# Patient Record
Sex: FEMALE | Race: WHITE | NOT HISPANIC OR LATINO | Employment: FULL TIME | URBAN - METROPOLITAN AREA
[De-identification: names, ages, dates, MRNs, and addresses within clinical notes are randomized per-mention and may not be internally consistent; named-entity substitution may affect disease eponyms.]

---

## 2017-01-05 ENCOUNTER — GENERIC CONVERSION - ENCOUNTER (OUTPATIENT)
Dept: OTHER | Facility: OTHER | Age: 27
End: 2017-01-05

## 2017-02-07 ENCOUNTER — HOSPITAL ENCOUNTER (EMERGENCY)
Facility: HOSPITAL | Age: 27
Discharge: HOME | End: 2017-02-07
Attending: EMERGENCY MEDICINE
Payer: COMMERCIAL

## 2017-02-07 ENCOUNTER — APPOINTMENT (EMERGENCY)
Dept: CT IMAGING | Facility: HOSPITAL | Age: 27
End: 2017-02-07
Payer: COMMERCIAL

## 2017-02-07 VITALS
HEART RATE: 71 BPM | DIASTOLIC BLOOD PRESSURE: 84 MMHG | TEMPERATURE: 98.3 F | RESPIRATION RATE: 18 BRPM | OXYGEN SATURATION: 97 % | SYSTOLIC BLOOD PRESSURE: 135 MMHG

## 2017-02-07 DIAGNOSIS — R10.9 ACUTE LEFT FLANK PAIN: Primary | ICD-10-CM

## 2017-02-07 LAB
ANION GAP SERPL CALCULATED.3IONS-SCNC: 9 MMOL/L (ref 4–13)
BASOPHILS # BLD AUTO: 0.02 THOUSANDS/ΜL (ref 0–0.1)
BASOPHILS NFR BLD AUTO: 0 % (ref 0–1)
BILIRUB UR QL STRIP: NEGATIVE
BUN SERPL-MCNC: 10 MG/DL (ref 5–25)
CALCIUM SERPL-MCNC: 8.7 MG/DL (ref 8.3–10.1)
CHLORIDE SERPL-SCNC: 104 MMOL/L (ref 100–108)
CLARITY UR: CLEAR
CO2 SERPL-SCNC: 27 MMOL/L (ref 21–32)
COLOR UR: YELLOW
CREAT SERPL-MCNC: 0.77 MG/DL (ref 0.6–1.3)
EOSINOPHIL # BLD AUTO: 0.14 THOUSAND/ΜL (ref 0–0.61)
EOSINOPHIL NFR BLD AUTO: 2 % (ref 0–6)
ERYTHROCYTE [DISTWIDTH] IN BLOOD BY AUTOMATED COUNT: 12.5 % (ref 11.6–15.1)
GFR SERPL CREATININE-BSD FRML MDRD: >60 ML/MIN/1.73SQ M
GLUCOSE SERPL-MCNC: 98 MG/DL (ref 65–140)
GLUCOSE UR STRIP-MCNC: NEGATIVE MG/DL
HCG UR QL: NORMAL
HCT VFR BLD AUTO: 45.4 % (ref 34.8–46.1)
HGB BLD-MCNC: 15.4 G/DL (ref 11.5–15.4)
HGB UR QL STRIP.AUTO: NEGATIVE
KETONES UR STRIP-MCNC: NEGATIVE MG/DL
LEUKOCYTE ESTERASE UR QL STRIP: NEGATIVE
LYMPHOCYTES # BLD AUTO: 2.01 THOUSANDS/ΜL (ref 0.6–4.47)
LYMPHOCYTES NFR BLD AUTO: 33 % (ref 14–44)
MCH RBC QN AUTO: 31.5 PG (ref 26.8–34.3)
MCHC RBC AUTO-ENTMCNC: 33.9 G/DL (ref 31.4–37.4)
MCV RBC AUTO: 93 FL (ref 82–98)
MONOCYTES # BLD AUTO: 0.67 THOUSAND/ΜL (ref 0.17–1.22)
MONOCYTES NFR BLD AUTO: 11 % (ref 4–12)
NEUTROPHILS # BLD AUTO: 3.2 THOUSANDS/ΜL (ref 1.85–7.62)
NEUTS SEG NFR BLD AUTO: 54 % (ref 43–75)
NITRITE UR QL STRIP: NEGATIVE
PH UR STRIP.AUTO: 6.5 [PH] (ref 4.5–8)
PLATELET # BLD AUTO: 155 THOUSANDS/UL (ref 149–390)
PMV BLD AUTO: 10.9 FL (ref 8.9–12.7)
POTASSIUM SERPL-SCNC: 3.5 MMOL/L (ref 3.5–5.3)
PROT UR STRIP-MCNC: NEGATIVE MG/DL
RBC # BLD AUTO: 4.89 MILLION/UL (ref 3.81–5.12)
SODIUM SERPL-SCNC: 140 MMOL/L (ref 136–145)
SP GR UR STRIP.AUTO: 1.02 (ref 1–1.03)
UROBILINOGEN UR QL STRIP.AUTO: 0.2 E.U./DL
WBC # BLD AUTO: 6.04 THOUSAND/UL (ref 4.31–10.16)

## 2017-02-07 PROCEDURE — 80048 BASIC METABOLIC PNL TOTAL CA: CPT | Performed by: EMERGENCY MEDICINE

## 2017-02-07 PROCEDURE — 81003 URINALYSIS AUTO W/O SCOPE: CPT | Performed by: EMERGENCY MEDICINE

## 2017-02-07 PROCEDURE — 85025 COMPLETE CBC W/AUTO DIFF WBC: CPT | Performed by: EMERGENCY MEDICINE

## 2017-02-07 PROCEDURE — 99284 EMERGENCY DEPT VISIT MOD MDM: CPT

## 2017-02-07 PROCEDURE — 81025 URINE PREGNANCY TEST: CPT | Performed by: EMERGENCY MEDICINE

## 2017-02-07 PROCEDURE — 36415 COLL VENOUS BLD VENIPUNCTURE: CPT | Performed by: EMERGENCY MEDICINE

## 2017-02-07 PROCEDURE — 96374 THER/PROPH/DIAG INJ IV PUSH: CPT

## 2017-02-07 PROCEDURE — 74176 CT ABD & PELVIS W/O CONTRAST: CPT

## 2017-02-07 PROCEDURE — 96375 TX/PRO/DX INJ NEW DRUG ADDON: CPT

## 2017-02-07 RX ORDER — HYDROCODONE BITARTRATE AND ACETAMINOPHEN 5; 325 MG/1; MG/1
1 TABLET ORAL EVERY 6 HOURS PRN
Qty: 15 TABLET | Refills: 0 | Status: SHIPPED | OUTPATIENT
Start: 2017-02-07 | End: 2017-02-17

## 2017-02-07 RX ORDER — MORPHINE SULFATE 4 MG/ML
4 INJECTION, SOLUTION INTRAMUSCULAR; INTRAVENOUS ONCE
Status: COMPLETED | OUTPATIENT
Start: 2017-02-07 | End: 2017-02-07

## 2017-02-07 RX ORDER — KETOROLAC TROMETHAMINE 30 MG/ML
15 INJECTION, SOLUTION INTRAMUSCULAR; INTRAVENOUS ONCE
Status: COMPLETED | OUTPATIENT
Start: 2017-02-07 | End: 2017-02-07

## 2017-02-07 RX ADMIN — MORPHINE SULFATE 4 MG: 4 INJECTION, SOLUTION INTRAMUSCULAR; INTRAVENOUS at 19:19

## 2017-02-07 RX ADMIN — KETOROLAC TROMETHAMINE 15 MG: 30 INJECTION, SOLUTION INTRAMUSCULAR at 19:17

## 2017-03-20 ENCOUNTER — APPOINTMENT (EMERGENCY)
Dept: RADIOLOGY | Facility: HOSPITAL | Age: 27
End: 2017-03-20
Payer: COMMERCIAL

## 2017-03-20 ENCOUNTER — HOSPITAL ENCOUNTER (EMERGENCY)
Facility: HOSPITAL | Age: 27
Discharge: HOME | End: 2017-03-20
Attending: EMERGENCY MEDICINE | Admitting: EMERGENCY MEDICINE
Payer: COMMERCIAL

## 2017-03-20 VITALS
RESPIRATION RATE: 18 BRPM | DIASTOLIC BLOOD PRESSURE: 101 MMHG | SYSTOLIC BLOOD PRESSURE: 192 MMHG | HEART RATE: 78 BPM | OXYGEN SATURATION: 100 % | TEMPERATURE: 98.4 F

## 2017-03-20 DIAGNOSIS — S33.5XXA LUMBAR SPRAIN, INITIAL ENCOUNTER: Primary | ICD-10-CM

## 2017-03-20 PROCEDURE — 96372 THER/PROPH/DIAG INJ SC/IM: CPT

## 2017-03-20 PROCEDURE — 72100 X-RAY EXAM L-S SPINE 2/3 VWS: CPT

## 2017-03-20 PROCEDURE — 99283 EMERGENCY DEPT VISIT LOW MDM: CPT

## 2017-03-20 RX ORDER — CYCLOBENZAPRINE HCL 10 MG
10 TABLET ORAL ONCE
Status: COMPLETED | OUTPATIENT
Start: 2017-03-20 | End: 2017-03-20

## 2017-03-20 RX ORDER — KETOROLAC TROMETHAMINE 30 MG/ML
30 INJECTION, SOLUTION INTRAMUSCULAR; INTRAVENOUS ONCE
Status: COMPLETED | OUTPATIENT
Start: 2017-03-20 | End: 2017-03-20

## 2017-03-20 RX ORDER — CYCLOBENZAPRINE HCL 5 MG
5 TABLET ORAL 3 TIMES DAILY PRN
Qty: 12 TABLET | Refills: 0 | Status: SHIPPED | OUTPATIENT
Start: 2017-03-20 | End: 2020-09-14 | Stop reason: ALTCHOICE

## 2017-03-20 RX ORDER — METHYLPREDNISOLONE 4 MG/1
TABLET ORAL
Qty: 21 TABLET | Refills: 0 | Status: ON HOLD | OUTPATIENT
Start: 2017-03-20 | End: 2019-03-12

## 2017-03-20 RX ADMIN — KETOROLAC TROMETHAMINE 30 MG: 30 INJECTION, SOLUTION INTRAMUSCULAR at 22:17

## 2017-03-20 RX ADMIN — CYCLOBENZAPRINE HYDROCHLORIDE 10 MG: 10 TABLET, FILM COATED ORAL at 22:17

## 2017-03-24 ENCOUNTER — GENERIC CONVERSION - ENCOUNTER (OUTPATIENT)
Dept: OTHER | Facility: OTHER | Age: 27
End: 2017-03-24

## 2017-06-20 ENCOUNTER — GENERIC CONVERSION - ENCOUNTER (OUTPATIENT)
Dept: OTHER | Facility: OTHER | Age: 27
End: 2017-06-20

## 2017-11-02 ENCOUNTER — APPOINTMENT (OUTPATIENT)
Dept: URGENT CARE | Age: 27
End: 2017-11-02
Payer: OTHER MISCELLANEOUS

## 2017-11-02 PROCEDURE — 99283 EMERGENCY DEPT VISIT LOW MDM: CPT | Performed by: PREVENTIVE MEDICINE

## 2017-11-02 PROCEDURE — G0382 LEV 3 HOSP TYPE B ED VISIT: HCPCS | Performed by: PREVENTIVE MEDICINE

## 2017-11-06 ENCOUNTER — APPOINTMENT (OUTPATIENT)
Dept: URGENT CARE | Age: 27
End: 2017-11-06
Payer: OTHER MISCELLANEOUS

## 2017-11-06 PROCEDURE — 99213 OFFICE O/P EST LOW 20 MIN: CPT | Performed by: PREVENTIVE MEDICINE

## 2018-01-10 NOTE — PROGRESS NOTES
Chief Complaint  pt is here for depo injection      Active Problems    1  Abdominal pain (789 00) (R10 9)   2  Acne (706 1) (L70 9)   3  Ankle Sprain (845 00)   4  Calcium oxalate crystals in urine (791 9) (R82 99)   5  Contact dermatitis due to plant (692 6) (L25 5)   6  Contraception (V25 9) (Z30 9)   7  Dressing change or removal, surgical wound (V58 31) (Z48 01)   8  Encounter for initial prescription of injectable contraceptive (V25 02) (Z30 013)   9  Encounter for routine gynecological examination with Papanicolaou smear of cervix   (V72 31,V76 2) (Z01 419)   10  Folliculitis (257 4) (O47 8)   11  History of kidney stones (V13 01) (Z87 442)   12  Irritable bowel syndrome (564 1) (K58 9)   13  Kidney stone (592 0) (N20 0)   14  Lymphadenitis (289 3) (I88 9)   15  Nicotine dependence (305 1) (F17 200)   16  Obesity (278 00) (E66 9)   17  Screening for STD (sexually transmitted disease) (V74 5) (Z11 3)    Current Meds   1  Theraflu Cold & Cough 10-20-20 MG Oral Packet; Therapy: [id number] to Recorded    Allergies    1  No Known Drug Allergies    2  No Known Environmental Allergies   3  No Known Food Allergies   4  Tape    Vitals  Signs    Systolic: 947, RUE, Sitting  Diastolic: 74, RUE, Sitting  Heart Rate: 86  Height: 5 ft 5 in  Weight: 219 lb   BMI Calculated: 36 44  BSA Calculated: 2 06    Plan  Encounter for Depo-Provera contraception    · MedroxyPROGESTERone Acetate 150 MG/ML Intramuscular Suspension;  INJECT 1  ML Intramuscular every 11 weeks; Recurring Schedule:[id number] to (Exp:[id number])   · MedroxyPROGESTERone Acetate 150 MG/ML Intramuscular Suspension    Future Appointments    Date/Time Provider Specialty Site   10/20/2016 03:00 PM [address] [place] [address], Injection Schedule  [facility]     Signatures   Electronically signed by : LUIS Stein;  Aug  4 2016  4:48PM EST                       (Author)    Electronically signed by : Giana Orr MD; Aug  6 2016  9:25AM EST (Administrative)

## 2018-01-16 NOTE — PROGRESS NOTES
Chief Complaint  pt here for depo      Active Problems    1  Abdominal pain (789 00) (R10 9)   2  Acne (706 1) (L70 9)   3  Ankle Sprain (845 00)   4  Calcium oxalate crystals in urine (791 9) (R82 99)   5  Contact dermatitis due to plant (692 6) (L25 5)   6  Dressing change or removal, surgical wound (V58 31) (Z48 01)   7  Encounter for initial prescription of injectable contraceptive (V25 02) (Z30 013)   8  Encounter for routine gynecological examination with Papanicolaou smear of cervix   (V72 31,V76 2) (Z01 419)   9  Folliculitis (643 0) (O59 8)   10  History of kidney stones (V13 01) (Z87 442)   11  Irritable bowel syndrome (564 1) (K58 9)   12  Kidney stone (592 0) (N20 0)   13  Lymphadenitis (289 3) (I88 9)   14  Nicotine dependence (305 1) (F17 200)   15  Obesity (278 00) (E66 9)   16  Screening for STD (sexually transmitted disease) (V74 5) (Z11 3)    Current Meds   1  Theraflu Cold & Cough 10-20-20 MG Oral Packet; Therapy: [id number] to Recorded    Allergies    1  No Known Drug Allergies    2  No Known Environmental Allergies   3  No Known Food Allergies   4   Tape    Vitals  Signs [Data Includes: Current Encounter]    Systolic: 329  Diastolic: 86  BP Cuff Size: Large  Height: 5 ft   Weight: 220 lb 6 4 oz  BMI Calculated: 43 04  BSA Calculated: 1 95  Pain Scale: 0   Height: 5 ft     Results/Data  Screen Ebola Flowsheet [id number] 03:19PM      Test Name Result Flag Reference   Exposure to Ebola Virus - Within 21 Days No         Future Appointments    Date/Time Provider Specialty Site   08/04/2016 03:00 PM [address] [facility], Injection Schedule  [facility]     Signatures   Electronically signed by : Jumana Mccarthy MD; May 19 2016  4:48PM EST                       (Administrative)

## 2018-01-22 VITALS
SYSTOLIC BLOOD PRESSURE: 134 MMHG | BODY MASS INDEX: 34.53 KG/M2 | HEART RATE: 84 BPM | WEIGHT: 207.5 LBS | DIASTOLIC BLOOD PRESSURE: 84 MMHG

## 2018-01-22 VITALS
WEIGHT: 217 LBS | DIASTOLIC BLOOD PRESSURE: 76 MMHG | BODY MASS INDEX: 36.11 KG/M2 | SYSTOLIC BLOOD PRESSURE: 121 MMHG | HEART RATE: 87 BPM

## 2018-01-22 VITALS
WEIGHT: 222 LBS | HEART RATE: 73 BPM | SYSTOLIC BLOOD PRESSURE: 129 MMHG | BODY MASS INDEX: 36.94 KG/M2 | DIASTOLIC BLOOD PRESSURE: 93 MMHG

## 2018-03-30 ENCOUNTER — APPOINTMENT (OUTPATIENT)
Dept: URGENT CARE | Age: 28
End: 2018-03-30
Payer: OTHER MISCELLANEOUS

## 2018-03-30 PROCEDURE — 99284 EMERGENCY DEPT VISIT MOD MDM: CPT | Performed by: PREVENTIVE MEDICINE

## 2018-03-30 PROCEDURE — G0383 LEV 4 HOSP TYPE B ED VISIT: HCPCS | Performed by: PREVENTIVE MEDICINE

## 2018-04-05 ENCOUNTER — APPOINTMENT (OUTPATIENT)
Dept: URGENT CARE | Age: 28
End: 2018-04-05
Payer: OTHER MISCELLANEOUS

## 2018-04-05 PROCEDURE — 99213 OFFICE O/P EST LOW 20 MIN: CPT | Performed by: PREVENTIVE MEDICINE

## 2018-04-12 ENCOUNTER — APPOINTMENT (OUTPATIENT)
Dept: URGENT CARE | Age: 28
End: 2018-04-12
Payer: OTHER MISCELLANEOUS

## 2018-04-12 PROCEDURE — 99213 OFFICE O/P EST LOW 20 MIN: CPT | Performed by: PREVENTIVE MEDICINE

## 2018-12-27 ENCOUNTER — OFFICE VISIT (OUTPATIENT)
Dept: PLASTIC SURGERY | Facility: MEDICAL CENTER | Age: 28
End: 2018-12-27
Payer: COMMERCIAL

## 2018-12-27 VITALS — BODY MASS INDEX: 33.66 KG/M2 | HEIGHT: 65 IN | WEIGHT: 202 LBS

## 2018-12-27 DIAGNOSIS — G89.29 CHRONIC BACK PAIN, UNSPECIFIED BACK LOCATION, UNSPECIFIED BACK PAIN LATERALITY: ICD-10-CM

## 2018-12-27 DIAGNOSIS — M54.9 CHRONIC BACK PAIN, UNSPECIFIED BACK LOCATION, UNSPECIFIED BACK PAIN LATERALITY: ICD-10-CM

## 2018-12-27 DIAGNOSIS — N62 MACROMASTIA: Primary | ICD-10-CM

## 2018-12-27 PROCEDURE — 99203 OFFICE O/P NEW LOW 30 MIN: CPT | Performed by: SURGERY

## 2018-12-27 NOTE — PROGRESS NOTES
Assessment/Plan:    Macromastia  44-year-old female with chronic upper and lower back pain for the last 2 years it started with intense muscle spasms who presents in consultation for bilateral breast reduction  She attributes the back pain to her large breasts  She states that she is had large breasts since the 7th grade  They are currently a 45 G  She would like to be a full D  she reports menarche at the age of 5 and breast development shortly thereafter  She has no family history or personal history of breast cancer  Her grandmother had cervical cancer  She has not yet had a mammogram   She has 1 child and she did not breast-feed the child  She feels as though she is fairly symmetric  She works in a warehouse  She has symptomatic macromastia  I have recommended bilateral breast reduction to alleviate her symptoms  I have discussed with her the risks, benefits and alternatives of the procedure including but not limited to risks of bleeding, infection, characteristic inverted T pattern scar, seroma and possibility of loss of nipple sensation  She understands these risks and wishes to proceed  I will write a letter of medical necessity and pending prior authorization we will schedule her for surgery  Diagnoses and all orders for this visit:    Macromastia    Chronic back pain, unspecified back location, unspecified back pain laterality          Subjective:      Patient ID: Luke Delacruz is a 29 y o  female  44-year-old female with chronic upper and lower back pain for the last 2 years it started with intense muscle spasms who presents in consultation for bilateral breast reduction  She attributes the back pain to her large breasts  She states that she is had large breasts since the 7th grade  They are currently a 45 G  She would like to be a full D  she reports menarche at the age of 5 and breast development shortly thereafter    She has no family history or personal history of breast cancer  Her grandmother had cervical cancer  She has not yet had a mammogram   She has 1 child and she did not breast-feed the child  She feels as though she is fairly symmetric  She works in a warehouse  The following portions of the patient's history were reviewed and updated as appropriate: allergies, current medications, past family history, past medical history, past social history, past surgical history and problem list     Review of Systems   Constitutional: Negative  HENT: Negative  Eyes: Negative  Respiratory: Negative  Cardiovascular: Negative  Gastrointestinal: Negative  Endocrine: Negative  Genitourinary: Negative  Musculoskeletal:        As noted in HPI   Skin: Negative  Allergic/Immunologic: Negative  Neurological: Negative  Hematological: Negative  Psychiatric/Behavioral: Negative  Objective:      Ht 5' 5" (1 651 m)   Wt 91 6 kg (202 lb)   BMI 33 61 kg/m²          Physical Exam   Constitutional: She is oriented to person, place, and time  She appears well-developed and well-nourished  HENT:   Head: Normocephalic and atraumatic  Eyes: Pupils are equal, round, and reactive to light  Conjunctivae are normal    Neck: Normal range of motion  Cardiovascular: Normal rate and regular rhythm  Pulmonary/Chest: Effort normal and breath sounds normal        Left breast slightly larger than right  Abdominal: Soft  Bowel sounds are normal    Musculoskeletal: Normal range of motion  Neurological: She is alert and oriented to person, place, and time  Skin: Skin is warm and dry  Psychiatric: She has a normal mood and affect   Her behavior is normal

## 2018-12-27 NOTE — ASSESSMENT & PLAN NOTE
30-year-old female with chronic upper and lower back pain for the last 2 years it started with intense muscle spasms who presents in consultation for bilateral breast reduction  She attributes the back pain to her large breasts  She states that she is had large breasts since the 7th grade  They are currently a 45 G  She would like to be a full D  she reports menarche at the age of 5 and breast development shortly thereafter  She has no family history or personal history of breast cancer  Her grandmother had cervical cancer  She has not yet had a mammogram   She has 1 child and she did not breast-feed the child  She feels as though she is fairly symmetric  She works in a warehouse  She has symptomatic macromastia  I have recommended bilateral breast reduction to alleviate her symptoms  I have discussed with her the risks, benefits and alternatives of the procedure including but not limited to risks of bleeding, infection, characteristic inverted T pattern scar, seroma and possibility of loss of nipple sensation  She understands these risks and wishes to proceed  I will write a letter of medical necessity and pending prior authorization we will schedule her for surgery

## 2019-01-21 PROBLEM — N62 LARGE BREASTS: Status: ACTIVE | Noted: 2019-01-21

## 2019-02-26 LAB
BASOPHILS # BLD AUTO: 32 CELLS/UL (ref 0–200)
BASOPHILS NFR BLD AUTO: 0.6 %
BUN SERPL-MCNC: 14 MG/DL (ref 7–25)
BUN/CREAT SERPL: NORMAL (CALC) (ref 6–22)
CALCIUM SERPL-MCNC: 9.2 MG/DL (ref 8.6–10.2)
CHLORIDE SERPL-SCNC: 104 MMOL/L (ref 98–110)
CO2 SERPL-SCNC: 27 MMOL/L (ref 20–32)
COTININE SERPL-MCNC: 94 NG/ML
CREAT SERPL-MCNC: 0.69 MG/DL (ref 0.5–1.1)
EOSINOPHIL # BLD AUTO: 180 CELLS/UL (ref 15–500)
EOSINOPHIL NFR BLD AUTO: 3.4 %
ERYTHROCYTE [DISTWIDTH] IN BLOOD BY AUTOMATED COUNT: 12.2 % (ref 11–15)
GLUCOSE SERPL-MCNC: 88 MG/DL (ref 65–99)
HCT VFR BLD AUTO: 43.5 % (ref 35–45)
HGB BLD-MCNC: 14.8 G/DL (ref 11.7–15.5)
LYMPHOCYTES # BLD AUTO: 1442 CELLS/UL (ref 850–3900)
LYMPHOCYTES NFR BLD AUTO: 27.2 %
MCH RBC QN AUTO: 32.2 PG (ref 27–33)
MCHC RBC AUTO-ENTMCNC: 34 G/DL (ref 32–36)
MCV RBC AUTO: 94.8 FL (ref 80–100)
MONOCYTES # BLD AUTO: 498 CELLS/UL (ref 200–950)
MONOCYTES NFR BLD AUTO: 9.4 %
NEUTROPHILS # BLD AUTO: 3148 CELLS/UL (ref 1500–7800)
NEUTROPHILS NFR BLD AUTO: 59.4 %
NICOTINE SERPL-MCNC: <2 NG/ML
PLATELET # BLD AUTO: 175 THOUSAND/UL (ref 140–400)
PMV BLD REES-ECKER: 11.7 FL (ref 7.5–12.5)
POTASSIUM SERPL-SCNC: 4 MMOL/L (ref 3.5–5.3)
RBC # BLD AUTO: 4.59 MILLION/UL (ref 3.8–5.1)
SL AMB EGFR AFRICAN AMERICAN: 137 ML/MIN/1.73M2
SL AMB EGFR NON AFRICAN AMERICAN: 118 ML/MIN/1.73M2
SODIUM SERPL-SCNC: 138 MMOL/L (ref 135–146)
WBC # BLD AUTO: 5.3 THOUSAND/UL (ref 3.8–10.8)

## 2019-03-04 NOTE — PRE-PROCEDURE INSTRUCTIONS
Pre-Surgery Instructions:   Medication Instructions    cyclobenzaprine (FLEXERIL) 5 mg tablet Instructed patient per Anesthesia Guidelines   methylprednisolone (MEDROL) 4 mg tablet Instructed patient per Anesthesia Guidelines      Pre op and showering instructions reviewed-Patient getting Hibiclens

## 2019-03-08 ENCOUNTER — OFFICE VISIT (OUTPATIENT)
Dept: PLASTIC SURGERY | Facility: CLINIC | Age: 29
End: 2019-03-08

## 2019-03-08 VITALS — BODY MASS INDEX: 34.82 KG/M2 | HEIGHT: 65 IN | WEIGHT: 209 LBS

## 2019-03-08 DIAGNOSIS — N62 MACROMASTIA: Primary | ICD-10-CM

## 2019-03-08 PROCEDURE — PREOP: Performed by: SURGERY

## 2019-03-11 ENCOUNTER — ANESTHESIA EVENT (OUTPATIENT)
Dept: PERIOP | Facility: HOSPITAL | Age: 29
End: 2019-03-11
Payer: COMMERCIAL

## 2019-03-11 NOTE — H&P
H&P - Plastic Surgery   Mimi Horan 29 y o  female MRN: [id number]  Unit/Bed#:  Encounter: [id number]           Assessment:  Large breasts [N62]  Chronic back pain, unspecified back location, unspecified back pain laterality      Plan:  REDUCTION MAMMOPLASTY BREAST (Bilateral Breast)        HPI:   Mimi Horan is a 29y o  year old female who presents with chronic upper and lower back pain for the last 2 years it started with intense muscle spasms who presents in consultation for bilateral breast reduction  She attributes the back pain to her large breasts  She states that she is had large breasts since the 7th grade  They are currently a 45 G  She would like to be a full D  she reports menarche at the age of 5 and breast development shortly thereafter  She has no family history or personal history of breast cancer  Her grandmother had cervical cancer  She has not yet had a mammogram   She has 1 child and she did not breast-feed the child  She feels as though she is fairly symmetric  She works in a warehouse  REVIEW OF SYSTEMS    GENERAL/CONSTITUTIONAL: The patient denies fever, fatigue, weakness, weight gain or weight loss  HEAD, EYES, EARS, NOSE AND THROAT: Eyes - The patient denies pain, redness, loss of vision, double or blurred vision and denies wearing glasses  The patient denies ringing in the ears, nosebleeds sinusitis, post nasal drip  Also denies frequent sore throats, hoarseness, painful swallowing  CARDIOVASCULAR: The patient denies chest pain, irregular heartbeats, palpitations, shortness of breath, heart murmurs, high blood pressure, cramps in his legs with walking, pain in his feet or toes at night or varicose veins  RESPIRATORY: The patient denies chronic cough, wheezing or night sweats  GASTROINTESTINAL: The patient denies decreased appetite, nausea, vomiting, diarrhea, constipation, blood in the stools    GENITOURINARY: The patient denies difficult urination, pain or burning with urination, blood in the urine  MUSCULOSKELETAL: The patient denies arm, thigh or calf cramps  No joint or muscle pain  No muscle weakness or tenderness  No joint swelling, neck pain, back pain or major orthopedic injuries  SKIN AND BREASTS: see hpi The patient denies easy bruising, skin redness, skin rash, hives  NEUROLOGIC: The patient denies headache, dizziness, fainting, memory loss  PSYCHIATRIC: The patient denies depression anxiety  ENDOCRINE: The patient denies intolerance to hot or cold temperature, flushing, fingernail changes, increased thirst, increased salt intake or decreased sexual desire  HEMATOLOGIC/LYMPHATIC: The patient denies anemia, bleeding tendency or clotting tendency  ALLERGIC/IMMUNOLOGIC: The patient denies rhinitis, asthma, skin sensitivity, latex allergies or sensitivity  Historical Information   Past Medical History:   Diagnosis Date    Kidney stone     Seasonal allergies      Past Surgical History:   Procedure Laterality Date    THROAT SURGERY      as a child to remove abcess    WISDOM TOOTH EXTRACTION      3/4     Social History   Social History     Substance and Sexual Activity   Alcohol Use Yes    Frequency: 2-4 times a month    Drinks per session: 1 or 2    Binge frequency: Never    Comment: socially     Social History     Substance and Sexual Activity   Drug Use No     Social History     Tobacco Use   Smoking Status Current Every Day Smoker    Packs/day: 0 25    Years: 11 00    Pack years: 2 75    Types: Cigarettes   Smokeless Tobacco Never Used     Family History: History reviewed  No pertinent family history      Meds/Allergies   all current active meds have been reviewed      Objective     Ht 5' 5" (1 651 m)   Wt 94 8 kg (209 lb)   BMI 34 78 kg/m²     General Appearance:    Alert, cooperative, no distress, appears stated age   Head:    Normocephalic, without obvious abnormality, atraumatic   Eyes:    PERRL, conjunctiva/corneas clear, EOM's intact, fundi     benign, both eyes   Ears:    Normal TM's and external ear canals, both ears   Nose:   Nares normal, septum midline, mucosa normal, no drainage    or sinus tenderness   Throat:   Lips, mucosa, and tongue normal; teeth and gums normal   Neck:   Supple, symmetrical, trachea midline, no adenopathy;     thyroid:  no enlargement/tenderness/nodules; no carotid    bruit or JVD   Back:     Symmetric, no curvature, ROM normal, no CVA tenderness   Lungs:     Clear to auscultation bilaterally, respirations unlabored   Chest Wall:    No tenderness or deformity    Heart:    Regular rate and rhythm, S1 and S2 normal, no murmur, rub   or gallop   Breast Exam:  Left breast slightly larger than right  Abdomen:     Soft, non-tender, bowel sounds active all four quadrants,     no masses, no organomegaly   Genitalia:    Normal female without lesion, discharge or tenderness   Rectal:    Normal tone, no masses or tenderness; guaiac negative stool   Extremities:   Extremities normal, atraumatic, no cyanosis or edema   Pulses:   2+ and symmetric all extremities   Skin:   Skin color, texture, turgor normal, no rashes or lesions   Lymph nodes:   Cervical, supraclavicular, and axillary nodes normal   Neurologic:   CNII-XII intact, normal strength, sensation and reflexes     throughout       Lab Results:   Lab Results   Component Value Date    WBC 5 3 02/21/2019    HGB 14 8 02/21/2019    HCT 43 5 02/21/2019    MCV 94 8 02/21/2019     02/21/2019          No results found for: TISSUECULT, WOUNDCULT      Imaging Studies:   No results found      EKG, Pathology, and Other Studies:   No results found for: FINALDX    No intake or output data in the 24 hours ending 03/11/19 0851    Invasive Devices          None          VTE Prophylaxis: Sequential compression device Ellen Askew)     Code Status: No Order  Advance Directive and Living Will:      Power of :

## 2019-03-12 ENCOUNTER — HOSPITAL ENCOUNTER (OUTPATIENT)
Facility: HOSPITAL | Age: 29
Setting detail: OUTPATIENT SURGERY
Discharge: HOME | End: 2019-03-12
Attending: SURGERY | Admitting: SURGERY
Payer: COMMERCIAL

## 2019-03-12 ENCOUNTER — ANESTHESIA (OUTPATIENT)
Dept: PERIOP | Facility: HOSPITAL | Age: 29
End: 2019-03-12
Payer: COMMERCIAL

## 2019-03-12 VITALS
SYSTOLIC BLOOD PRESSURE: 139 MMHG | HEART RATE: 70 BPM | OXYGEN SATURATION: 96 % | WEIGHT: 209 LBS | RESPIRATION RATE: 18 BRPM | TEMPERATURE: 98 F | BODY MASS INDEX: 34.82 KG/M2 | DIASTOLIC BLOOD PRESSURE: 76 MMHG | HEIGHT: 65 IN

## 2019-03-12 DIAGNOSIS — N62 MACROMASTIA: Primary | ICD-10-CM

## 2019-03-12 DIAGNOSIS — N62 LARGE BREASTS: ICD-10-CM

## 2019-03-12 LAB — EXT PREGNANCY TEST URINE: NEGATIVE

## 2019-03-12 PROCEDURE — 88305 TISSUE EXAM BY PATHOLOGIST: CPT | Performed by: PATHOLOGY

## 2019-03-12 PROCEDURE — 81025 URINE PREGNANCY TEST: CPT | Performed by: STUDENT IN AN ORGANIZED HEALTH CARE EDUCATION/TRAINING PROGRAM

## 2019-03-12 RX ORDER — NEOSTIGMINE METHYLSULFATE 1 MG/ML
INJECTION INTRAVENOUS AS NEEDED
Status: DISCONTINUED | OUTPATIENT
Start: 2019-03-12 | End: 2019-03-12 | Stop reason: SURG

## 2019-03-12 RX ORDER — HYDROMORPHONE HCL/PF 1 MG/ML
SYRINGE (ML) INJECTION AS NEEDED
Status: DISCONTINUED | OUTPATIENT
Start: 2019-03-12 | End: 2019-03-12 | Stop reason: SURG

## 2019-03-12 RX ORDER — SENNOSIDES 8.6 MG
650 CAPSULE ORAL EVERY 8 HOURS PRN
Qty: 30 TABLET | Refills: 0 | Status: SHIPPED | OUTPATIENT
Start: 2019-03-12 | End: 2020-07-06

## 2019-03-12 RX ORDER — SODIUM CHLORIDE 9 MG/ML
INJECTION, SOLUTION INTRAVENOUS CONTINUOUS PRN
Status: DISCONTINUED | OUTPATIENT
Start: 2019-03-12 | End: 2019-03-12 | Stop reason: SURG

## 2019-03-12 RX ORDER — GABAPENTIN 100 MG/1
300 CAPSULE ORAL 3 TIMES DAILY
Qty: 63 CAPSULE | Refills: 0 | Status: SHIPPED | OUTPATIENT
Start: 2019-03-12 | End: 2019-03-14 | Stop reason: SDUPTHER

## 2019-03-12 RX ORDER — OXYCODONE HYDROCHLORIDE 5 MG/1
5 TABLET ORAL EVERY 6 HOURS PRN
Qty: 28 TABLET | Refills: 0 | Status: SHIPPED | OUTPATIENT
Start: 2019-03-12 | End: 2019-03-19

## 2019-03-12 RX ORDER — LIDOCAINE HYDROCHLORIDE 10 MG/ML
INJECTION, SOLUTION INFILTRATION; PERINEURAL AS NEEDED
Status: DISCONTINUED | OUTPATIENT
Start: 2019-03-12 | End: 2019-03-12 | Stop reason: SURG

## 2019-03-12 RX ORDER — GLYCOPYRROLATE 0.2 MG/ML
INJECTION INTRAMUSCULAR; INTRAVENOUS AS NEEDED
Status: DISCONTINUED | OUTPATIENT
Start: 2019-03-12 | End: 2019-03-12 | Stop reason: SURG

## 2019-03-12 RX ORDER — ACETAMINOPHEN 325 MG/1
975 TABLET ORAL ONCE
Status: COMPLETED | OUTPATIENT
Start: 2019-03-12 | End: 2019-03-12

## 2019-03-12 RX ORDER — ONDANSETRON 2 MG/ML
INJECTION INTRAMUSCULAR; INTRAVENOUS AS NEEDED
Status: DISCONTINUED | OUTPATIENT
Start: 2019-03-12 | End: 2019-03-12 | Stop reason: SURG

## 2019-03-12 RX ORDER — NAPROXEN 500 MG/1
500 TABLET ORAL 2 TIMES DAILY WITH MEALS
Qty: 30 TABLET | Refills: 0 | Status: SHIPPED | OUTPATIENT
Start: 2019-03-12 | End: 2020-07-06

## 2019-03-12 RX ORDER — OXYCODONE HYDROCHLORIDE 5 MG/1
5 TABLET ORAL EVERY 4 HOURS PRN
Status: DISCONTINUED | OUTPATIENT
Start: 2019-03-12 | End: 2019-03-12 | Stop reason: HOSPADM

## 2019-03-12 RX ORDER — SODIUM CHLORIDE, SODIUM LACTATE, POTASSIUM CHLORIDE, CALCIUM CHLORIDE 600; 310; 30; 20 MG/100ML; MG/100ML; MG/100ML; MG/100ML
50 INJECTION, SOLUTION INTRAVENOUS CONTINUOUS
Status: DISCONTINUED | OUTPATIENT
Start: 2019-03-12 | End: 2019-03-12 | Stop reason: HOSPADM

## 2019-03-12 RX ORDER — GABAPENTIN 300 MG/1
300 CAPSULE ORAL ONCE
Status: COMPLETED | OUTPATIENT
Start: 2019-03-12 | End: 2019-03-12

## 2019-03-12 RX ORDER — HYDROMORPHONE HCL/PF 1 MG/ML
0.5 SYRINGE (ML) INJECTION
Status: DISCONTINUED | OUTPATIENT
Start: 2019-03-12 | End: 2019-03-12 | Stop reason: HOSPADM

## 2019-03-12 RX ORDER — TRAMADOL HYDROCHLORIDE 50 MG/1
50 TABLET ORAL EVERY 6 HOURS PRN
Qty: 27 TABLET | Refills: 0 | Status: SHIPPED | OUTPATIENT
Start: 2019-03-12 | End: 2019-03-22

## 2019-03-12 RX ORDER — BUPIVACAINE HYDROCHLORIDE 2.5 MG/ML
INJECTION, SOLUTION INFILTRATION; PERINEURAL AS NEEDED
Status: DISCONTINUED | OUTPATIENT
Start: 2019-03-12 | End: 2019-03-12 | Stop reason: HOSPADM

## 2019-03-12 RX ORDER — SENNOSIDES 8.6 MG
650 CAPSULE ORAL EVERY 8 HOURS PRN
Qty: 30 TABLET | Refills: 0 | Status: SHIPPED | OUTPATIENT
Start: 2019-03-12 | End: 2019-03-12 | Stop reason: SDUPTHER

## 2019-03-12 RX ORDER — PROMETHAZINE HYDROCHLORIDE 25 MG/ML
25 INJECTION, SOLUTION INTRAMUSCULAR; INTRAVENOUS ONCE AS NEEDED
Status: DISCONTINUED | OUTPATIENT
Start: 2019-03-12 | End: 2019-03-12 | Stop reason: HOSPADM

## 2019-03-12 RX ORDER — FENTANYL CITRATE/PF 50 MCG/ML
25 SYRINGE (ML) INJECTION
Status: COMPLETED | OUTPATIENT
Start: 2019-03-12 | End: 2019-03-12

## 2019-03-12 RX ORDER — CEFAZOLIN SODIUM 2 G/50ML
2000 SOLUTION INTRAVENOUS ONCE
Status: COMPLETED | OUTPATIENT
Start: 2019-03-12 | End: 2019-03-12

## 2019-03-12 RX ORDER — MIDAZOLAM HYDROCHLORIDE 1 MG/ML
INJECTION INTRAMUSCULAR; INTRAVENOUS AS NEEDED
Status: DISCONTINUED | OUTPATIENT
Start: 2019-03-12 | End: 2019-03-12 | Stop reason: SURG

## 2019-03-12 RX ORDER — ONDANSETRON 2 MG/ML
4 INJECTION INTRAMUSCULAR; INTRAVENOUS ONCE AS NEEDED
Status: DISCONTINUED | OUTPATIENT
Start: 2019-03-12 | End: 2019-03-12 | Stop reason: HOSPADM

## 2019-03-12 RX ORDER — TRAMADOL HYDROCHLORIDE 50 MG/1
50 TABLET ORAL EVERY 6 HOURS PRN
Status: DISCONTINUED | OUTPATIENT
Start: 2019-03-12 | End: 2019-03-12

## 2019-03-12 RX ORDER — MAGNESIUM HYDROXIDE 1200 MG/15ML
LIQUID ORAL AS NEEDED
Status: DISCONTINUED | OUTPATIENT
Start: 2019-03-12 | End: 2019-03-12 | Stop reason: HOSPADM

## 2019-03-12 RX ORDER — NAPROXEN 500 MG/1
500 TABLET ORAL 2 TIMES DAILY WITH MEALS
Qty: 30 TABLET | Refills: 0 | Status: SHIPPED | OUTPATIENT
Start: 2019-03-12 | End: 2019-03-12 | Stop reason: SDUPTHER

## 2019-03-12 RX ORDER — ROCURONIUM BROMIDE 10 MG/ML
INJECTION, SOLUTION INTRAVENOUS AS NEEDED
Status: DISCONTINUED | OUTPATIENT
Start: 2019-03-12 | End: 2019-03-12 | Stop reason: SURG

## 2019-03-12 RX ORDER — CEFAZOLIN SODIUM 1 G/3ML
INJECTION, POWDER, FOR SOLUTION INTRAMUSCULAR; INTRAVENOUS AS NEEDED
Status: DISCONTINUED | OUTPATIENT
Start: 2019-03-12 | End: 2019-03-12 | Stop reason: SURG

## 2019-03-12 RX ORDER — FENTANYL CITRATE 50 UG/ML
INJECTION, SOLUTION INTRAMUSCULAR; INTRAVENOUS AS NEEDED
Status: DISCONTINUED | OUTPATIENT
Start: 2019-03-12 | End: 2019-03-12 | Stop reason: SURG

## 2019-03-12 RX ORDER — TRAMADOL HYDROCHLORIDE 50 MG/1
50 TABLET ORAL EVERY 4 HOURS PRN
Status: DISCONTINUED | OUTPATIENT
Start: 2019-03-12 | End: 2019-03-12

## 2019-03-12 RX ORDER — DEXAMETHASONE SODIUM PHOSPHATE 10 MG/ML
INJECTION, SOLUTION INTRAMUSCULAR; INTRAVENOUS AS NEEDED
Status: DISCONTINUED | OUTPATIENT
Start: 2019-03-12 | End: 2019-03-12 | Stop reason: SURG

## 2019-03-12 RX ORDER — PROPOFOL 10 MG/ML
INJECTION, EMULSION INTRAVENOUS AS NEEDED
Status: DISCONTINUED | OUTPATIENT
Start: 2019-03-12 | End: 2019-03-12 | Stop reason: SURG

## 2019-03-12 RX ADMIN — FENTANYL CITRATE 25 MCG: 50 INJECTION, SOLUTION INTRAMUSCULAR; INTRAVENOUS at 12:53

## 2019-03-12 RX ADMIN — FENTANYL CITRATE 25 MCG: 50 INJECTION, SOLUTION INTRAMUSCULAR; INTRAVENOUS at 13:24

## 2019-03-12 RX ADMIN — OXYCODONE HYDROCHLORIDE 5 MG: 5 TABLET ORAL at 14:16

## 2019-03-12 RX ADMIN — FENTANYL CITRATE 50 MCG: 50 INJECTION INTRAMUSCULAR; INTRAVENOUS at 08:46

## 2019-03-12 RX ADMIN — ROCURONIUM BROMIDE 20 MG: 10 INJECTION INTRAVENOUS at 09:12

## 2019-03-12 RX ADMIN — ONDANSETRON 4 MG: 2 INJECTION INTRAMUSCULAR; INTRAVENOUS at 08:20

## 2019-03-12 RX ADMIN — ROCURONIUM BROMIDE 50 MG: 10 INJECTION INTRAVENOUS at 08:21

## 2019-03-12 RX ADMIN — FENTANYL CITRATE 25 MCG: 50 INJECTION, SOLUTION INTRAMUSCULAR; INTRAVENOUS at 12:58

## 2019-03-12 RX ADMIN — PROPOFOL 200 MG: 10 INJECTION, EMULSION INTRAVENOUS at 08:20

## 2019-03-12 RX ADMIN — ROCURONIUM BROMIDE 20 MG: 10 INJECTION INTRAVENOUS at 10:24

## 2019-03-12 RX ADMIN — FENTANYL CITRATE 50 MCG: 50 INJECTION INTRAMUSCULAR; INTRAVENOUS at 08:20

## 2019-03-12 RX ADMIN — SODIUM CHLORIDE: 0.9 INJECTION, SOLUTION INTRAVENOUS at 07:20

## 2019-03-12 RX ADMIN — ACETAMINOPHEN 975 MG: 325 TABLET, FILM COATED ORAL at 07:25

## 2019-03-12 RX ADMIN — CEFAZOLIN SODIUM 2000 MG: 1 INJECTION, POWDER, FOR SOLUTION INTRAMUSCULAR; INTRAVENOUS at 12:07

## 2019-03-12 RX ADMIN — GLYCOPYRROLATE 0.8 MG: 0.2 INJECTION, SOLUTION INTRAMUSCULAR; INTRAVENOUS at 12:33

## 2019-03-12 RX ADMIN — CEFAZOLIN SODIUM 2000 MG: 2 SOLUTION INTRAVENOUS at 08:13

## 2019-03-12 RX ADMIN — HYDROMORPHONE HYDROCHLORIDE 0.5 MG: 1 INJECTION, SOLUTION INTRAMUSCULAR; INTRAVENOUS; SUBCUTANEOUS at 08:54

## 2019-03-12 RX ADMIN — LIDOCAINE HYDROCHLORIDE ANHYDROUS 50 MG: 10 INJECTION, SOLUTION INFILTRATION at 08:20

## 2019-03-12 RX ADMIN — SODIUM CHLORIDE: 0.9 INJECTION, SOLUTION INTRAVENOUS at 11:31

## 2019-03-12 RX ADMIN — MIDAZOLAM HYDROCHLORIDE 2 MG: 1 INJECTION, SOLUTION INTRAMUSCULAR; INTRAVENOUS at 08:08

## 2019-03-12 RX ADMIN — FENTANYL CITRATE 25 MCG: 50 INJECTION, SOLUTION INTRAMUSCULAR; INTRAVENOUS at 13:05

## 2019-03-12 RX ADMIN — NEOSTIGMINE METHYLSULFATE 4 MG: 1 INJECTION INTRAVENOUS at 12:33

## 2019-03-12 RX ADMIN — HYDROMORPHONE HYDROCHLORIDE 0.5 MG: 1 INJECTION, SOLUTION INTRAMUSCULAR; INTRAVENOUS; SUBCUTANEOUS at 10:23

## 2019-03-12 RX ADMIN — DEXAMETHASONE SODIUM PHOSPHATE 8 MG: 10 INJECTION, SOLUTION INTRAMUSCULAR; INTRAVENOUS at 08:20

## 2019-03-12 RX ADMIN — GABAPENTIN 300 MG: 300 CAPSULE ORAL at 07:25

## 2019-03-12 NOTE — ANESTHESIA POSTPROCEDURE EVALUATION
Post-Op Assessment Note    CV Status:  Stable  Pain Score: 2    Pain management: adequate     Mental Status:  Sleepy   Hydration Status:  Stable and euvolemic   Airway Patency:  Patent   Post Op Vitals Reviewed: Yes      Staff: CRNA   Comments: vss          BP (P) 141/85 (03/12/19 1245)    Temp (P) 98 1 °F (36 7 °C) (03/12/19 1245)    Pulse (P) 91 (03/12/19 1245)   Resp (P) 18 (03/12/19 1245)    SpO2 (P) 100 % (03/12/19 1245)

## 2019-03-12 NOTE — OP NOTE
OPERATIVE REPORT  PATIENT NAME: Mel Torres    :  1990  MRN: [id number]  Pt Location: AN OR ROOM 03    SURGERY DATE: 3/12/2019    Surgeon(s) and Role:     * Terri Hirsch MD - Primary     * Rika Myers PA-C - Assisting    Preop Diagnosis:  Large breasts [N62]    Post-Op Diagnosis Codes:     * Large breasts [N62]    Procedure(s) (LRB):  REDUCTION MAMMOPLASTY BREAST (Bilateral)    Specimen(s):  ID Type Source Tests Collected by Time Destination   1 :  Tissue Breast, Left TISSUE EXAM Terri Hirsch MD 3/12/2019 0858    2 :  Tissue Breast, Right TISSUE EXAM Terri Hirsch MD 3/12/2019 0858        Estimated Blood Loss:   200 mL    Drains:  Urethral Catheter Latex 16 Fr  (Active)   Number of days: 0       Anesthesia Type:   General    Operative Indications:  Large breasts [N62]      Operative Findings:  Right breast 702 3 g, left breast 464 3 g    Complications:   None    Procedure and Technique:    69-year-old female with bilateral symptomatic macromastia  She presents for bilateral reduction mammoplasty  Risks, benefits and alternatives of procedure were explained to the patient understood and wished to proceed  Pertinent risks that were discussed included bleeding, infection, scar in the characteristic pattern of an upside down T, loss of nipple sensation, loss of nipple and seroma  The patient was identified in the sites for marked in preop holding  Patient was taken to the operating room where she was placed in supine position  After successful induction of general anesthesia with endotracheal intubation the patient was prepped with ChloraPrep and draped in standard sterile fashion  A timeout was performed  The patient, site and procedure were verified  80 mL of tumescent solution containing clindamycin, epinephrine and saline was injected along the patient's previously drawn markings  Using a 45 mm cookie cutter the right nipple incision was outlined   With a #15 blade scalpel the nipple incision was incised  A 10 cm wide pedicle based inferiorly was incised with a #10 blade scalpel  The inferior pedicle was de-epithelialized  Hemostasis was achieved with electrocautery  The inferior pedicle was then dissected first medially with electrocautery down to chest wall  The inferior pedicle was then dissected laterally down to chest wall electrocautery  The previously placed markings were then incised with a #10 blade scalpel  Electrocautery dissection was then carried out removing first the medial segment in continuity with the central segment and finally the lateral segment  Hemostasis was achieved with electrocautery with MAP greater than 70 mmHg  Medial and lateral limbs of the breast flaps were then sutured together and down to the breast median with a buried interrupted 2-0 Vicryl suture  The breast was then tailor tacked  Using a 38 mm cookie cutter the skin was then cut out a distance of 6 cm from the inframammary fold incision  The nipple was tailor tacked in place  The vertical incision was then closed with buried interrupted 3-0 Monocryl suture  The inframammary fold incision was then closed with in Sorb deep dermal staples followed by a running 3-0 strata fix suture  The nipple was inset with buried interrupted 4-0 Monocryl suture  A total of 702 3 g was removed from the right breast      Using a 45 mm cookie cutter the left nipple incision was outlined  With a #15 blade scalpel the nipple incision was incised  A 10 cm wide pedicle based inferiorly was incised with a #10 blade scalpel  The inferior pedicle was de-epithelialized  Hemostasis was achieved with electrocautery  The inferior pedicle was then dissected first medially with electrocautery down to chest wall  The inferior pedicle was then dissected laterally down to chest wall electrocautery  The previously placed markings were then incised with a #10 blade scalpel   Electrocautery dissection was then carried out removing first the medial segment in continuity with the central segment and finally the lateral segment  Hemostasis was achieved with electrocautery with MAP greater than 70 mmHg  Medial and lateral limbs of the breast flaps were then sutured together and down to the breast median with a buried interrupted 2-0 Vicryl suture  The breast was then tailor tacked  Using a 38 mm cookie cutter the skin was then cut out a distance of 6 cm from the inframammary fold incision  The nipple was tailor tacked in place  The vertical incision was then closed with buried interrupted 3-0 Monocryl suture  The inframammary fold incision was then closed with in Sorb deep dermal staples followed by a running 3-0 strata fix suture  The nipple was inset with buried interrupted 4-0 Monocryl suture  A total of this 693 g was removed from the left breast       The periareolar incisions were dressed with Histoacryl skin glue  The inframammary fold and vertical incisions were dressed with a Prineo dressing  The bilateral breasts were dressed with ABD dressings and a surgical bra  Patient tolerated the procedure well, emerged from general anesthesia, was extubated and taken to the PACU in stable condition  I was present for the entire procedure and A qualified resident physician was not available  The PA's assistance, John Shoemaker, was necessary for retraction and wound closure      Patient Disposition:  PACU     SIGNATURE: Licha Arndt MD  DATE: March 12, 2019  TIME: 12:27 PM

## 2019-03-12 NOTE — DISCHARGE INSTRUCTIONS
Breast reduction discharge instructions  Keep Surgical Bra and dressings clean dry and intact until your follow-up appointment on Friday  For pain control, please take Tylenol 650 mg every 6 hours on a schedule  After 48 hours you may also take naproxen or Advil  I have also written a prescription for Oxycodone 5 mg every 6 hours as needed for pain  I recommend you not wait until your pain is 10 out of 10  Stay ahead of it  You may resume your previous medications  Please call my office if you experience increasing pain, increasing redness, increased swelling, drainage through the dressing or fever greater than 100 5°F     My office number is [phone]  Follow up in our office in 4 days  CALL for appointment

## 2019-03-12 NOTE — ANESTHESIA PREPROCEDURE EVALUATION
Review of Systems/Medical History  Patient summary reviewed  Chart reviewed  No history of anesthetic complications     Cardiovascular  Negative cardio ROS Exercise tolerance (METS): >4,     Pulmonary  Smoker cigarette smoker  , Tobacco cessation counseling given ,        GI/Hepatic  Negative GI/hepatic ROS          Kidney stones (history),        Endo/Other    Obesity    GYN  Negative gynecology ROS Not currently pregnant ,          Hematology  Negative hematology ROS      Musculoskeletal  Negative musculoskeletal ROS        Neurology  Negative neurology ROS      Psychology   Negative psychology ROS              Physical Exam    Airway    Mallampati score: II         Dental   No notable dental hx     Cardiovascular  Comment: Negative ROS, Cardiovascular exam normal    Pulmonary  Pulmonary exam normal     Other Findings      Lab Results   Component Value Date    WBC 5 3 02/21/2019    HGB 14 8 02/21/2019    HCT 43 5 02/21/2019    MCV 94 8 02/21/2019     02/21/2019     Lab Results   Component Value Date    SODIUM 138 02/21/2019    K 4 0 02/21/2019    CO2 27 02/21/2019     02/21/2019    BUN 14 02/21/2019    CREATININE 0 77 02/07/2017     Anesthesia Plan  ASA Score- 2     Anesthesia Type- general with ASA Monitors  Additional Monitors:   Airway Plan: LMA  Plan Factors-    Induction- intravenous  Postoperative Plan- Plan for postoperative opioid use  Informed Consent- Anesthetic plan and risks discussed with patient  I personally reviewed this patient with the CRNA  Discussed and agreed on the Anesthesia Plan with the CRNA  Nancy Avila

## 2019-03-14 DIAGNOSIS — N62 LARGE BREASTS: ICD-10-CM

## 2019-03-14 RX ORDER — GABAPENTIN 100 MG/1
100 CAPSULE ORAL 3 TIMES DAILY
Qty: 21 CAPSULE | Refills: 0 | Status: ON HOLD | OUTPATIENT
Start: 2019-03-14 | End: 2019-03-22

## 2019-03-14 NOTE — TELEPHONE ENCOUNTER
Patient called stating that she did not receive her prescription of gabapentin  And would like one sent to [facility] on file  When calling the [address] [address], they informed me that the script was filled and picked up  Called patient back and she stated she did not receive it  I S/W Dr Rhona Chavez and informed him, he stated to place Gabapentin script to her [facility] pharmacy  Called patient and informed her  Sent script to Devi Ellis to sign

## 2019-03-15 ENCOUNTER — OFFICE VISIT (OUTPATIENT)
Dept: PLASTIC SURGERY | Facility: CLINIC | Age: 29
End: 2019-03-15

## 2019-03-15 DIAGNOSIS — N62 LARGE BREASTS: Primary | ICD-10-CM

## 2019-03-15 PROCEDURE — 99024 POSTOP FOLLOW-UP VISIT: CPT | Performed by: PHYSICIAN ASSISTANT

## 2019-03-15 NOTE — H&P (VIEW-ONLY)
03/12/19 0810         Procedure: REDUCTION MAMMOPLASTY BREAST (Bilateral Breast     Pt has ecchymosis bilaterally, right greater than left  Incisions are intact  The right T point has some serosanguinous drainage  Breasts are soft, without indurations  She has sensation of the left NAC but less on the right  Prineo is in place  She is ok to shower  She will continue to wear her bra, and return in 1 week

## 2019-03-18 ENCOUNTER — TELEPHONE (OUTPATIENT)
Dept: PLASTIC SURGERY | Facility: CLINIC | Age: 29
End: 2019-03-18

## 2019-03-18 NOTE — TELEPHONE ENCOUNTER
Called patient to follow up per Hoda Elizabeth PA-C, patient stated she is doing well, stated that bruising and swelling is getting better  Stated that she had a yellowish color starting where the bruising was  Stated minimal discharge  Patient questioned what weight restrictions she shoud have, informed her that she may not lift over 10 lbs for 4 weeks after surgery  Encouraged patient to call with any questions or concerns

## 2019-03-19 ENCOUNTER — OFFICE VISIT (OUTPATIENT)
Dept: PLASTIC SURGERY | Facility: CLINIC | Age: 29
End: 2019-03-19

## 2019-03-19 DIAGNOSIS — N62 LARGE BREASTS: ICD-10-CM

## 2019-03-19 DIAGNOSIS — N62 MACROMASTIA: Primary | ICD-10-CM

## 2019-03-19 PROCEDURE — 99024 POSTOP FOLLOW-UP VISIT: CPT | Performed by: PHYSICIAN ASSISTANT

## 2019-03-19 NOTE — PROGRESS NOTES
03/12/19 0810               Procedure: REDUCTION MAMMOPLASTY BREAST (Bilateral Breast       Patient called this morning with concerns of sanguinous drainage from her right breast, despite the improvement in the appearance of the ecchymosis  On exam her right breast is larger than the left, there is continued significant, but improving ecchymosis greater on the right than the left  Breasts are both soft without induration  Incisions are intact  Imeldao was in place  I recommended that the patient be seen by interventional radiology for ultrasound and aspiration if there is a large enough collection  The patient's pain level is improving, and she has only been using narcotics at bedtime  She has a follow-up appointment this Friday

## 2019-03-22 ENCOUNTER — HOSPITAL ENCOUNTER (OUTPATIENT)
Dept: RADIOLOGY | Facility: HOSPITAL | Age: 29
Discharge: HOME | End: 2019-03-22
Attending: RADIOLOGY | Admitting: RADIOLOGY
Payer: COMMERCIAL

## 2019-03-22 VITALS
OXYGEN SATURATION: 100 % | SYSTOLIC BLOOD PRESSURE: 120 MMHG | HEIGHT: 65 IN | HEART RATE: 65 BPM | TEMPERATURE: 98.6 F | BODY MASS INDEX: 34.16 KG/M2 | DIASTOLIC BLOOD PRESSURE: 67 MMHG | RESPIRATION RATE: 16 BRPM | WEIGHT: 205 LBS

## 2019-03-22 DIAGNOSIS — N62 MACROMASTIA: ICD-10-CM

## 2019-03-22 LAB
ERYTHROCYTE [DISTWIDTH] IN BLOOD BY AUTOMATED COUNT: 13.1 % (ref 11.6–15.1)
HCT VFR BLD AUTO: 38.3 % (ref 34.8–46.1)
HGB BLD-MCNC: 12.8 G/DL (ref 11.5–15.4)
INR PPP: 1.03 (ref 0.86–1.17)
MCH RBC QN AUTO: 32.8 PG (ref 26.8–34.3)
MCHC RBC AUTO-ENTMCNC: 33.4 G/DL (ref 31.4–37.4)
MCV RBC AUTO: 98 FL (ref 82–98)
PLATELET # BLD AUTO: 174 THOUSANDS/UL (ref 149–390)
PMV BLD AUTO: 10.6 FL (ref 8.9–12.7)
PROTHROMBIN TIME: 13.6 SECONDS (ref 11.8–14.2)
RBC # BLD AUTO: 3.9 MILLION/UL (ref 3.81–5.12)
WBC # BLD AUTO: 6.64 THOUSAND/UL (ref 4.31–10.16)

## 2019-03-22 PROCEDURE — 87205 SMEAR GRAM STAIN: CPT | Performed by: SURGERY

## 2019-03-22 PROCEDURE — 85027 COMPLETE CBC AUTOMATED: CPT | Performed by: RADIOLOGY

## 2019-03-22 PROCEDURE — 10030 IMG GID FLU COLL DRG SFT TIS: CPT

## 2019-03-22 PROCEDURE — 85610 PROTHROMBIN TIME: CPT | Performed by: RADIOLOGY

## 2019-03-22 PROCEDURE — 99152 MOD SED SAME PHYS/QHP 5/>YRS: CPT | Performed by: RADIOLOGY

## 2019-03-22 PROCEDURE — 87186 SC STD MICRODIL/AGAR DIL: CPT | Performed by: SURGERY

## 2019-03-22 PROCEDURE — 87077 CULTURE AEROBIC IDENTIFY: CPT | Performed by: SURGERY

## 2019-03-22 PROCEDURE — 87070 CULTURE OTHR SPECIMN AEROBIC: CPT | Performed by: SURGERY

## 2019-03-22 PROCEDURE — 10030 IMG GID FLU COLL DRG SFT TIS: CPT | Performed by: RADIOLOGY

## 2019-03-22 RX ORDER — HYDROCODONE BITARTRATE AND ACETAMINOPHEN 5; 325 MG/1; MG/1
1 TABLET ORAL EVERY 6 HOURS PRN
Status: DISCONTINUED | OUTPATIENT
Start: 2019-03-22 | End: 2019-03-22 | Stop reason: HOSPADM

## 2019-03-22 RX ORDER — MIDAZOLAM HYDROCHLORIDE 1 MG/ML
INJECTION INTRAMUSCULAR; INTRAVENOUS CODE/TRAUMA/SEDATION MEDICATION
Status: COMPLETED | OUTPATIENT
Start: 2019-03-22 | End: 2019-03-22

## 2019-03-22 RX ORDER — FENTANYL CITRATE 50 UG/ML
INJECTION, SOLUTION INTRAMUSCULAR; INTRAVENOUS CODE/TRAUMA/SEDATION MEDICATION
Status: COMPLETED | OUTPATIENT
Start: 2019-03-22 | End: 2019-03-22

## 2019-03-22 RX ORDER — SODIUM CHLORIDE 9 MG/ML
75 INJECTION, SOLUTION INTRAVENOUS CONTINUOUS
Status: DISCONTINUED | OUTPATIENT
Start: 2019-03-22 | End: 2019-03-22 | Stop reason: HOSPADM

## 2019-03-22 RX ADMIN — SODIUM CHLORIDE 75 ML/HR: 0.9 INJECTION, SOLUTION INTRAVENOUS at 10:40

## 2019-03-22 RX ADMIN — FENTANYL CITRATE 50 MCG: 50 INJECTION, SOLUTION INTRAMUSCULAR; INTRAVENOUS at 12:37

## 2019-03-22 RX ADMIN — FENTANYL CITRATE 50 MCG: 50 INJECTION, SOLUTION INTRAMUSCULAR; INTRAVENOUS at 12:32

## 2019-03-22 RX ADMIN — MIDAZOLAM 1 MG: 1 INJECTION INTRAMUSCULAR; INTRAVENOUS at 12:32

## 2019-03-22 RX ADMIN — MIDAZOLAM 1 MG: 1 INJECTION INTRAMUSCULAR; INTRAVENOUS at 12:37

## 2019-03-22 NOTE — BRIEF OP NOTE (RAD/CATH)
IR IMAGE GUIDED ASPIRATION / DRAINAGE  Procedure Note    PATIENT NAME: Marya Chu  : 1990  MRN: [id number]     Pre-op Diagnosis:   1  Macromastia      Post-op Diagnosis:   1  Macromastia        Surgeon:   Edwin Ellsworth MD  Assistants:     No qualified resident was available, Resident is only observing    Estimated Blood Loss: minimal  Findings: Successful us guided aspiration right breast with bloody serous fluid withdrawn  40cc withdrawn with some residual left since it was loculated likely representing a hematoma or a combination hematoma/seroma  If the fluid re-accumulates, the patient may return and a drain can be placed      Specimens: 40cc serosanguinous fluid    Complications:  None immediate    Anesthesia: Conscious sedation    Edwin Ellsworth MD     Date: 3/22/2019  Time: 12:56 PM

## 2019-03-22 NOTE — INTERVAL H&P NOTE
Update: (This section must be completed if the H&P was completed greater than 24 hrs to procedure or admission)    H&P reviewed  After examining the patient, I find no changed to the H&P since it had been written  Patient re-evaluated   Accept as history and physical     Rudy Sykes MD/March 25, 2019/12:56 PM

## 2019-03-22 NOTE — DISCHARGE INSTRUCTIONS
Fine Needle Aspiration of Right Breast  WHAT YOU NEED TO KNOW:   You may be sore or have bruising or swelling for a few days  DISCHARGE INSTRUCTIONS:   Seek care immediately if:   · Blood soaks through your bandage  · Your bruise suddenly gets larger and feels hard  · You have severe pain where the biopsy was taken  Contact your healthcare provider if:   · You have a fever or chills  · Your biopsy site is red, swollen, or draining pus  · You have nausea or are vomiting  · Your skin is itchy, swollen, or you have a rash  · Your pain does not get better after you take pain medicine  · You have questions or concerns about your condition or care  Medicines: You may need any of the following:  · NSAIDs , such as ibuprofen, help decrease swelling, pain, and fever  This medicine is available with or without a doctor's order  NSAIDs can cause stomach bleeding or kidney problems in certain people  If you take blood thinner medicine, always ask your healthcare provider if NSAIDs are safe for you  Always read the medicine label and follow directions  · Acetaminophen  decreases pain and fever  It is available without a doctor's order  Ask how much to take and how often to take it  Follow directions  Read the labels of all other medicines you are using to see if they also contain acetaminophen, or ask your doctor or pharmacist  Acetaminophen can cause liver damage if not taken correctly  Do not use more than 4 grams (4,000 milligrams) total of acetaminophen in one day  · Prescription pain medicine  may be given  Ask your healthcare provider how to take this medicine safely  Some prescription pain medicines contain acetaminophen  Do not take other medicines that contain acetaminophen without talking to your healthcare provider  Too much acetaminophen may cause liver damage  Prescription pain medicine may cause constipation  Ask your healthcare provider how to prevent or treat constipation  · Take your medicine as directed  Contact your healthcare provider if you think your medicine is not helping or if you have side effects  Tell him or her if you are allergic to any medicine  Keep a list of the medicines, vitamins, and herbs you take  Include the amounts, and when and why you take them  Bring the list or the pill bottles to follow-up visits  Carry your medicine list with you in case of an emergency  Care for your biopsy site as directed: If you have a tight-fitting bandage, you can remove it in 24 to 48 hours, or as directed  Ask your healthcare provider when your biopsy site can get wet  Carefully wash around the site with soap and water  It is okay to let soap and water gently run over your biopsy site  Do not  scrub the site  Dry the area and put on new, clean bandages as directed  Change your bandages when they get wet or dirty  Check your biopsy site every day for signs of infection such as redness, swelling, or pus  Do not put powders or lotions on your biopsy site  Self-care:   · Apply ice  on your biopsy site for 15 to 20 minutes every hour or as directed  Use an ice pack, or put crushed ice in a plastic bag  Cover it with a towel before you apply it to your skin  Ice helps prevent tissue damage and decreases swelling and pain  · Rest  as directed  Do not lift anything heavier than 5 pounds, play sports, or exercise  These activities may cause bleeding  Short walks around the house are okay  Ask your healthcare provider when you can return to your normal activities  ·   Follow up with your healthcare provider as directed:  Write down your questions so you remember to ask them during your visits  © 2017 [employer]  Information is for End User's use only and may not be sold, redistributed or otherwise used for commercial purposes  All illustrations and images included in CareNotes® are the copyrighted property of [employer]  or [employer]    The above information is an  only  It is not intended as medical advice for individual conditions or treatments  Talk to your doctor, nurse or pharmacist before following any medical regimen to see if it is safe and effective for you

## 2019-03-24 LAB
BACTERIA SPEC BFLD CULT: ABNORMAL
GRAM STN SPEC: ABNORMAL
GRAM STN SPEC: ABNORMAL

## 2019-03-26 ENCOUNTER — OFFICE VISIT (OUTPATIENT)
Dept: PLASTIC SURGERY | Facility: CLINIC | Age: 29
End: 2019-03-26

## 2019-03-26 VITALS — WEIGHT: 205 LBS | BODY MASS INDEX: 34.16 KG/M2 | HEIGHT: 65 IN

## 2019-03-26 DIAGNOSIS — Z98.890 S/P BILATERAL BREAST REDUCTION: Primary | ICD-10-CM

## 2019-03-26 PROCEDURE — 99024 POSTOP FOLLOW-UP VISIT: CPT | Performed by: PHYSICIAN ASSISTANT

## 2019-03-26 NOTE — PROGRESS NOTES
03/12/19 0810               Procedure: REDUCTION MAMMOPLASTY BREAST (Bilateral Breast       She is now status post  ultrasound-guided aspiration of the right breast fluid collection, yielding 40 cc of dark red serous fluid  Fluid showed a few colonies of Serratia  She feels like the swelling of the right breast has improved  The ecchymosis in the skin is resolving  The Prineos were removed today, except for the one on the right vertical incision  There is an open area to the right of the vertical incision of the right breast   I believe this was a skin tear, in part due to the swelling  Bacitracin Xeroform were applied to this area as well as to the T point  Aquaphor was applied to the remainder of the incisions  I explained that I expect the loculated hematoma to resolve over time  She will follow up with us in 2 weeks

## 2019-04-01 ENCOUNTER — OFFICE VISIT (OUTPATIENT)
Dept: OBGYN CLINIC | Facility: CLINIC | Age: 29
End: 2019-04-01

## 2019-04-01 VITALS
DIASTOLIC BLOOD PRESSURE: 77 MMHG | WEIGHT: 202 LBS | BODY MASS INDEX: 33.66 KG/M2 | HEIGHT: 65 IN | HEART RATE: 83 BPM | SYSTOLIC BLOOD PRESSURE: 122 MMHG

## 2019-04-01 DIAGNOSIS — Z11.3 ROUTINE SCREENING FOR STI (SEXUALLY TRANSMITTED INFECTION): ICD-10-CM

## 2019-04-01 DIAGNOSIS — Z01.419 ENCOUNTER FOR GYNECOLOGICAL EXAMINATION WITHOUT ABNORMAL FINDING: Primary | ICD-10-CM

## 2019-04-01 PROCEDURE — 87491 CHLMYD TRACH DNA AMP PROBE: CPT | Performed by: NURSE PRACTITIONER

## 2019-04-01 PROCEDURE — 99395 PREV VISIT EST AGE 18-39: CPT | Performed by: NURSE PRACTITIONER

## 2019-04-01 PROCEDURE — G0145 SCR C/V CYTO,THINLAYER,RESCR: HCPCS | Performed by: NURSE PRACTITIONER

## 2019-04-01 PROCEDURE — 87591 N.GONORRHOEAE DNA AMP PROB: CPT | Performed by: NURSE PRACTITIONER

## 2019-04-02 LAB
C TRACH DNA SPEC QL NAA+PROBE: NEGATIVE
N GONORRHOEA DNA SPEC QL NAA+PROBE: NEGATIVE

## 2019-04-04 LAB
LAB AP GYN PRIMARY INTERPRETATION: NORMAL
LAB AP LMP: NORMAL
Lab: NORMAL
PATH INTERP SPEC-IMP: NORMAL

## 2019-04-05 ENCOUNTER — OFFICE VISIT (OUTPATIENT)
Dept: PLASTIC SURGERY | Facility: CLINIC | Age: 29
End: 2019-04-05

## 2019-04-05 VITALS — WEIGHT: 202 LBS | BODY MASS INDEX: 33.66 KG/M2 | HEIGHT: 65 IN

## 2019-04-05 DIAGNOSIS — Z98.890 S/P BILATERAL BREAST REDUCTION: Primary | ICD-10-CM

## 2019-04-05 PROCEDURE — 99024 POSTOP FOLLOW-UP VISIT: CPT | Performed by: PHYSICIAN ASSISTANT

## 2019-04-23 ENCOUNTER — OFFICE VISIT (OUTPATIENT)
Dept: PLASTIC SURGERY | Facility: CLINIC | Age: 29
End: 2019-04-23

## 2019-04-23 DIAGNOSIS — Z98.890 S/P BILATERAL BREAST REDUCTION: Primary | ICD-10-CM

## 2019-04-23 PROCEDURE — 99024 POSTOP FOLLOW-UP VISIT: CPT | Performed by: PHYSICIAN ASSISTANT

## 2019-05-08 ENCOUNTER — OFFICE VISIT (OUTPATIENT)
Dept: PLASTIC SURGERY | Facility: CLINIC | Age: 29
End: 2019-05-08

## 2019-05-08 VITALS — HEIGHT: 65 IN | WEIGHT: 202 LBS | BODY MASS INDEX: 33.66 KG/M2

## 2019-05-08 DIAGNOSIS — N62 LARGE BREASTS: ICD-10-CM

## 2019-05-08 DIAGNOSIS — N62 MACROMASTIA: Primary | ICD-10-CM

## 2019-05-08 DIAGNOSIS — Z98.890 S/P BILATERAL BREAST REDUCTION: ICD-10-CM

## 2019-05-08 PROCEDURE — 99024 POSTOP FOLLOW-UP VISIT: CPT | Performed by: SURGERY

## 2019-05-09 DIAGNOSIS — IMO0001 BIRTH CONTROL: Primary | ICD-10-CM

## 2019-05-09 RX ORDER — MEDROXYPROGESTERONE ACETATE 150 MG/ML
150 INJECTION, SUSPENSION INTRAMUSCULAR
Qty: 1 ML | Refills: 3 | Status: SHIPPED | OUTPATIENT
Start: 2019-05-09 | End: 2020-01-09 | Stop reason: SDUPTHER

## 2019-05-10 ENCOUNTER — CLINICAL SUPPORT (OUTPATIENT)
Dept: OBGYN CLINIC | Facility: CLINIC | Age: 29
End: 2019-05-10

## 2019-05-10 DIAGNOSIS — Z30.42 ENCOUNTER FOR DEPO-PROVERA CONTRACEPTION: Primary | ICD-10-CM

## 2019-05-10 LAB — SL AMB POCT URINE HCG: NEGATIVE

## 2019-05-10 PROCEDURE — 81025 URINE PREGNANCY TEST: CPT

## 2019-05-10 PROCEDURE — 96372 THER/PROPH/DIAG INJ SC/IM: CPT

## 2019-05-10 RX ORDER — MEDROXYPROGESTERONE ACETATE 150 MG/ML
150 INJECTION, SUSPENSION INTRAMUSCULAR ONCE
Status: COMPLETED | OUTPATIENT
Start: 2019-05-10 | End: 2019-05-10

## 2019-05-10 RX ADMIN — MEDROXYPROGESTERONE ACETATE 150 MG: 150 INJECTION, SUSPENSION INTRAMUSCULAR at 10:20

## 2019-05-24 ENCOUNTER — TELEPHONE (OUTPATIENT)
Dept: PLASTIC SURGERY | Facility: CLINIC | Age: 29
End: 2019-05-24

## 2019-05-30 ENCOUNTER — TELEPHONE (OUTPATIENT)
Dept: OBGYN CLINIC | Facility: CLINIC | Age: 29
End: 2019-05-30

## 2019-08-08 ENCOUNTER — CLINICAL SUPPORT (OUTPATIENT)
Dept: OBGYN CLINIC | Facility: CLINIC | Age: 29
End: 2019-08-08

## 2019-08-08 ENCOUNTER — OFFICE VISIT (OUTPATIENT)
Dept: PLASTIC SURGERY | Facility: CLINIC | Age: 29
End: 2019-08-08

## 2019-08-08 VITALS — WEIGHT: 209 LBS | HEIGHT: 66 IN | BODY MASS INDEX: 33.59 KG/M2

## 2019-08-08 DIAGNOSIS — Z30.42 ENCOUNTER FOR SURVEILLANCE OF INJECTABLE CONTRACEPTIVE: Primary | ICD-10-CM

## 2019-08-08 DIAGNOSIS — Z98.890 S/P BILATERAL BREAST REDUCTION: Primary | ICD-10-CM

## 2019-08-08 LAB — SL AMB POCT URINE HCG: NEGATIVE

## 2019-08-08 PROCEDURE — 99024 POSTOP FOLLOW-UP VISIT: CPT | Performed by: PHYSICIAN ASSISTANT

## 2019-08-08 PROCEDURE — 81025 URINE PREGNANCY TEST: CPT

## 2019-08-08 PROCEDURE — 96372 THER/PROPH/DIAG INJ SC/IM: CPT

## 2019-08-08 RX ORDER — MEDROXYPROGESTERONE ACETATE 150 MG/ML
150 INJECTION, SUSPENSION INTRAMUSCULAR ONCE
Status: COMPLETED | OUTPATIENT
Start: 2019-08-08 | End: 2019-08-08

## 2019-08-08 RX ADMIN — MEDROXYPROGESTERONE ACETATE 150 MG: 150 INJECTION, SUSPENSION INTRAMUSCULAR at 10:23

## 2019-08-08 NOTE — PATIENT INSTRUCTIONS
Medroxyprogesterone (By injection)   Medroxyprogesterone ([id number]-xikk-ko-proe-SCOOBY-ter-one)  Prevents pregnancy  Also treats endometriosis and is used with other medicines to help relieve symptoms of cancer, including uterine or kidney cancer  Brand Name(s): Depo-Provera, Depo-Provera Contraceptive, Depo-SubQ Provera 104   There may be other brand names for this medicine  When This Medicine Should Not Be Used: This medicine is not right for everyone  You should not receive it if you had an allergic reaction to medroxyprogesterone or if you have a history of breast cancer or blood clots (including heart attack or stroke)  In most cases, you should not use this medicine while you are pregnant  How to Use This Medicine:   Injectable  · A nurse or other health provider will give you this medicine  This medicine is given as a shot into a muscle or just under the skin  · Your exact treatment schedule depends on the reason you are using this medicine  You doctor will explain your personal schedule  ¨ For treatment of cancer symptoms, you may start with a shot once per week  You may need fewer shots as your treatment goes forward  ¨ For birth control or endometriosis, you will need a shot every 3 months (13 weeks)  Read and follow the patient instructions that come with this medicine  Talk to your doctor or pharmacist if you have any questions  ¨ You might need to have the first shot during the first 5 days of your normal menstrual period, to make sure you are not pregnant  If you have just had a baby, you may receive a shot 5 days after birth if you are not breastfeeding or 6 weeks after birth if you are breastfeeding  · Missed dose: You must receive a shot every 3 months if you want to prevent pregnancy  Talk to your doctor or pharmacist if you do not receive your medicine on time, because you may need another form of birth control    Drugs and Foods to Avoid:   Ask your doctor or pharmacist before using any other medicine, including over-the-counter medicines, vitamins, and herbal products  · Some foods and medicines can affect how medroxyprogesterone works  Tell your doctor if you are using any of the following:  ¨ Aminoglutethimide, bosentan, carbamazepine, felbamate, griseofulvin, nefazodone, oxcarbazepine, phenobarbital, phenytoin, rifabutin, rifampin, rifapentine, Nova's wort, topiramate  ¨ Medicine to treat an infection (including clarithromycin, itraconazole, ketoconazole, telithromycin, voriconazole)  ¨ Medicine to treat HIV/AIDS (including atazanavir, indinavir, nelfinavir, ritonavir, saquinavir)  Warnings While Using This Medicine:   · Tell your doctor right away if you think you have become pregnant  · Tell your doctor if you are breastfeeding or if you have liver disease, kidney disease, asthma, diabetes, heart disease, seizures, migraine headaches, an eating disorder, osteoporosis, or a history of depression  Tell your doctor if you smoke  · This medicine may cause the following problems:  ¨ Blood clots, which could lead to stroke, heart attack, or other serious problems  ¨ Possible increased risk of breast cancer  ¨ Weak or thin bones, especially with long-term use  · You should not use this medicine for long-term birth control unless you cannot use any other form of birth control  · This medicine will not protect you from HIV/AIDS or other sexually transmitted diseases  · Tell any doctor or dentist who treats you that you are using this medicine  This medicine may affect certain medical test results  · Your doctor will check your progress and the effects of this medicine at regular visits  Keep all appointments    Possible Side Effects While Using This Medicine:   Call your doctor right away if you notice any of these side effects:  · Allergic reaction: Itching or hives, swelling in your face or hands, swelling or tingling in your mouth or throat, chest tightness, trouble breathing  · Chest pain, trouble breathing, or coughing up blood  · Dark urine or pale stools, nausea, vomiting, loss of appetite, stomach pain, yellow skin or eyes  · Heavy or nonstop vaginal bleeding  · Loss of vision, double vision  · Numbness or weakness on one side of your body, sudden or severe headache, problems with vision, speech, or walking  · Severe stomach pain or cramps  If you notice these less serious side effects, talk with your doctor:   · Headache  · Light or missed monthly periods, spotting between periods  · Nervousness or dizziness  · Pain, redness, burning, swelling, or a lump under your skin where the shot was given  · Weight gain  If you notice other side effects that you think are caused by this medicine, tell your doctor  Call your doctor for medical advice about side effects  You may report side effects to FDA at 9-086-FDA-5917  © 2017 [employer] Information is for End User's use only and may not be sold, redistributed or otherwise used for commercial purposes  The above information is an  only  It is not intended as medical advice for individual conditions or treatments  Talk to your doctor, nurse or pharmacist before following any medical regimen to see if it is safe and effective for you

## 2019-08-08 NOTE — PROGRESS NOTES
Depo-Provera      [x]   Patient provided box yes    Vial or Syringe    Last  Annual/Pap Date: 4/1/2019  [facility] Last Depo date: 05/10/2019   Side effects: no   HCG: yes  o if applicable: negative   Given by: EV   Site: right buttock     Calcium supplement daily teaching, condoms for 2 weeks following first injection dose

## 2019-08-08 NOTE — PROGRESS NOTES
03/12/19 0810         Procedure: REDUCTION MAMMOPLASTY BREAST (Bilateral Breast)     Incisions are healed  Pt complains about the scar under her right breast       The scar is wide, and is indurated in areas  She has not yet tried silicone scar products  She was given information on these today  She will apply them daily and will return to us in 3 months if she would like to discuss scar revision  She again was advised to stop smoking

## 2019-10-24 ENCOUNTER — CLINICAL SUPPORT (OUTPATIENT)
Dept: OBGYN CLINIC | Facility: CLINIC | Age: 29
End: 2019-10-24

## 2019-10-24 DIAGNOSIS — Z30.42 ENCOUNTER FOR DEPO-PROVERA CONTRACEPTION: Primary | ICD-10-CM

## 2019-10-24 PROCEDURE — 96372 THER/PROPH/DIAG INJ SC/IM: CPT

## 2019-10-24 RX ORDER — MEDROXYPROGESTERONE ACETATE 150 MG/ML
150 INJECTION, SUSPENSION INTRAMUSCULAR ONCE
Status: COMPLETED | OUTPATIENT
Start: 2019-10-24 | End: 2019-10-24

## 2019-10-24 RX ADMIN — MEDROXYPROGESTERONE ACETATE 150 MG: 150 INJECTION, SUSPENSION INTRAMUSCULAR at 16:20

## 2019-10-24 NOTE — PROGRESS NOTES
Depo-Provera      [x]   Patient provided box   o 1 Refills remain   Last  Annual Date: 04/01/19  [place] Last Depo date: 08/08/2019   Side effects: no   HCG: no  o if applicable: negative   Given by: Joelle Ba MA   Site: left gluteal region   LOT# [id number]     Calcium supplement daily teaching, condoms for 2 weeks following first injection dose

## 2019-10-24 NOTE — LETTER
[address] [place] [address] [address]  [phone]  Dept: [phone]    October 24, 2019     Patient: Teofilo Dyer   YOB: 1990   Date of Visit: 10/24/2019       To Whom it May Concern:    Teofilo Dyer is under my professional care  She was seen in the office on 10/24/2019  She may return to work on 10/25/2019 without limitations  If you have any questions or concerns, please don't hesitate to call           Sincerely,          [employer]

## 2019-12-18 NOTE — PROGRESS NOTES
Assessment/Plan:       annual due 2020     Diagnoses and all orders for this visit:    Vaginal yeast infection  -     fluconazole (DIFLUCAN) 150 mg tablet; Take 1 tablet (150 mg total) by mouth once for 1 dose  Reviewed daily yogurt or probiotic  Vaginal discharge  -     POCT wet mount  -     fluconazole (DIFLUCAN) 150 mg tablet; Take 1 tablet (150 mg total) by mouth once for 1 dose          Subjective:      Patient ID: Eileen Hartman is a 34 y o  female  HPI 71-year-old  001 with possible vaginal yeast infection  She has a thick white discharge  She used Monistat x 1 dose  She was on amoxicillin for a molar extraction  She has vaginal itching and external itching  the same partner declines any STD testing  She denies any pelvic pain     patient last received her Depo-Provera on 10/24/2019  Patient with history of bilateral breast reduction done on 2019,  She did have some complications on her right breast and has follow-up the new breast surgeon   she had her annual gyn exam     The following portions of the patient's history were reviewed and updated as appropriate: allergies, current medications, past family history, past medical history, past social history, past surgical history and problem list     Review of Systems   Respiratory: Negative  Cardiovascular: Negative  Gastrointestinal: Negative  Genitourinary: Positive for vaginal discharge  Negative for dysuria, frequency, pelvic pain, urgency and vaginal pain  Psychiatric/Behavioral: Negative  Objective: There were no vitals taken for this visit  Physical Exam   Constitutional: She appears well-nourished  Cardiovascular: Normal rate, regular rhythm and normal heart sounds  Pulmonary/Chest: Effort normal and breath sounds normal    Abdominal: Soft  There is no tenderness  Skin: Skin is warm and dry  External genitalia- no lesions negative erythema      Vagina-  white discharge,  No lesions   cervix- no lesions, negative CMT   uterus- anteverted, nontender small size   adnexa- no masses, nontender        But mount KOH positive for yeast,negative for BV, negative for trich   pH 4 5 PALPITATIONS

## 2019-12-19 ENCOUNTER — OFFICE VISIT (OUTPATIENT)
Dept: OBGYN CLINIC | Facility: CLINIC | Age: 29
End: 2019-12-19

## 2019-12-19 VITALS
HEART RATE: 80 BPM | HEIGHT: 66 IN | WEIGHT: 204 LBS | DIASTOLIC BLOOD PRESSURE: 84 MMHG | BODY MASS INDEX: 32.78 KG/M2 | SYSTOLIC BLOOD PRESSURE: 124 MMHG

## 2019-12-19 DIAGNOSIS — N89.8 VAGINAL DISCHARGE: ICD-10-CM

## 2019-12-19 DIAGNOSIS — B37.3 VAGINAL YEAST INFECTION: Primary | ICD-10-CM

## 2019-12-19 PROBLEM — B37.31 VAGINAL YEAST INFECTION: Status: ACTIVE | Noted: 2019-12-19

## 2019-12-19 LAB
BV WHIFF TEST VAG QL: ABNORMAL
CLUE CELLS SPEC QL WET PREP: NEGATIVE
PH SMN: 4.5 [PH]
SL AMB POCT WET MOUNT: ABNORMAL
T VAGINALIS VAG QL WET PREP: ABNORMAL
YEAST VAG QL WET PREP: ABNORMAL

## 2019-12-19 PROCEDURE — 99213 OFFICE O/P EST LOW 20 MIN: CPT | Performed by: NURSE PRACTITIONER

## 2019-12-19 PROCEDURE — 87210 SMEAR WET MOUNT SALINE/INK: CPT | Performed by: NURSE PRACTITIONER

## 2019-12-19 RX ORDER — FLUCONAZOLE 150 MG/1
150 TABLET ORAL ONCE
Qty: 1 TABLET | Refills: 1 | Status: SHIPPED | OUTPATIENT
Start: 2019-12-19 | End: 2019-12-19

## 2019-12-19 NOTE — PATIENT INSTRUCTIONS
Vulvovaginal Candidiasis   AMBULATORY CARE:   Vulvovaginal candidiasis,  or yeast infection, is a common vaginal infection  Vulvovaginal candidiasis is caused by a fungus, or yeast-like germ  Fungi are normally found in your vagina  When there are too many fungi, it can cause an infection  Seek care immediately if:   · You have fever and chills  · You are bleeding from your vagina and it is not your monthly period  · You develop abdominal or pelvic pain  Contact your healthcare provider if:   · Your signs and symptoms get worse, even after treatment  · You have questions or concerns about your condition or care  Signs and symptoms:   · Thick, white, cheese-like discharge from your vagina    · Itching, swelling, or redness in your vagina    · Burning when you urinate  Treatment for vulvovaginal candidiasis  includes medicines to treat the fungal infection and decrease inflammation  The medicine may be a pill, topical cream, or vaginal suppository  Manage your symptoms:   · Wear cotton underwear  · Keep the vaginal area clean and dry  · Do not have sex until your symptoms are gone  · Do not douche  · Do not use feminine hygiene sprays, powders, or bubble bath  Prevent another infection:   · Take showers instead of baths  · Eat yogurt  · Limit the amount of alcohol you drink  · Control your blood sugar if you are diabetic  · Limit your time in hot tubs  Follow up with your healthcare provider as directed:  Write down your questions so you remember to ask them during your visits  © 2017 [employer] Information is for End User's use only and may not be sold, redistributed or otherwise used for commercial purposes  All illustrations and images included in CareNotes® are the copyrighted property of [employer]  or [employer]  The above information is an  only   It is not intended as medical advice for individual conditions or treatments  Talk to your doctor, nurse or pharmacist before following any medical regimen to see if it is safe and effective for you

## 2020-01-09 ENCOUNTER — CLINICAL SUPPORT (OUTPATIENT)
Dept: OBGYN CLINIC | Facility: CLINIC | Age: 30
End: 2020-01-09

## 2020-01-09 DIAGNOSIS — Z30.42 ENCOUNTER FOR SURVEILLANCE OF INJECTABLE CONTRACEPTIVE: Primary | ICD-10-CM

## 2020-01-09 PROCEDURE — 96372 THER/PROPH/DIAG INJ SC/IM: CPT

## 2020-01-09 RX ORDER — MEDROXYPROGESTERONE ACETATE 150 MG/ML
150 INJECTION, SUSPENSION INTRAMUSCULAR ONCE
Status: COMPLETED | OUTPATIENT
Start: 2020-01-09 | End: 2020-01-09

## 2020-01-09 RX ORDER — MEDROXYPROGESTERONE ACETATE 150 MG/ML
150 INJECTION, SUSPENSION INTRAMUSCULAR
Qty: 1 ML | Refills: 0 | Status: SHIPPED | OUTPATIENT
Start: 2020-01-09 | End: 2020-07-06

## 2020-01-09 RX ADMIN — MEDROXYPROGESTERONE ACETATE 150 MG: 150 INJECTION, SUSPENSION INTRAMUSCULAR at 16:03

## 2020-01-09 NOTE — PATIENT INSTRUCTIONS
Medroxyprogesterone (By injection)   Medroxyprogesterone (xu-czjt-te-proe-SCOOBY-ter-one)  Prevents pregnancy  Also treats endometriosis and is used with other medicines to help relieve symptoms of cancer, including uterine or kidney cancer  Brand Name(s): Depo-Provera, Depo-Provera Contraceptive, Depo-SubQ Provera 104   There may be other brand names for this medicine  When This Medicine Should Not Be Used: This medicine is not right for everyone  You should not receive it if you had an allergic reaction to medroxyprogesterone or if you have a history of breast cancer or blood clots (including heart attack or stroke)  In most cases, you should not use this medicine while you are pregnant  How to Use This Medicine:   Injectable  · A nurse or other health provider will give you this medicine  This medicine is given as a shot into a muscle or just under the skin  · Your exact treatment schedule depends on the reason you are using this medicine  You doctor will explain your personal schedule  ¨ For treatment of cancer symptoms, you may start with a shot once per week  You may need fewer shots as your treatment goes forward  ¨ For birth control or endometriosis, you will need a shot every 3 months (13 weeks)  Read and follow the patient instructions that come with this medicine  Talk to your doctor or pharmacist if you have any questions  ¨ You might need to have the first shot during the first 5 days of your normal menstrual period, to make sure you are not pregnant  If you have just had a baby, you may receive a shot 5 days after birth if you are not breastfeeding or 6 weeks after birth if you are breastfeeding  · Missed dose: You must receive a shot every 3 months if you want to prevent pregnancy  Talk to your doctor or pharmacist if you do not receive your medicine on time, because you may need another form of birth control    Drugs and Foods to Avoid:   Ask your doctor or pharmacist before using any other medicine, including over-the-counter medicines, vitamins, and herbal products  · Some foods and medicines can affect how medroxyprogesterone works  Tell your doctor if you are using any of the following:  ¨ Aminoglutethimide, bosentan, carbamazepine, felbamate, griseofulvin, nefazodone, oxcarbazepine, phenobarbital, phenytoin, rifabutin, rifampin, rifapentine, Nova's wort, topiramate  ¨ Medicine to treat an infection (including clarithromycin, itraconazole, ketoconazole, telithromycin, voriconazole)  ¨ Medicine to treat HIV/AIDS (including atazanavir, indinavir, nelfinavir, ritonavir, saquinavir)  Warnings While Using This Medicine:   · Tell your doctor right away if you think you have become pregnant  · Tell your doctor if you are breastfeeding or if you have liver disease, kidney disease, asthma, diabetes, heart disease, seizures, migraine headaches, an eating disorder, osteoporosis, or a history of depression  Tell your doctor if you smoke  · This medicine may cause the following problems:  ¨ Blood clots, which could lead to stroke, heart attack, or other serious problems  ¨ Possible increased risk of breast cancer  ¨ Weak or thin bones, especially with long-term use  · You should not use this medicine for long-term birth control unless you cannot use any other form of birth control  · This medicine will not protect you from HIV/AIDS or other sexually transmitted diseases  · Tell any doctor or dentist who treats you that you are using this medicine  This medicine may affect certain medical test results  · Your doctor will check your progress and the effects of this medicine at regular visits  Keep all appointments    Possible Side Effects While Using This Medicine:   Call your doctor right away if you notice any of these side effects:  · Allergic reaction: Itching or hives, swelling in your face or hands, swelling or tingling in your mouth or throat, chest tightness, trouble breathing  · Chest pain, trouble breathing, or coughing up blood  · Dark urine or pale stools, nausea, vomiting, loss of appetite, stomach pain, yellow skin or eyes  · Heavy or nonstop vaginal bleeding  · Loss of vision, double vision  · Numbness or weakness on one side of your body, sudden or severe headache, problems with vision, speech, or walking  · Severe stomach pain or cramps  If you notice these less serious side effects, talk with your doctor:   · Headache  · Light or missed monthly periods, spotting between periods  · Nervousness or dizziness  · Pain, redness, burning, swelling, or a lump under your skin where the shot was given  · Weight gain  If you notice other side effects that you think are caused by this medicine, tell your doctor  Call your doctor for medical advice about side effects  You may report side effects to FDA at 6-693-FDA-1890  © 2017 [employer] Information is for End User's use only and may not be sold, redistributed or otherwise used for commercial purposes  The above information is an  only  It is not intended as medical advice for individual conditions or treatments  Talk to your doctor, nurse or pharmacist before following any medical regimen to see if it is safe and effective for you

## 2020-01-16 ENCOUNTER — OFFICE VISIT (OUTPATIENT)
Dept: PLASTIC SURGERY | Facility: CLINIC | Age: 30
End: 2020-01-16
Payer: COMMERCIAL

## 2020-01-16 VITALS
SYSTOLIC BLOOD PRESSURE: 142 MMHG | WEIGHT: 200.8 LBS | BODY MASS INDEX: 32.27 KG/M2 | RESPIRATION RATE: 14 BRPM | HEART RATE: 86 BPM | HEIGHT: 66 IN | DIASTOLIC BLOOD PRESSURE: 82 MMHG

## 2020-01-16 DIAGNOSIS — Z98.890 S/P BILATERAL BREAST REDUCTION: Primary | ICD-10-CM

## 2020-01-16 PROCEDURE — 99203 OFFICE O/P NEW LOW 30 MIN: CPT | Performed by: PLASTIC SURGERY

## 2020-01-16 NOTE — PROGRESS NOTES
Assessment/Plan   Diagnoses and all orders for this visit:    S/P bilateral breast reduction    I discussed that I felt that overall she has satisfactory result and resolution of her symptoms  She does had significant widening and secondary healed scar at right breast T-point that would benefit from scar revision , which will improve the appearance and more symmetry to contralateral breast      I discussed that this could be performed as outpatient surgery     I discussed risks including but not limited to: bleeding, infection, hematoma, seroma, wound breakdown, scar, need for further surgery, deformity, pain, numbness, weakness, asymmetry, injury to structures, and medical complications  I discussed that the scar involved would be larger than the maximal dimension of the lesion itself  The patient would like to proceed and therefore we will initiate the insurance approval process  The patient is a smoker and was informed that the surgery would only be done if they were able to completely refrain from smoking and all nicotine products for 4 weeks before surgery  The patient understands that urine testing will be done to confirm abstinence from smoking and that surgery will be cancelled if the patient is positive for nicotine at any time  The patient also understands that they will have to refrain from smoking until all of the wounds are healed, at least 4 weeks following their surgery  They also understand that, even if smoking and nicotine is quit, the risk for complications is increased due to their smoking history  A total of 30 minutes of face-to-face time was spent in this encounter, of which >50% was spent in counseling  [place] [id number]   [address] [place] [address]   Office: [phone]          Subjective   Patient ID: Arenrique Cormier is a 34 y o  female      Vitals:    01/16/20 1133   BP: 142/82   Pulse: 86 Resp: 14     Christine Pearce is a 34 y o  female presenting for reduction mammaplasty revision  Patient had symptomatic bilateral macromastia and underwent bilateral reduction mammoplasty on 3/12/2019 by Dr Valentín rivers, inferior pedicle with Right breast 702 3 g, left breast 693 2 g resection  Postoperative course was complicated by right breast wound dehiscence in setting of active smoker at the time  She has healed well with local wound care but complaints of persistent itchiness, pain and discomfort at lower breast and scar appearance  Patient would like to evaluate options for revision  The following portions of the patient's history were reviewed and updated as appropriate: allergies, current medications, past family history, past medical history, past social history, past surgical history and problem list     Review of Systems   Constitutional: Negative  HENT: Negative  Eyes: Negative  Respiratory: Negative  Cardiovascular: Negative  Gastrointestinal: Negative  Endocrine: Negative  Genitourinary: Negative  Musculoskeletal: Negative  Skin: Negative  Allergic/Immunologic: Negative  Neurological: Negative  Hematological: Negative  Psychiatric/Behavioral: Negative  Objective   Physical Exam   Constitutional  She appears well-developed and well-nourished  Cardiovascular: Normal rate  Pulmonary/Chest  Effort normal      Skin  Skin is warm  Psychiatric  She has a normal mood and affect   Her behavior is normal  Judgment and thought content normal      Breast  Breast Measurements:   Right Left   A: Sternal notch to nipple distance (cm) 23 5 23   B: Midsternum to nipple distance (cm)     C: Midclavicle to nipple distance (cm)     D: Nipple to inframammary fold (cm) 11 10 5   E: Base width (cm)     F: Inframammary distance (cm) 21 5      Right Left   Areolar diameter (cm)     Nipple diameter (cm)     Superior tissue pinch test (cm)     Breast ptosis (grade 0-3) 0 0         Her breasts have normal envelope compliance

## 2020-03-02 ENCOUNTER — TELEPHONE (OUTPATIENT)
Dept: DERMATOLOGY | Facility: CLINIC | Age: 30
End: 2020-03-02

## 2020-07-02 ENCOUNTER — TELEPHONE (OUTPATIENT)
Dept: OBGYN CLINIC | Facility: CLINIC | Age: 30
End: 2020-07-02

## 2020-07-06 ENCOUNTER — ANNUAL EXAM (OUTPATIENT)
Dept: OBGYN CLINIC | Facility: CLINIC | Age: 30
End: 2020-07-06

## 2020-07-06 VITALS
DIASTOLIC BLOOD PRESSURE: 83 MMHG | HEIGHT: 66 IN | HEART RATE: 72 BPM | WEIGHT: 196.6 LBS | TEMPERATURE: 97 F | BODY MASS INDEX: 31.6 KG/M2 | SYSTOLIC BLOOD PRESSURE: 129 MMHG

## 2020-07-06 DIAGNOSIS — Z98.890 S/P BILATERAL BREAST REDUCTION: ICD-10-CM

## 2020-07-06 DIAGNOSIS — Z01.419 ENCOUNTER FOR GYNECOLOGICAL EXAMINATION WITHOUT ABNORMAL FINDING: Primary | ICD-10-CM

## 2020-07-06 PROBLEM — Z11.3 ROUTINE SCREENING FOR STI (SEXUALLY TRANSMITTED INFECTION): Status: RESOLVED | Noted: 2019-04-01 | Resolved: 2020-07-06

## 2020-07-06 PROBLEM — N89.8 VAGINAL DISCHARGE: Status: RESOLVED | Noted: 2019-12-19 | Resolved: 2020-07-06

## 2020-07-06 PROCEDURE — 99395 PREV VISIT EST AGE 18-39: CPT | Performed by: NURSE PRACTITIONER

## 2020-07-06 RX ORDER — DIPHENOXYLATE HYDROCHLORIDE AND ATROPINE SULFATE 2.5; .025 MG/1; MG/1
1 TABLET ORAL DAILY
COMMUNITY
End: 2021-12-02

## 2020-07-06 NOTE — PROGRESS NOTES
ASSESSMENT & PLAN: Yuki Rivera is a 34 y o  Garry Vivianal with normal gynecologic exam     1   Routine well woman exam done today  2  Pap and HPV:  The patient's last pap was  04/01/2019  It was normal    Previous Pap done 03/03/2016 was negative  Pap was not done today  History of abnormal Pap when she was 23to 21years old  Current ASCCP Guidelines reviewed  2022  3  The following were reviewed in today's visit: breast self exam, STD testing, HIV risk factors and prevention,  adequate intake of calcium and vitamin D, exercise and healthy diet  4   Gardasil vaccine- patient has been vaccinated  5  Patient desires pregnancy- takes daily multivitamin  Patient had chickenpox as a child  Recommended to quit smoking- 1/2 PPD- information provided on AVS      CC:  Annual Gynecologic Examination    HPI: Yuki Rivera is a 34 y o  Garry Amanuel who presents for annual gynecologic examination  She denies abnormal Pap history, her last Pap was done 04/01/2019 Patient was last seen 12/19/2019 and treated for yeast  infection   Patient last received Depo 01/09/2020  Patient with history of bilateral breast reduction done 03/12/2019 and did have complications in her right breast she was to have follow-up with a new breast surgeon  She has the following concerns: she desires to get pregnant  Her partner not fathered any children  Together for 1 year  Has been amenorrheic since her last Depo  Recommended for patient to monitor and call if persistent, reviewed it may take up to 15 months after last Depo to achieve a pregnancy  Health Maintenance:    She wears her seatbelt routinely  She does perform irregular monthly self breast exams  She feels safe at home  BMI Counseling: Body mass index is 31 73 kg/m²  The BMI is above normal  Nutrition recommendations include reducing fast food intake and moderation in carbohydrate intake  Exercise recommendations include exercising 3-5 times per week      Past Medical History:   Diagnosis Date    Depression     Kidney stone     Seasonal allergies        Past Surgical History:   Procedure Laterality Date    IR IMAGE GUIDED ASPIRATION / DRAINAGE  3/22/2019    PA REDUCTION OF LARGE BREAST Bilateral 3/12/2019    Procedure: REDUCTION MAMMOPLASTY BREAST;  Surgeon: Freddie Hirsch MD;  Location[facility];  Service: [address] [place] [address] Road    as a child to remove abcess; cyst removal    WISDOM TOOTH EXTRACTION      3       Past OB/Gyn History:  OB History        1    Para   1    Term   1            AB        Living   1       SAB        TAB        Ectopic        Multiple        Live Births   1               Pt has menstrual issues  Amenorrhea post Depo   History of sexually transmitted infection: No   History of abnormal pap smears: No      Patient is currently sexually active  heterosexual   The current method of family planning is none      Family History   Problem Relation Age of Onset    Hypertension Mother     No Known Problems Father     Cervical cancer Maternal Grandmother     Diabetes Maternal Grandmother     Kidney disease Maternal Grandmother        Social History:  Social History     Socioeconomic History    Marital status: Single     Spouse name: Not on file    Number of children: Not on file    Years of education: 15    Highest education level: Not on file   Occupational History    Occupation: [employer]   Social Needs    Financial resource strain: Not on file    Food insecurity:     Worry: Not on file     Inability: Not on file   [employer] needs:     Medical: Not on file     Non-medical: Not on file   Tobacco Use    Smoking status: Current Every Day Smoker     Packs/day: 0 50     Years: 11 00     Pack years: 5 50     Types: Cigarettes    Smokeless tobacco: Never Used    Tobacco comment: 1 PPD; smoked since age 15- per [employer]   Substance and Sexual Activity    Alcohol use: Yes     Frequency: 2-4 times a month     Drinks per session: 1 or 2     Binge frequency: Never     Comment: socially    Drug use: No    Sexual activity: Yes     Partners: Male     Birth control/protection: None   Lifestyle    Physical activity:     Days per week: Not on file     Minutes per session: Not on file    Stress: Not on file   Relationships    Social connections:     Talks on phone: Not on file     Gets together: Not on file     Attends Congregational service: Not on file     Active member of club or organization: Not on file     Attends meetings of clubs or organizations: Not on file     Relationship status: Not on file    Intimate partner violence:     Fear of current or ex partner: Not on file     Emotionally abused: Not on file     Physically abused: Not on file     Forced sexual activity: Not on file   Other Topics Concern    Not on file   Social History Narrative    · Most recent tobacco use screenin2019        · Sexual orientation:   Heterosexual      · Exercise level: Moderate      · Diet:   Regular      · General stress level:   High        · Alcohol intake:   Occasional      · Caffeine intake: Moderate       · Guns present in home:   No      · Seat belts used routinely:   Yes      · Sunscreen used routinely:   No      · Smoke alarm in home: Yes      · Advance directive:   No      Presently lives with  family  Patient is single    Patient is currently employed     Allergies   Allergen Reactions    Medical Tape Rash and Blisters         Current Outpatient Medications:     acetaminophen (TYLENOL) 650 mg CR tablet, Take 1 tablet (650 mg total) by mouth every 8 (eight) hours as needed for mild pain (Patient not taking: Reported on 2019), Disp: 30 tablet, Rfl: 0    cyclobenzaprine (FLEXERIL) 5 mg tablet, Take 1 tablet by mouth 3 (three) times a day as needed for muscle spasms for up to 3 days 1-2 PO TID PRN (Patient not taking: Reported on 2020), Disp: 12 tablet, Rfl: 0    medroxyPROGESTERone (DEPO-PROVERA) 150 mg/mL injection, Inject 1 mL (150 mg total) into a muscle every 3 (three) months, Disp: 1 mL, Rfl: 0    naproxen (NAPROSYN) 500 mg tablet, Take 1 tablet (500 mg total) by mouth 2 (two) times a day with meals (Patient not taking: Reported on 12/19/2019), Disp: 30 tablet, Rfl: 0      Review of Systems  Constitutional :no fever, feels well, no tiredness, no recent weight gain or loss  ENT: no ear ache, no loss of hearing, no nosebleeds or nasal discharge, no sore throat or hoarseness  Cardiovascular: no complaints of slow or fast heart beat, no chest pain, no palpitations, no leg claudication or lower extremity edema  Respiratory: no complaints of shortness of shortness of breath, no MARTINEZ  Breasts:no complaints of breast pain, breast lump, or nipple discharge  Gastrointestinal: no complaints of abdominal pain, constipation, nausea, vomiting, or diarrhea or bloody stools  Genitourinary : no complaints of dysuria, incontinence, pelvic pain, no dysmenorrhea, vaginal discharge or abnormal vaginal bleeding and as noted in HPI  Musculoskeletal: no complaints of arthralgia, no myalgia, no joint swelling or stiffness, no limb pain or swelling  Integumentary: no complaints of skin rash or lesion, itching or dry skin  Neurological: no complaints of headache, no confusion, no numbness or tingling, no dizziness or fainting    Objective      There were no vitals taken for this visit    General:   appears stated age, cooperative, alert normal mood and affect   Neck: normal, supple,trachea midline, no masses   Heart: regular rate and rhythm, S1, S2 normal, no murmur, click, rub or gallop   Lungs: clear to auscultation bilaterally   Breasts:  scars from bilateral breast reduction, right breast with a lot of scarring  Present lower aspect,  No mass palpated    Abdomen: soft, non-tender, without masses or organomegaly   Vulva: normal female genitalia, Bartholin's, Urethra, [place] normal, no lesions   Vagina: normal vagina, no discharge, exudate, lesion, or erythema   Urethra: normal   Cervix: Normal, no discharge  Nontender  Uterus: normal size, contour, position, consistency, mobility, non-tender and anteverted   Adnexa: normal adnexa and no mass, fullness, tenderness   Lymphatic palpation of lymph nodes in neck, axilla, groin and/or other locations: no lymphadenopathy or masses noted   Skin normal skin turgor and no rashes     Psychiatric orientation to person, place, and time: normal  mood and affect: normal

## 2020-07-06 NOTE — PATIENT INSTRUCTIONS
Cigarette Smoking and Your Health, Ambulatory Care   GENERAL INFORMATION:   What are the risks to my health if I smoke? Chemicals in tobacco are addictive and damage every cell in your body  All tobacco products are dangerous to you and to nonsmokers who breathe your secondhand smoke  Even if you are a light smoker or a social smoker, you have an increased risk for cancer, heart disease, and lung disease  If you are pregnant or have diabetes, smoking increases your risk for complications  What are the benefits to my health if I stop smoking? · Lower risk of cancer, heart disease, blood clots, heart attack, and stroke    · Lower risk of diabetes complications, such as kidney, artery, or eye disease, and nerve damage that can result in amputations    · Lower risk of lung infections and diseases, such as pneumonia, asthma, chronic bronchitis, and emphysema           · Increased benefits of chemotherapy if you already have cancer, and decreased risk for cancer returning after treatment    · Improved circulation, allowing more oxygen to be delivered to your body    · Improved ability to heal and to fight infections  What are the benefits to the health of others if I stop smoking? · Lower risk of lung cancer and heart disease in nonsmokers    · Lower risk of miscarriage, early delivery, low birth weight, and stillbirth    · Lower risk of SIDS, obesity, developmental delay, ear infections, colds, pneumonia, bronchitis, asthma, and ADHD in your child  Where can I find more information and support to stop smoking? It is never too late to stop smoking  Ask your healthcare provider for more information if you need help  · [employer]  [place]  Phone: 8- 611 [phone] [phone]  Web Address: [employer]  Follow up with your healthcare provider as directed:  Write down your questions so you remember to ask them during your visits  CARE AGREEMENT:   You have the right to help plan your care   Learn about your health condition and how it may be treated  Discuss treatment options with your caregivers to decide what care you want to receive  You always have the right to refuse treatment  The above information is an  only  It is not intended as medical advice for individual conditions or treatments  Talk to your doctor, nurse or pharmacist before following any medical regimen to see if it is safe and effective for you  © 2014 [address] is for End User's use only and may not be sold, redistributed or otherwise used for commercial purposes  All illustrations and images included in CareNotes® are the copyrighted property of [employer] D A M [employer] Inc  or Delfino Sal[employer]

## 2020-09-14 ENCOUNTER — OFFICE VISIT (OUTPATIENT)
Dept: FAMILY MEDICINE CLINIC | Facility: CLINIC | Age: 30
End: 2020-09-14
Payer: COMMERCIAL

## 2020-09-14 VITALS
HEIGHT: 66 IN | HEART RATE: 83 BPM | RESPIRATION RATE: 16 BRPM | BODY MASS INDEX: 31.02 KG/M2 | SYSTOLIC BLOOD PRESSURE: 130 MMHG | WEIGHT: 193 LBS | OXYGEN SATURATION: 98 % | TEMPERATURE: 98.9 F | DIASTOLIC BLOOD PRESSURE: 84 MMHG

## 2020-09-14 DIAGNOSIS — R42 VERTIGO: Primary | ICD-10-CM

## 2020-09-14 DIAGNOSIS — E16.2 HYPOGLYCEMIA, UNSPECIFIED: ICD-10-CM

## 2020-09-14 DIAGNOSIS — R53.83 OTHER FATIGUE: ICD-10-CM

## 2020-09-14 LAB
SL AMB  POCT GLUCOSE, UA: ABNORMAL
SL AMB LEUKOCYTE ESTERASE,UA: ABNORMAL
SL AMB POCT BILIRUBIN,UA: ABNORMAL
SL AMB POCT BLOOD,UA: ABNORMAL
SL AMB POCT CLARITY,UA: ABNORMAL
SL AMB POCT COLOR,UA: YELLOW
SL AMB POCT KETONES,UA: ABNORMAL
SL AMB POCT NITRITE,UA: ABNORMAL
SL AMB POCT PH,UA: 7
SL AMB POCT SPECIFIC GRAVITY,UA: 1.01
SL AMB POCT URINE PROTEIN: ABNORMAL
SL AMB POCT UROBILINOGEN: 0.2

## 2020-09-14 PROCEDURE — 81002 URINALYSIS NONAUTO W/O SCOPE: CPT | Performed by: FAMILY MEDICINE

## 2020-09-14 PROCEDURE — 3725F SCREEN DEPRESSION PERFORMED: CPT | Performed by: FAMILY MEDICINE

## 2020-09-14 PROCEDURE — 99214 OFFICE O/P EST MOD 30 MIN: CPT | Performed by: FAMILY MEDICINE

## 2020-09-14 NOTE — LETTER
September 14, 2020     Patient: Jewel Ospina   YOB: 1990   Date of Visit: 9/14/2020       To Whom it May Concern:    Jewel Ospina is under my professional care  She was seen in my office on 9/14/2020  She may return to work on 9/15/2020  If you have any questions or concerns, please don't hesitate to call           Sincerely,          LUIS Jorge        CC: No Recipients

## 2020-09-14 NOTE — PROGRESS NOTES
Assessment/Plan:  Hypoglycemia due to unbalanced diet intake  Lora recently started a keto type diet and was advised to avoid long periods of fasting and focus on a more balanced diet including some carbohydrates  She is advised to keep a snack handy  Also advised smoking while trying to become pregnant is ill advised  She declined any smoking cessation medications or discussion  Get her routine labs done and will fu back in the office if symptoms fail to improve  No problem-specific Assessment & Plan notes found for this encounter  Problem List Items Addressed This Visit     None      Visit Diagnoses     Vertigo    -  Primary    Relevant Orders    POCT urine dip (Completed)    Other fatigue        Relevant Orders    POCT urine dip (Completed)    CBC and differential    Comprehensive metabolic panel    Lipid panel    TSH, 3rd generation    UA w Reflex to Microscopic w Reflex to Culture            Subjective:      Patient ID: Jovany Alaniz is a 34 y o  female  Starting a few months ago with dizziness  She states her hands get clamy and she starts to feels sick  Has no PMH of low blood sugar  The following portions of the patient's history were reviewed and updated as appropriate: allergies, current medications, past family history, past medical history, past social history, past surgical history and problem list     Review of Systems   Constitutional: Negative for appetite change and fever  HENT: Negative for sinus pressure and sore throat  Eyes: Negative for pain  Respiratory: Negative for shortness of breath  Cardiovascular: Negative for chest pain  Gastrointestinal: Negative for abdominal pain  Genitourinary: Negative for dysuria  Musculoskeletal: Negative for arthralgias and myalgias  Skin: Negative for color change  Neurological: Negative for light-headedness  Psychiatric/Behavioral: Negative for behavioral problems           Objective:      /84 (BP Location: Left arm, Patient Position: Sitting, Cuff Size: Large)   Pulse 83   Temp 98 9 °F (37 2 °C) (Temporal)   Resp 16   Ht 5' 6" (1 676 m)   Wt 87 5 kg (193 lb)   SpO2 98%   BMI 31 15 kg/m²          Physical Exam  Vitals signs and nursing note reviewed  Constitutional:       General: She is not in acute distress  Appearance: She is well-developed  She is not diaphoretic  HENT:      Head: Normocephalic and atraumatic  Right Ear: External ear normal       Left Ear: External ear normal    Eyes:      Pupils: Pupils are equal, round, and reactive to light  Neck:      Musculoskeletal: Normal range of motion and neck supple  Cardiovascular:      Rate and Rhythm: Normal rate and regular rhythm  Pulmonary:      Effort: Pulmonary effort is normal       Breath sounds: Normal breath sounds  Abdominal:      Palpations: Abdomen is soft  Musculoskeletal: Normal range of motion  Skin:     General: Skin is dry  Neurological:      Mental Status: She is alert and oriented to person, place, and time  Psychiatric:         Behavior: Behavior normal          Thought Content:  Thought content normal

## 2020-09-17 ENCOUNTER — APPOINTMENT (OUTPATIENT)
Dept: LAB | Facility: MEDICAL CENTER | Age: 30
End: 2020-09-17
Payer: COMMERCIAL

## 2020-09-17 DIAGNOSIS — R53.83 OTHER FATIGUE: ICD-10-CM

## 2020-09-17 LAB
ALBUMIN SERPL BCP-MCNC: 3.9 G/DL (ref 3.5–5)
ALP SERPL-CCNC: 62 U/L (ref 46–116)
ALT SERPL W P-5'-P-CCNC: 19 U/L (ref 12–78)
ANION GAP SERPL CALCULATED.3IONS-SCNC: 5 MMOL/L (ref 4–13)
AST SERPL W P-5'-P-CCNC: 14 U/L (ref 5–45)
BASOPHILS # BLD AUTO: 0.03 THOUSANDS/ΜL (ref 0–0.1)
BASOPHILS NFR BLD AUTO: 1 % (ref 0–1)
BILIRUB SERPL-MCNC: 0.53 MG/DL (ref 0.2–1)
BILIRUB UR QL STRIP: NEGATIVE
BUN SERPL-MCNC: 13 MG/DL (ref 5–25)
CALCIUM SERPL-MCNC: 9.2 MG/DL (ref 8.3–10.1)
CHLORIDE SERPL-SCNC: 109 MMOL/L (ref 100–108)
CHOLEST SERPL-MCNC: 140 MG/DL (ref 50–200)
CLARITY UR: CLEAR
CO2 SERPL-SCNC: 25 MMOL/L (ref 21–32)
COLOR UR: NORMAL
CREAT SERPL-MCNC: 0.66 MG/DL (ref 0.6–1.3)
EOSINOPHIL # BLD AUTO: 0.23 THOUSAND/ΜL (ref 0–0.61)
EOSINOPHIL NFR BLD AUTO: 4 % (ref 0–6)
ERYTHROCYTE [DISTWIDTH] IN BLOOD BY AUTOMATED COUNT: 12.1 % (ref 11.6–15.1)
GFR SERPL CREATININE-BSD FRML MDRD: 120 ML/MIN/1.73SQ M
GLUCOSE P FAST SERPL-MCNC: 92 MG/DL (ref 65–99)
GLUCOSE UR STRIP-MCNC: NEGATIVE MG/DL
HCT VFR BLD AUTO: 45.3 % (ref 34.8–46.1)
HDLC SERPL-MCNC: 45 MG/DL
HGB BLD-MCNC: 15.1 G/DL (ref 11.5–15.4)
HGB UR QL STRIP.AUTO: NEGATIVE
IMM GRANULOCYTES # BLD AUTO: 0.02 THOUSAND/UL (ref 0–0.2)
IMM GRANULOCYTES NFR BLD AUTO: 0 % (ref 0–2)
KETONES UR STRIP-MCNC: NEGATIVE MG/DL
LDLC SERPL CALC-MCNC: 79 MG/DL (ref 0–100)
LEUKOCYTE ESTERASE UR QL STRIP: NEGATIVE
LYMPHOCYTES # BLD AUTO: 1.38 THOUSANDS/ΜL (ref 0.6–4.47)
LYMPHOCYTES NFR BLD AUTO: 26 % (ref 14–44)
MCH RBC QN AUTO: 32.8 PG (ref 26.8–34.3)
MCHC RBC AUTO-ENTMCNC: 33.3 G/DL (ref 31.4–37.4)
MCV RBC AUTO: 98 FL (ref 82–98)
MONOCYTES # BLD AUTO: 0.49 THOUSAND/ΜL (ref 0.17–1.22)
MONOCYTES NFR BLD AUTO: 9 % (ref 4–12)
NEUTROPHILS # BLD AUTO: 3.14 THOUSANDS/ΜL (ref 1.85–7.62)
NEUTS SEG NFR BLD AUTO: 60 % (ref 43–75)
NITRITE UR QL STRIP: NEGATIVE
NONHDLC SERPL-MCNC: 95 MG/DL
NRBC BLD AUTO-RTO: 0 /100 WBCS
PH UR STRIP.AUTO: 6.5 [PH]
PLATELET # BLD AUTO: 147 THOUSANDS/UL (ref 149–390)
PMV BLD AUTO: 11.6 FL (ref 8.9–12.7)
POTASSIUM SERPL-SCNC: 4.1 MMOL/L (ref 3.5–5.3)
PROT SERPL-MCNC: 7.5 G/DL (ref 6.4–8.2)
PROT UR STRIP-MCNC: NEGATIVE MG/DL
RBC # BLD AUTO: 4.61 MILLION/UL (ref 3.81–5.12)
SODIUM SERPL-SCNC: 139 MMOL/L (ref 136–145)
SP GR UR STRIP.AUTO: 1.03 (ref 1–1.03)
TRIGL SERPL-MCNC: 80 MG/DL
TSH SERPL DL<=0.05 MIU/L-ACNC: 0.51 UIU/ML (ref 0.36–3.74)
UROBILINOGEN UR QL STRIP.AUTO: 1 E.U./DL
WBC # BLD AUTO: 5.29 THOUSAND/UL (ref 4.31–10.16)

## 2020-09-17 PROCEDURE — 81003 URINALYSIS AUTO W/O SCOPE: CPT | Performed by: NURSE PRACTITIONER

## 2020-09-17 PROCEDURE — 85025 COMPLETE CBC W/AUTO DIFF WBC: CPT

## 2020-09-17 PROCEDURE — 80053 COMPREHEN METABOLIC PANEL: CPT

## 2020-09-17 PROCEDURE — 36415 COLL VENOUS BLD VENIPUNCTURE: CPT

## 2020-09-17 PROCEDURE — 84443 ASSAY THYROID STIM HORMONE: CPT

## 2020-09-17 PROCEDURE — 80061 LIPID PANEL: CPT

## 2020-09-22 ENCOUNTER — TELEPHONE (OUTPATIENT)
Dept: FAMILY MEDICINE CLINIC | Facility: CLINIC | Age: 30
End: 2020-09-22

## 2020-09-22 NOTE — TELEPHONE ENCOUNTER
Pt called for results for her bloodwork  The results are in the chart  Can you please review and advise  Thank you

## 2020-09-23 NOTE — TELEPHONE ENCOUNTER
Pt called asking about her lab results  I saw Funmi's note to you and I told the patient that everything was normal  Pt asked what steps Kiya Alatorre would like to take next? I told her that with everything coming back normal she did not recommend anything else  Pt wanted me to see if anything else can or should be done?

## 2020-09-28 ENCOUNTER — OFFICE VISIT (OUTPATIENT)
Dept: FAMILY MEDICINE CLINIC | Facility: CLINIC | Age: 30
End: 2020-09-28
Payer: COMMERCIAL

## 2020-09-28 VITALS
RESPIRATION RATE: 16 BRPM | HEART RATE: 92 BPM | SYSTOLIC BLOOD PRESSURE: 139 MMHG | OXYGEN SATURATION: 98 % | BODY MASS INDEX: 30.37 KG/M2 | DIASTOLIC BLOOD PRESSURE: 86 MMHG | TEMPERATURE: 97.7 F | WEIGHT: 189 LBS | HEIGHT: 66 IN

## 2020-09-28 DIAGNOSIS — Z87.898 HISTORY OF VERTIGO: Primary | ICD-10-CM

## 2020-09-28 PROCEDURE — 4004F PT TOBACCO SCREEN RCVD TLK: CPT | Performed by: FAMILY MEDICINE

## 2020-09-28 PROCEDURE — 99212 OFFICE O/P EST SF 10 MIN: CPT | Performed by: FAMILY MEDICINE

## 2020-09-28 NOTE — PROGRESS NOTES
Assessment/Plan:   She was advised keep a diet consistent with metabolic needs and slowly reduce her calorie intact wo eliminating major food groups  Balance was stressed  She was advised if she feels the "dizzy spells" are reoccurring please let me know of rto  Pt verbal;ized agreement and understanding of the plan of care as outlined during her OV  RTO as needed  Subjective:     Patient ID: oJvany Alaniz is a 34 y o  female  [facility]  She denies any new cases of dizziness which she feels may have been dietary related as she was trying keto diet  Review of Systems   Constitutional: Negative for appetite change and fever  HENT: Negative for sinus pressure and sore throat  Eyes: Negative for pain  Respiratory: Negative for shortness of breath  Cardiovascular: Negative for chest pain  Gastrointestinal: Negative for abdominal pain  Genitourinary: Negative for dysuria  Musculoskeletal: Negative for arthralgias and myalgias  Skin: Negative for color change  Neurological: Negative for light-headedness  Psychiatric/Behavioral: Negative for behavioral problems  Objective:     Physical Exam  Vitals signs and nursing note reviewed  Constitutional:       General: She is not in acute distress  Appearance: She is well-developed  She is not diaphoretic  HENT:      Head: Normocephalic and atraumatic  Right Ear: External ear normal       Left Ear: External ear normal    Eyes:      Pupils: Pupils are equal, round, and reactive to light  Neck:      Musculoskeletal: Normal range of motion and neck supple  Cardiovascular:      Rate and Rhythm: Normal rate and regular rhythm  Pulmonary:      Effort: Pulmonary effort is normal       Breath sounds: Normal breath sounds  Abdominal:      Palpations: Abdomen is soft  Musculoskeletal: Normal range of motion  Skin:     General: Skin is dry     Neurological:      Mental Status: She is alert and oriented to person, place, and time  Psychiatric:         Behavior: Behavior normal          Thought Content:  Thought content normal

## 2020-12-02 ENCOUNTER — TELEPHONE (OUTPATIENT)
Dept: OBGYN CLINIC | Facility: CLINIC | Age: 30
End: 2020-12-02

## 2020-12-03 ENCOUNTER — OFFICE VISIT (OUTPATIENT)
Dept: OBGYN CLINIC | Facility: CLINIC | Age: 30
End: 2020-12-03

## 2020-12-03 VITALS
WEIGHT: 193 LBS | HEART RATE: 70 BPM | HEIGHT: 66 IN | SYSTOLIC BLOOD PRESSURE: 135 MMHG | DIASTOLIC BLOOD PRESSURE: 88 MMHG | BODY MASS INDEX: 31.02 KG/M2

## 2020-12-03 DIAGNOSIS — R10.2 PELVIC PAIN: Primary | ICD-10-CM

## 2020-12-03 LAB
SL AMB  POCT GLUCOSE, UA: NEGATIVE
SL AMB LEUKOCYTE ESTERASE,UA: ABNORMAL
SL AMB POCT BILIRUBIN,UA: NEGATIVE
SL AMB POCT BLOOD,UA: NEGATIVE
SL AMB POCT CLARITY,UA: CLEAR
SL AMB POCT COLOR,UA: YELLOW
SL AMB POCT KETONES,UA: NEGATIVE
SL AMB POCT NITRITE,UA: NEGATIVE
SL AMB POCT PH,UA: 7
SL AMB POCT SPECIFIC GRAVITY,UA: 1.01
SL AMB POCT URINE PROTEIN: NEGATIVE
SL AMB POCT UROBILINOGEN: NEGATIVE

## 2020-12-03 PROCEDURE — 3008F BODY MASS INDEX DOCD: CPT | Performed by: NURSE PRACTITIONER

## 2020-12-03 PROCEDURE — 4004F PT TOBACCO SCREEN RCVD TLK: CPT | Performed by: NURSE PRACTITIONER

## 2020-12-03 PROCEDURE — 81002 URINALYSIS NONAUTO W/O SCOPE: CPT | Performed by: NURSE PRACTITIONER

## 2020-12-03 PROCEDURE — 99213 OFFICE O/P EST LOW 20 MIN: CPT | Performed by: NURSE PRACTITIONER

## 2020-12-15 ENCOUNTER — TELEPHONE (OUTPATIENT)
Dept: OBGYN CLINIC | Facility: CLINIC | Age: 30
End: 2020-12-15

## 2020-12-15 DIAGNOSIS — Z30.011 ENCOUNTER FOR INITIAL PRESCRIPTION OF CONTRACEPTIVE PILLS: Primary | ICD-10-CM

## 2020-12-15 RX ORDER — NORGESTIMATE AND ETHINYL ESTRADIOL 0.25-0.035
1 KIT ORAL DAILY
Qty: 28 TABLET | Refills: 1 | Status: SHIPPED | OUTPATIENT
Start: 2020-12-15 | End: 2021-11-19 | Stop reason: ALTCHOICE

## 2021-04-28 ENCOUNTER — OFFICE VISIT (OUTPATIENT)
Dept: FAMILY MEDICINE CLINIC | Facility: CLINIC | Age: 31
End: 2021-04-28
Payer: COMMERCIAL

## 2021-04-28 VITALS
RESPIRATION RATE: 16 BRPM | DIASTOLIC BLOOD PRESSURE: 76 MMHG | OXYGEN SATURATION: 97 % | HEART RATE: 89 BPM | TEMPERATURE: 99 F | HEIGHT: 66 IN | WEIGHT: 190 LBS | SYSTOLIC BLOOD PRESSURE: 120 MMHG | BODY MASS INDEX: 30.53 KG/M2

## 2021-04-28 DIAGNOSIS — L74.513 HYPERHIDROSIS OF FEET: ICD-10-CM

## 2021-04-28 DIAGNOSIS — R53.83 FATIGUE, UNSPECIFIED TYPE: ICD-10-CM

## 2021-04-28 DIAGNOSIS — F17.200 SMOKER: Primary | ICD-10-CM

## 2021-04-28 PROCEDURE — 4004F PT TOBACCO SCREEN RCVD TLK: CPT | Performed by: NURSE PRACTITIONER

## 2021-04-28 PROCEDURE — 3008F BODY MASS INDEX DOCD: CPT | Performed by: NURSE PRACTITIONER

## 2021-04-28 PROCEDURE — 99214 OFFICE O/P EST MOD 30 MIN: CPT | Performed by: NURSE PRACTITIONER

## 2021-04-28 RX ORDER — PNV 119/IRON FUM/FOLIC ACID 29 MG-1 MG
1 TABLET ORAL DAILY
COMMUNITY

## 2021-04-28 NOTE — PROGRESS NOTES
Assessment/Plan:  Physical assessment demonstrated the patient has extreme hyperhidrosis with maceration of the bottom of her feet bilaterally  Patient was advised that arm a change in footwear may be necessary will also get an of patient appointment with the Dermatology to determine if there is on medication or procedures that can be done to help relieve of this complaint  She also be noted patient is actively trying to get pregnant has been trying for over a year and is past due for routine labs to test her thyroid will facilitate that for her and contact her with results when available  Problem List Items Addressed This Visit     None      Visit Diagnoses     Smoker    -  Primary    Hyperhidrosis of feet        Relevant Orders    Ambulatory referral to Dermatology    Fatigue, unspecified type        Relevant Orders    Ambulatory referral to Dermatology    CBC and differential    Comprehensive metabolic panel    Lipid panel    UA w Reflex to Microscopic w Reflex to Culture    TSH, 3rd generation with Free T4 reflex            Subjective:      Patient ID: Jewel Ospina is a 27 y o  female  Patient presents today with complaint of discolorations of the bottoms of her feet  It should also be noted patient has extremely wet feet sweaty feet and she has required wear safety issues at all times during work hours  She has never had this problem before on and she has tried multiple a different things from powders to change in her socks eye with no relief  She denies any nausea vomiting diarrhea chest pains or shortness of breath  This becomes that big issue for person eye she has also tried treating for athlete's foot but with no relief        The following portions of the patient's history were reviewed and updated as appropriate: allergies, current medications, past family history, past medical history, past social history, past surgical history and problem list     Review of Systems   Constitutional: Negative for appetite change and fever  HENT: Negative for sinus pressure and sore throat  Eyes: Negative for pain  Respiratory: Negative for shortness of breath  Cardiovascular: Negative for chest pain  Gastrointestinal: Negative for abdominal pain  Genitourinary: Negative for dysuria  Musculoskeletal: Negative for arthralgias and myalgias  Skin: Negative for color change  Neurological: Negative for light-headedness  Psychiatric/Behavioral: Negative for behavioral problems  Objective:      /76 (BP Location: Left arm, Patient Position: Sitting, Cuff Size: Standard)   Pulse 89   Temp 99 °F (37 2 °C) (Tympanic)   Resp 16   Ht 5' 6" (1 676 m)   Wt 86 2 kg (190 lb)   LMP 04/10/2021   SpO2 97%   BMI 30 67 kg/m²          Physical Exam  Vitals signs and nursing note reviewed  Constitutional:       General: She is not in acute distress  Appearance: She is well-developed  She is not diaphoretic  HENT:      Head: Normocephalic and atraumatic  Right Ear: External ear normal       Left Ear: External ear normal    Eyes:      Pupils: Pupils are equal, round, and reactive to light  Neck:      Musculoskeletal: Normal range of motion and neck supple  Cardiovascular:      Rate and Rhythm: Normal rate and regular rhythm  Pulses: Normal pulses  Heart sounds: Normal heart sounds  Pulmonary:      Effort: Pulmonary effort is normal       Breath sounds: Normal breath sounds  Abdominal:      Palpations: Abdomen is soft  Musculoskeletal: Normal range of motion  Skin:     General: Skin is dry  Neurological:      Mental Status: She is alert and oriented to person, place, and time  Psychiatric:         Behavior: Behavior normal          Thought Content:  Thought content normal

## 2021-05-17 ENCOUNTER — CONSULT (OUTPATIENT)
Dept: DERMATOLOGY | Facility: CLINIC | Age: 31
End: 2021-05-17
Payer: COMMERCIAL

## 2021-05-17 VITALS — HEIGHT: 66 IN | BODY MASS INDEX: 31.18 KG/M2 | TEMPERATURE: 98.6 F | WEIGHT: 194 LBS

## 2021-05-17 DIAGNOSIS — L85.3 XEROSIS OF SKIN: ICD-10-CM

## 2021-05-17 PROCEDURE — 3008F BODY MASS INDEX DOCD: CPT | Performed by: DERMATOLOGY

## 2021-05-17 PROCEDURE — 4004F PT TOBACCO SCREEN RCVD TLK: CPT | Performed by: DERMATOLOGY

## 2021-05-17 PROCEDURE — 99203 OFFICE O/P NEW LOW 30 MIN: CPT | Performed by: DERMATOLOGY

## 2021-05-17 RX ORDER — AMOXICILLIN 500 MG/1
CAPSULE ORAL
COMMUNITY
Start: 2021-05-07 | End: 2021-12-02

## 2021-05-17 NOTE — PROGRESS NOTES
[address] [facility] Note     Patient Name: Jarek Norman  Encounter Date: 05/17/2021     Have you been cared for by a [facility] Dermatologist in the last 3 years and, if so, which one? No    · Have you traveled outside of the [address] [place] [address] in the past 3 months or outside of the [facility] area in the last 2 weeks? No     May we call your Preferred Phone number to discuss your specific medical information? Yes     May we leave a detailed message that includes your specific medical information? Yes      Today's Chief Concerns:   Concern #1:  Sole issue     Past Medical History:  Have you personally ever had or currently have any of the following? · Skin cancer (such as Melanoma, Basal Cell Carcinoma, Squamous Cell Carcinoma? (If Yes, please provide more detail)- No  · Eczema: YES  · Psoriasis: No  · HIV/AIDS: No  · Hepatitis B or C: No  · Tuberculosis: No  · Systemic Immunosuppression such as Diabetes, Biologic or Immunotherapy, Chemotherapy, Organ Transplantation, Bone Marrow Transplantation (If YES, please provide more detail): No  · Radiation Treatment (If YES, please provide more detail): No  · Any other major medical conditions/concerns? (If Yes, which types)- No    Social History:     What is/was your primary occupation?  What are your hobbies/past-times? Family History:  Have any of your "first degree relatives" (parent, brother, sister, or child) had any of the following       · Skin cancer such as Melanoma or Merkel Cell Carcinoma or Pancreatic Cancer? No  · Eczema, Asthma, Hay Fever or Seasonal Allergies: No  · Psoriasis or Psoriatic Arthritis: No  · Do any other medical conditions seem to run in your family? If Yes, what condition and which relatives?   No    Current Medications:         Current Outpatient Medications:     amoxicillin (AMOXIL) 500 mg capsule, , Disp: , Rfl:     multivitamin (THERAGRAN) TABS, Take 1 tablet by mouth daily, Disp: , Rfl:     Prenatal Vit-DSS-Fe Fum-FA (Prenatal 19) tablet, Take 1 tablet by mouth daily, Disp: , Rfl:     norgestimate-ethinyl estradiol (ORTHO-CYCLEN) 0 25-35 MG-MCG per tablet, Take 1 tablet by mouth daily for 28 days, Disp: 28 tablet, Rfl: 1      Review of Systems:  Have you recently had or currently have any of the following? If YES, what are you doing for the problem? · Fever, chills or unintended weight loss: No  · Sudden loss or change in your vision: No  · Nausea, vomiting or blood in your stool: No  · Painful or swollen joints: No  · Wheezing or cough: No  · Changing mole or non-healing wound: No  · Nosebleeds: No  · Excessive sweating: No  · Easy or prolonged bleeding? No  · Over the last 2 weeks, how often have you been bothered by the following problems? · Taking little interest or pleasure in doing things: 1 - Not at All  · Feeling down, depressed, or hopeless: 1 - Not at All  · Rapid heartbeat with epinephrine:  No    · FEMALES ONLY:    · Are you pregnant or planning to become pregnant? YES  · Are you currently or planning to be nursing or breast feeding? Unsure     · Any known allergies? Allergies   Allergen Reactions    Medical Tape Rash and Blisters   ·       Physical Exam:     Was a chaperone (Derm Clinical Assistant) present throughout the entire Physical Exam? Yes     Did the Dermatology Team specifically  the patient on the importance of a Full Skin Exam to be sure that nothing is missed clinically?  Yes}  o Did the patient ultimately request or accept a Full Skin Exam?  NO  o Did the patient specifically refuse to have the areas "under-the-bra" examined by the Dermatologist? Ok Pack  o Did the patient specifically refuse to have the areas "under-the-underwear" examined by the Dermatologist? YES    CONSTITUTIONAL:   Vitals:    05/17/21 1033   Temp: 98 6 °F (37 °C)   TempSrc: Tympanic   Weight: 88 kg (194 lb)   Height: 5' 6" (1 676 m)         PSYCH: Normal mood and affect  EYES: Normal conjunctiva  ENT: Normal lips and oral mucosa  CARDIOVASCULAR: No edema  RESPIRATORY: Normal respirations  HEME/LYMPH/IMMUNO:  No regional lymphadenopathy except as noted below in "ASSESSMENT AND PLAN BY DIAGNOSIS"    SKIN:  FULL ORGAN SYSTEM EXAM   Hair, Scalp, Ears, Face Normal except as noted below in Assessment   Neck, Cervical Chain Nodes    Right Arm/Hand/Fingers    Left Arm/Hand/Fingers    Chest/Breasts/Axillae    Abdomen, Umbilicus    Back/Spine    Groin/Genitalia/Buttocks    Right Leg, Foot, Toes Normal except as noted below in Assessment   Left Leg, Foot, Toes Normal except as noted below in Assessment        Assessment and Plan by Diagnosis:    History of Present Condition:     Duration:  How long has this been an issue for you?    o  month    Location Affected:  Where on the body is this affecting you?    o  bottom of left and right feet    Quality:  Is there any bleeding, pain, itch, burning/irritation, or redness associated with the skin lesion? o  redness    Severity:  Describe any bleeding, pain, itch, burning/irritation, or redness on a scale of 1 to 10 (with 10 being the worst)  o  6   Timing:  Does this condition seem to be there pretty constantly or do you notice it more at specific times throughout the day? o  constant    Context:  Have you ever noticed that this condition seems to be associated with specific activities you do?    o  denies    Modifying Factors:    o Anything that seems to make the condition worse?    -  denies   o What have you tried to do to make the condition better?    -  foot powder, scrubbing feet, and changing soaks    Associated Signs and Symptoms:  Does this skin lesion seem to be associated with any of the following:  o  SL AMB DERM SIGNS AND SYMPTOMS: Redness       1  XEROSIS ("DRY SKIN")    Physical Exam:   Anatomic Location Affected:  Plantar feet    Morphological Description:   Thick yellow plaques, photo with well demarcated pink patch, less noticeable in person    Pertinent Positives:   Pertinent Negatives:      Assessment and Plan:  Based on a thorough discussion of this condition and the management approach to it (including a comprehensive discussion of the known risks, side effects and potential benefits of treatment), the patient (family) agrees to implement the following specific plan:     Try over the counter urea 40 % cream ,apply topically daily at night    If rash returns, contact us    Dry skin refers to skin that feels dry to touch  Dry skin has a dull surface with a rough, scaly quality  The skin is less pliable and cracked  When dryness is severe, the skin may become inflamed and fissured  Although any body site can be dry, dry skin tends to affect the shins more than any other site  Dry skin is lacking moisture in the outer horny cell layer (stratum corneum) and this results in cracks in the skin surface  Dry skin is also called xerosis, xeroderma or asteatosis (lack of fat)  It can affect males and females of all ages  There is some racial variability in water and lipid content of the skin   Dry skin that starts in early childhood may be one of about 20 types of ichthyosis (fish-scale skin)  There is often a family history of dry skin   Dry skin is commonly seen in people with atopic dermatitis   Nearly everyone > 60 years has dry skin  Dry skin that begins later may be seen in people with certain diseases and conditions   Postmenopausal women   Hypothyroidism   Chronic renal disease    Malnutrition and weight loss    Subclinical dermatitis    Treatment with certain drugs such as oral retinoids, diuretics and epidermal growth factor receptor inhibitors    People exposed to a dry environment may experience dry skin     Low humidity: in desert climates or cool, windy conditions    Excessive air conditioning    Direct heat from a fire or fan heater    Excessive bathing    Contact with soap, detergents and solvents    Inappropriate topical agents such as alcohol    Frictional irritation from rough clothing or abrasives    Dry skin is due to abnormalities in the integrity of the barrier function of the stratum corneum, which is made up of corneocytes   There is an overall reduction in the lipids in the stratum corneum   Ratio of ceramides, cholesterol and free fatty acids may be normal or altered   There may be a reduction in the proliferation of keratinocytes   Keratinocyte subtypes change in dry skin with a decrease in keratins K1, K10 and increase in K5, K14     Involucrin (a protein) may be expressed early, increasing cell stiffness   The result is retention of corneocytes and reduced water-holding capacity  The inherited forms of ichthyosis are due to loss of function mutations in various genes (listed in parentheses below)  The clinical features of ichthyosis depend on the specific type of ichthyosis:   Ichthyosis vulgaris (FLG)   Recessive X-linked ichthyosis (STS)    Autosomal recessive congenital ichthyosis (ABCA12, TGM1, ALOXE3)    Keratinopathic ichthyoses (KRT1, KRT10, KRT2)    Acquired ichthyosis may be due to:   Metabolic factors: thyroid deficiency    Illness: lymphoma, internal malignancy, sarcoidosis, HIV infection    Drugs: nicotinic acid, kava, protein kinase inhibitors (eg EGFR inhibitors), hydroxyurea  Complications of dry skin:  Dry areas of skin may become itchy, indicating a form of eczema/dermatitis has developed   Atopic eczema -- especially in people with ichthyosis vulgaris    Eczema craquelé -- especially in elderly people  Also called asteatotic eczema    A dry form of nummular dermatitis/discoid eczema -- especially in people that wash their skin excessively  When the dry skin of an elderly person is itchy without a visible rash, it is sometimes called winter itch, 7th age itch, senile pruritus or chronic pruritus of the elderly      Other complications of dry skin may include:   Skin infection when bacteria or viruses penetrate a break in the skin surface    Overheating, especially in some forms of ichthyosis    Food allergy, eg, to peanuts, has been associated with filaggrin mutations    Contact allergy, eg, to nickel, has also been correlated with barrier function defects  How is the type of dry skin diagnosed? The type of dry skin is diagnosed by careful history and examination  In children:   Family history    Age of onset    Appearance at birth, if known    Distribution of dry skin    Other features, eg eczema, abnormal nails, hair, dentition, sight, hearing  In adults:   Medical history    Medications and topical preparations    Bathing frequency and use of soap    Evaluation of environmental factors that may contribute to dry skin  What is the treatment for dry skin? The mainstay of treatment of dry skin and ichthyosis is moisturisers/emollients  They should be applied liberally and often enough to:   Reduce itch    Improve the barrier function    Prevent entry of irritants, bacteria    Reduce transepidermal water loss  When considering which emollient is most suitable, consider:   Severity of the dryness    Tolerance    Personal preference    Cost and availability  Emollients generally work best if applied to damp skin, if pH is below 7 (acidic), and if containing humectants such as urea or propylene glycol  Additional treatments include:   Topical steroid if itchy or there is dermatitis -- choose an emollient base    Topical calcineurin inhibitors if topical steroids are unsuitable  How can dry skin be prevented? Eliminate aggravating factors:   Reduce the frequency of bathing   A humidifier in winter and air conditioner in summer    Compare having a short shower with a prolonged soak in a bath   Use lukewarm, not hot, water      Replace standard soap with a substitute such as a synthetic detergent cleanser, water-miscible emollient, bath oil, anti-pruritic tar oil, colloidal oatmeal etc     Apply an emollient liberally and often, particularly shortly after bathing, and when itchy  The drier the skin, the thicker this should be, especially on the hands  What is the outlook for dry skin? A tendency to dry skin may persist life-long, or it may improve once contributing factors are controlled      Scribe Attestation    I,:  Namrata Knight MA am acting as a scribe while in the presence of the attending physician :       I,:  Jhonatan Echeverria MD personally performed the services described in this documentation    as scribed in my presence :

## 2021-05-17 NOTE — PATIENT INSTRUCTIONS
Assessment and Plan:  Based on a thorough discussion of this condition and the management approach to it (including a comprehensive discussion of the known risks, side effects and potential benefits of treatment), the patient (family) agrees to implement the following specific plan:     Try over the counter urea 40 % cream ,apply topically daily at night     Dry skin refers to skin that feels dry to touch  Dry skin has a dull surface with a rough, scaly quality  The skin is less pliable and cracked  When dryness is severe, the skin may become inflamed and fissured  Although any body site can be dry, dry skin tends to affect the shins more than any other site  Dry skin is lacking moisture in the outer horny cell layer (stratum corneum) and this results in cracks in the skin surface  Dry skin is also called xerosis, xeroderma or asteatosis (lack of fat)  It can affect males and females of all ages  There is some racial variability in water and lipid content of the skin   Dry skin that starts in early childhood may be one of about 20 types of ichthyosis (fish-scale skin)  There is often a family history of dry skin   Dry skin is commonly seen in people with atopic dermatitis   Nearly everyone > 60 years has dry skin  Dry skin that begins later may be seen in people with certain diseases and conditions   Postmenopausal women   Hypothyroidism   Chronic renal disease    Malnutrition and weight loss    Subclinical dermatitis    Treatment with certain drugs such as oral retinoids, diuretics and epidermal growth factor receptor inhibitors    People exposed to a dry environment may experience dry skin     Low humidity: in desert climates or cool, windy conditions    Excessive air conditioning    Direct heat from a fire or fan heater    Excessive bathing    Contact with soap, detergents and solvents    Inappropriate topical agents such as alcohol    Frictional irritation from rough clothing or abrasives    Dry skin is due to abnormalities in the integrity of the barrier function of the stratum corneum, which is made up of corneocytes   There is an overall reduction in the lipids in the stratum corneum   Ratio of ceramides, cholesterol and free fatty acids may be normal or altered   There may be a reduction in the proliferation of keratinocytes   Keratinocyte subtypes change in dry skin with a decrease in keratins K1, K10 and increase in K5, K14     Involucrin (a protein) may be expressed early, increasing cell stiffness   The result is retention of corneocytes and reduced water-holding capacity  The inherited forms of ichthyosis are due to loss of function mutations in various genes (listed in parentheses below)  The clinical features of ichthyosis depend on the specific type of ichthyosis:   Ichthyosis vulgaris (FLG)   Recessive X-linked ichthyosis (STS)    Autosomal recessive congenital ichthyosis (ABCA12, TGM1, ALOXE3)    Keratinopathic ichthyoses (KRT1, KRT10, KRT2)    Acquired ichthyosis may be due to:   Metabolic factors: thyroid deficiency    Illness: lymphoma, internal malignancy, sarcoidosis, HIV infection    Drugs: nicotinic acid, kava, protein kinase inhibitors (eg EGFR inhibitors), hydroxyurea  Complications of dry skin:  Dry areas of skin may become itchy, indicating a form of eczema/dermatitis has developed   Atopic eczema -- especially in people with ichthyosis vulgaris    Eczema craquelé -- especially in elderly people  Also called asteatotic eczema    A dry form of nummular dermatitis/discoid eczema -- especially in people that wash their skin excessively  When the dry skin of an elderly person is itchy without a visible rash, it is sometimes called winter itch, 7th age itch, senile pruritus or chronic pruritus of the elderly      Other complications of dry skin may include:   Skin infection when bacteria or viruses penetrate a break in the skin surface  Overheating, especially in some forms of ichthyosis    Food allergy, eg, to peanuts, has been associated with filaggrin mutations    Contact allergy, eg, to nickel, has also been correlated with barrier function defects  How is the type of dry skin diagnosed? The type of dry skin is diagnosed by careful history and examination  In children:   Family history    Age of onset    Appearance at birth, if known    Distribution of dry skin    Other features, eg eczema, abnormal nails, hair, dentition, sight, hearing  In adults:   Medical history    Medications and topical preparations    Bathing frequency and use of soap    Evaluation of environmental factors that may contribute to dry skin  What is the treatment for dry skin? The mainstay of treatment of dry skin and ichthyosis is moisturisers/emollients  They should be applied liberally and often enough to:   Reduce itch    Improve the barrier function    Prevent entry of irritants, bacteria    Reduce transepidermal water loss  When considering which emollient is most suitable, consider:   Severity of the dryness    Tolerance    Personal preference    Cost and availability  Emollients generally work best if applied to damp skin, if pH is below 7 (acidic), and if containing humectants such as urea or propylene glycol  Additional treatments include:   Topical steroid if itchy or there is dermatitis -- choose an emollient base    Topical calcineurin inhibitors if topical steroids are unsuitable  How can dry skin be prevented? Eliminate aggravating factors:   Reduce the frequency of bathing   A humidifier in winter and air conditioner in summer    Compare having a short shower with a prolonged soak in a bath   Use lukewarm, not hot, water      Replace standard soap with a substitute such as a synthetic detergent cleanser, water-miscible emollient, bath oil, anti-pruritic tar oil, colloidal oatmeal etc     Apply an emollient liberally and often, particularly shortly after bathing, and when itchy  The drier the skin, the thicker this should be, especially on the hands  What is the outlook for dry skin? A tendency to dry skin may persist life-long, or it may improve once contributing factors are controlled

## 2021-07-07 NOTE — PROGRESS NOTES
ASSESSMENT & PLAN: Jayla Huitron is a 27 y o  Talita Harley with normal gynecologic exam     1   Routine well woman exam done today  2  Pap and HPV:  The patient's last pap was  04/01/2019  It was normal     Pap and cotesting was not done today  Current ASCCP Guidelines reviewed  Due 04/01/2022  3  The following were reviewed in today's visit: breast self exam, STD testing, adequate intake of calcium and vitamin D, exercise, healthy diet and tobacco cessation  4  Patient has been vaccinated with Gardasil  5   Desires pregnancy-  Taking prenatal vitamin    BMI Counseling: Body mass index is 31 47 kg/m²  The BMI is above normal  Nutrition recommendations include 3-5 servings of fruits/vegetables daily, moderation in carbohydrate intake and increasing intake of lean protein  Exercise recommendations include exercising 3-5 times per week  CC:  Annual Gynecologic Examination    HPI: Jayla Huitron is a 27 y o  Talita Harley who presents for annual gynecologic examination  Her last Pap was 04/01/2019 and was negative  Due for Pap and HPV 04/01/2022  History of bilateral breast reduction 03/12/2019 She has the following concerns:  Has been trying to get pregnant for 13 months  Menses are monthly and regular  Pt reports she knows she is ovulating  Her partner is to get a semen analysis, has not fathered children  She will  check with her insurance for coverage for infertility  Patient is to  Complete outstanding labs include TSH, CMP CBC and lipid panel  Health Maintenance:    She wears her seatbelt routinely  She does perform regular monthly self breast exams  She feels safe at home       Past Medical History:   Diagnosis Date    Depression     Kidney stone     Seasonal allergies        Past Surgical History:   Procedure Laterality Date    IR IMAGE GUIDED ASPIRATION / DRAINAGE  3/22/2019    DE BREAST REDUCTION Bilateral 3/12/2019    Procedure: REDUCTION MAMMOPLASTY BREAST;  Surgeon: Margo Crooks MD; [facility];  Service: [address]    as a child to remove abcess; cyst removal    WISDOM TOOTH EXTRACTION      3/4       Past OB/Gyn History:  OB History        1    Para   1    Term   1            AB        Living   1       SAB        TAB        Ectopic        Multiple        Live Births   1               Pt does not have menstrual issues  Monthly times 5-6 days  History of sexually transmitted infection: No   History of abnormal pap smears: Yes  When she was 23to 21years old  Patient is currently sexually active  heterosexual   The current method of family planning is none  Family History   Problem Relation Age of Onset    Hypertension Mother     No Known Problems Father     Cervical cancer Maternal Grandmother     Diabetes Maternal Grandmother     Kidney disease Maternal Grandmother     Depression Sister     No Known Problems Daughter     Depression Maternal Grandfather        Social History:  Social History     Socioeconomic History    Marital status: Single     Spouse name: Not on file    Number of children: Not on file    Years of education: 15    Highest education level: Not on file   Occupational History    Occupation: [employer]   Tobacco Use    Smoking status: Current Every Day Smoker     Packs/day: 0 50     Years: 11 00     Pack years: 5 50     Types: Cigarettes    Smokeless tobacco: Never Used    Tobacco comment: 1 PPD; smoked since age 15- per Argenis Gutting   Vaping Use    Vaping Use: Never used   Substance and Sexual Activity    Alcohol use: Yes     Comment: socially    Drug use: No    Sexual activity: Yes     Partners: Male     Birth control/protection: None   Other Topics Concern    Not on file   Social History Narrative    · Most recent tobacco use screenin2019        · Sexual orientation:   Heterosexual      · Exercise level:    Moderate      · Diet:   Regular      · General stress level:   High        · Alcohol intake:   Occasional      · Caffeine intake: Moderate       · Guns present in home:   No      · Seat belts used routinely:   Yes      · Sunscreen used routinely:   No      · Smoke alarm in home: Yes      · Advance directive:   No      Social Determinants of Health     Financial Resource Strain:     Difficulty of Paying Living Expenses:    Food Insecurity:     Worried About Running Out of Food in the Last Year:     [address] in the Last Year:    Transportation Needs:     Lack of Transportation (Medical):  Lack of Transportation (Non-Medical):    Physical Activity:     Days of Exercise per Week:     Minutes of Exercise per Session:    Stress:     Feeling of Stress :    Social Connections:     Frequency of Communication with Friends and Family:     Frequency of Social Gatherings with Friends and Family:     Attends Nondenominational Services:     Active Member of Clubs or Organizations:     Attends Club or Organization Meetings:     Marital Status:    Intimate Partner Violence:     Fear of Current or Ex-Partner:     Emotionally Abused:     Physically Abused:     Sexually Abused:      Presently lives with  family  Patient is single  Patient is currently employed     Allergies   Allergen Reactions    Medical Tape Rash and Blisters         Current Outpatient Medications:     amoxicillin (AMOXIL) 500 mg capsule, , Disp: , Rfl:     multivitamin (THERAGRAN) TABS, Take 1 tablet by mouth daily, Disp: , Rfl:     norgestimate-ethinyl estradiol (ORTHO-CYCLEN) 0 25-35 MG-MCG per tablet, Take 1 tablet by mouth daily for 28 days, Disp: 28 tablet, Rfl: 1    Prenatal Vit-DSS-Fe Fum-FA (Prenatal 19) tablet, Take 1 tablet by mouth daily, Disp: , Rfl:       Review of Systems  Constitutional :no fever, feels well, no tiredness, no recent weight gain or loss  ENT: no ear ache, no loss of hearing, no nosebleeds or nasal discharge, no sore throat or hoarseness    Cardiovascular: no complaints of slow or fast heart beat, no chest pain, no palpitations, no leg claudication or lower extremity edema  Respiratory: no complaints of shortness of shortness of breath, no MARTINEZ  Breasts:no complaints of breast pain, breast lump, or nipple discharge  Gastrointestinal: no complaints of abdominal pain, constipation, nausea, vomiting, or diarrhea or bloody stools  Genitourinary : no complaints of dysuria, incontinence, pelvic pain, no dysmenorrhea, vaginal discharge or abnormal vaginal bleeding and as noted in HPI  Musculoskeletal: no complaints of arthralgia, no myalgia, no joint swelling or stiffness, no limb pain or swelling  Integumentary: no complaints of skin rash or lesion, itching or dry skin  Neurological: no complaints of headache, no confusion, no numbness or tingling, no dizziness or fainting    Objective      There were no vitals taken for this visit  General:   appears stated age, cooperative, alert normal mood and affect   Neck: normal, supple,trachea midline, no masses   Heart: regular rate and rhythm, S1, S2 normal, no murmur, click, rub or gallop   Lungs: clear to auscultation bilaterally   Breasts: No nipple discharge or bleeding, No axillary or supraclavicular adenopathy, Normal to palpation without dominant masses, Taught monthly breast self examination,  Scars present from bilateral breast reduction, greater on the right under breast   Abdomen: soft, non-tender, without masses or organomegaly   Vulva: normal female genitalia, Bartholin's, Urethra, Greenhorn normal   Vagina: normal vagina, no discharge, exudate, lesion, or erythema   Urethra: normal   Cervix: Normal, no discharge  Nontender  Uterus: normal size, contour, position, consistency, mobility, non-tender and anteverted   Adnexa: normal adnexa and no mass, fullness, tenderness   Lymphatic palpation of lymph nodes in neck, axilla, groin and/or other locations: no lymphadenopathy or masses noted   Skin normal skin turgor and no rashes     Psychiatric orientation to person, place, and time: normal  mood and affect: normal

## 2021-07-08 ENCOUNTER — ANNUAL EXAM (OUTPATIENT)
Dept: OBGYN CLINIC | Facility: CLINIC | Age: 31
End: 2021-07-08

## 2021-07-08 VITALS
HEART RATE: 60 BPM | BODY MASS INDEX: 31.34 KG/M2 | HEIGHT: 66 IN | WEIGHT: 195 LBS | SYSTOLIC BLOOD PRESSURE: 143 MMHG | DIASTOLIC BLOOD PRESSURE: 93 MMHG

## 2021-07-08 DIAGNOSIS — Z01.419 ENCOUNTER FOR GYNECOLOGICAL EXAMINATION WITHOUT ABNORMAL FINDING: Primary | ICD-10-CM

## 2021-07-08 PROCEDURE — 99395 PREV VISIT EST AGE 18-39: CPT | Performed by: NURSE PRACTITIONER

## 2021-07-08 PROCEDURE — 3008F BODY MASS INDEX DOCD: CPT | Performed by: NURSE PRACTITIONER

## 2021-07-08 PROCEDURE — 3725F SCREEN DEPRESSION PERFORMED: CPT | Performed by: NURSE PRACTITIONER

## 2021-07-08 PROCEDURE — 3008F BODY MASS INDEX DOCD: CPT | Performed by: DERMATOLOGY

## 2021-07-08 PROCEDURE — 4004F PT TOBACCO SCREEN RCVD TLK: CPT | Performed by: NURSE PRACTITIONER

## 2021-09-14 ENCOUNTER — HOSPITAL ENCOUNTER (OUTPATIENT)
Dept: ULTRASOUND IMAGING | Facility: HOSPITAL | Age: 31
Discharge: HOME | End: 2021-09-14
Payer: COMMERCIAL

## 2021-09-14 DIAGNOSIS — R10.2 PELVIC PAIN: ICD-10-CM

## 2021-09-14 PROCEDURE — 76830 TRANSVAGINAL US NON-OB: CPT

## 2021-09-14 PROCEDURE — 76856 US EXAM PELVIC COMPLETE: CPT

## 2021-10-18 ENCOUNTER — TELEPHONE (OUTPATIENT)
Dept: OBGYN CLINIC | Facility: CLINIC | Age: 31
End: 2021-10-18

## 2021-10-28 ENCOUNTER — ULTRASOUND (OUTPATIENT)
Dept: OBGYN CLINIC | Facility: CLINIC | Age: 31
End: 2021-10-28

## 2021-10-28 VITALS
DIASTOLIC BLOOD PRESSURE: 94 MMHG | WEIGHT: 198 LBS | SYSTOLIC BLOOD PRESSURE: 135 MMHG | HEIGHT: 66 IN | HEART RATE: 94 BPM | BODY MASS INDEX: 31.82 KG/M2

## 2021-10-28 DIAGNOSIS — Z3A.09 9 WEEKS GESTATION OF PREGNANCY: Primary | ICD-10-CM

## 2021-10-28 PROCEDURE — 4004F PT TOBACCO SCREEN RCVD TLK: CPT | Performed by: OBSTETRICS & GYNECOLOGY

## 2021-10-28 PROCEDURE — 0502F SUBSEQUENT PRENATAL CARE: CPT | Performed by: OBSTETRICS & GYNECOLOGY

## 2021-10-28 PROCEDURE — 3008F BODY MASS INDEX DOCD: CPT | Performed by: OBSTETRICS & GYNECOLOGY

## 2021-10-28 PROCEDURE — 99213 OFFICE O/P EST LOW 20 MIN: CPT | Performed by: OBSTETRICS & GYNECOLOGY

## 2021-10-28 PROCEDURE — 0500F INITIAL PRENATAL CARE VISIT: CPT | Performed by: OBSTETRICS & GYNECOLOGY

## 2021-10-30 ENCOUNTER — HOSPITAL ENCOUNTER (EMERGENCY)
Facility: HOSPITAL | Age: 31
Discharge: HOME | End: 2021-10-30
Attending: EMERGENCY MEDICINE | Admitting: EMERGENCY MEDICINE
Payer: COMMERCIAL

## 2021-10-30 VITALS
RESPIRATION RATE: 16 BRPM | WEIGHT: 198 LBS | HEART RATE: 78 BPM | TEMPERATURE: 99 F | DIASTOLIC BLOOD PRESSURE: 63 MMHG | BODY MASS INDEX: 31.82 KG/M2 | HEIGHT: 66 IN | SYSTOLIC BLOOD PRESSURE: 108 MMHG | OXYGEN SATURATION: 99 %

## 2021-10-30 DIAGNOSIS — O20.9 VAGINAL BLEEDING IN PREGNANCY, FIRST TRIMESTER: Primary | ICD-10-CM

## 2021-10-30 LAB
ABO GROUP BLD: NORMAL
RH BLD: POSITIVE

## 2021-10-30 PROCEDURE — 99284 EMERGENCY DEPT VISIT MOD MDM: CPT | Performed by: EMERGENCY MEDICINE

## 2021-10-30 PROCEDURE — 86900 BLOOD TYPING SEROLOGIC ABO: CPT | Performed by: EMERGENCY MEDICINE

## 2021-10-30 PROCEDURE — 86901 BLOOD TYPING SEROLOGIC RH(D): CPT | Performed by: EMERGENCY MEDICINE

## 2021-10-30 PROCEDURE — 99284 EMERGENCY DEPT VISIT MOD MDM: CPT

## 2021-10-30 PROCEDURE — 76815 OB US LIMITED FETUS(S): CPT | Performed by: EMERGENCY MEDICINE

## 2021-10-30 PROCEDURE — 36415 COLL VENOUS BLD VENIPUNCTURE: CPT | Performed by: EMERGENCY MEDICINE

## 2021-11-02 ENCOUNTER — ROUTINE PRENATAL (OUTPATIENT)
Dept: OBGYN CLINIC | Facility: CLINIC | Age: 31
End: 2021-11-02

## 2021-11-02 VITALS
BODY MASS INDEX: 31.98 KG/M2 | DIASTOLIC BLOOD PRESSURE: 80 MMHG | HEART RATE: 83 BPM | WEIGHT: 199 LBS | SYSTOLIC BLOOD PRESSURE: 124 MMHG | HEIGHT: 66 IN

## 2021-11-02 DIAGNOSIS — O20.9 BLEEDING IN EARLY PREGNANCY: Primary | ICD-10-CM

## 2021-11-02 PROCEDURE — 4004F PT TOBACCO SCREEN RCVD TLK: CPT | Performed by: OBSTETRICS & GYNECOLOGY

## 2021-11-02 PROCEDURE — 99213 OFFICE O/P EST LOW 20 MIN: CPT | Performed by: OBSTETRICS & GYNECOLOGY

## 2021-11-02 RX ORDER — FLUOXETINE HYDROCHLORIDE 20 MG/1
CAPSULE ORAL
COMMUNITY
Start: 2021-10-17

## 2021-11-11 ENCOUNTER — INITIAL PRENATAL (OUTPATIENT)
Dept: OBGYN CLINIC | Facility: CLINIC | Age: 31
End: 2021-11-11

## 2021-11-11 VITALS — RESPIRATION RATE: 16 BRPM | BODY MASS INDEX: 32.14 KG/M2 | HEIGHT: 66 IN | WEIGHT: 200 LBS

## 2021-11-11 DIAGNOSIS — Z34.91 PRENATAL CARE IN FIRST TRIMESTER: Primary | ICD-10-CM

## 2021-11-11 PROCEDURE — OBC

## 2021-11-18 ENCOUNTER — APPOINTMENT (OUTPATIENT)
Dept: LAB | Facility: MEDICAL CENTER | Age: 31
End: 2021-11-18
Payer: COMMERCIAL

## 2021-11-18 DIAGNOSIS — Z34.91 PRENATAL CARE IN FIRST TRIMESTER: ICD-10-CM

## 2021-11-18 DIAGNOSIS — R53.83 FATIGUE, UNSPECIFIED TYPE: ICD-10-CM

## 2021-11-18 LAB
ABO GROUP BLD: NORMAL
ALBUMIN SERPL BCP-MCNC: 3.5 G/DL (ref 3.5–5)
ALP SERPL-CCNC: 47 U/L (ref 46–116)
ALT SERPL W P-5'-P-CCNC: 19 U/L (ref 12–78)
ANION GAP SERPL CALCULATED.3IONS-SCNC: 5 MMOL/L (ref 4–13)
AST SERPL W P-5'-P-CCNC: 14 U/L (ref 5–45)
BACTERIA UR QL AUTO: NORMAL /HPF
BASOPHILS # BLD AUTO: 0.02 THOUSANDS/ΜL (ref 0–0.1)
BASOPHILS NFR BLD AUTO: 0 % (ref 0–1)
BILIRUB SERPL-MCNC: 0.41 MG/DL (ref 0.2–1)
BILIRUB UR QL STRIP: NEGATIVE
BLD GP AB SCN SERPL QL: NEGATIVE
BUN SERPL-MCNC: 6 MG/DL (ref 5–25)
CALCIUM SERPL-MCNC: 8.8 MG/DL (ref 8.3–10.1)
CHLORIDE SERPL-SCNC: 107 MMOL/L (ref 100–108)
CHOLEST SERPL-MCNC: 150 MG/DL (ref 50–200)
CLARITY UR: ABNORMAL
CO2 SERPL-SCNC: 24 MMOL/L (ref 21–32)
COLOR UR: YELLOW
CREAT SERPL-MCNC: 0.47 MG/DL (ref 0.6–1.3)
EOSINOPHIL # BLD AUTO: 0.04 THOUSAND/ΜL (ref 0–0.61)
EOSINOPHIL NFR BLD AUTO: 1 % (ref 0–6)
ERYTHROCYTE [DISTWIDTH] IN BLOOD BY AUTOMATED COUNT: 11.8 % (ref 11.6–15.1)
GFR SERPL CREATININE-BSD FRML MDRD: 132 ML/MIN/1.73SQ M
GLUCOSE P FAST SERPL-MCNC: 78 MG/DL (ref 65–99)
GLUCOSE UR STRIP-MCNC: NEGATIVE MG/DL
HBV SURFACE AG SER QL: NORMAL
HCT VFR BLD AUTO: 40.5 % (ref 34.8–46.1)
HCV AB SER QL: NORMAL
HDLC SERPL-MCNC: 53 MG/DL
HGB BLD-MCNC: 13.7 G/DL (ref 11.5–15.4)
HGB UR QL STRIP.AUTO: NEGATIVE
IMM GRANULOCYTES # BLD AUTO: 0.01 THOUSAND/UL (ref 0–0.2)
IMM GRANULOCYTES NFR BLD AUTO: 0 % (ref 0–2)
KETONES UR STRIP-MCNC: NEGATIVE MG/DL
LDLC SERPL CALC-MCNC: 82 MG/DL (ref 0–100)
LEUKOCYTE ESTERASE UR QL STRIP: ABNORMAL
LYMPHOCYTES # BLD AUTO: 1.05 THOUSANDS/ΜL (ref 0.6–4.47)
LYMPHOCYTES NFR BLD AUTO: 19 % (ref 14–44)
MCH RBC QN AUTO: 32.5 PG (ref 26.8–34.3)
MCHC RBC AUTO-ENTMCNC: 33.8 G/DL (ref 31.4–37.4)
MCV RBC AUTO: 96 FL (ref 82–98)
MONOCYTES # BLD AUTO: 0.45 THOUSAND/ΜL (ref 0.17–1.22)
MONOCYTES NFR BLD AUTO: 8 % (ref 4–12)
NEUTROPHILS # BLD AUTO: 3.94 THOUSANDS/ΜL (ref 1.85–7.62)
NEUTS SEG NFR BLD AUTO: 72 % (ref 43–75)
NITRITE UR QL STRIP: NEGATIVE
NON-SQ EPI CELLS URNS QL MICRO: NORMAL /HPF
NONHDLC SERPL-MCNC: 97 MG/DL
NRBC BLD AUTO-RTO: 0 /100 WBCS
PH UR STRIP.AUTO: 8 [PH]
PLATELET # BLD AUTO: 148 THOUSANDS/UL (ref 149–390)
PMV BLD AUTO: 11.5 FL (ref 8.9–12.7)
POTASSIUM SERPL-SCNC: 3.7 MMOL/L (ref 3.5–5.3)
PROT SERPL-MCNC: 7 G/DL (ref 6.4–8.2)
PROT UR STRIP-MCNC: NEGATIVE MG/DL
RBC # BLD AUTO: 4.21 MILLION/UL (ref 3.81–5.12)
RBC #/AREA URNS AUTO: NORMAL /HPF
RH BLD: POSITIVE
RUBV IGG SERPL IA-ACNC: 92.1 IU/ML
SODIUM SERPL-SCNC: 136 MMOL/L (ref 136–145)
SP GR UR STRIP.AUTO: 1.02 (ref 1–1.03)
SPECIMEN EXPIRATION DATE: NORMAL
T4 FREE SERPL-MCNC: 1.4 NG/DL (ref 0.76–1.46)
TRIGL SERPL-MCNC: 76 MG/DL
TSH SERPL DL<=0.05 MIU/L-ACNC: 0.01 UIU/ML (ref 0.36–3.74)
UROBILINOGEN UR QL STRIP.AUTO: 1 E.U./DL
WBC # BLD AUTO: 5.51 THOUSAND/UL (ref 4.31–10.16)
WBC #/AREA URNS AUTO: NORMAL /HPF

## 2021-11-18 PROCEDURE — 81001 URINALYSIS AUTO W/SCOPE: CPT | Performed by: NURSE PRACTITIONER

## 2021-11-18 PROCEDURE — 84439 ASSAY OF FREE THYROXINE: CPT

## 2021-11-18 PROCEDURE — 87086 URINE CULTURE/COLONY COUNT: CPT

## 2021-11-18 PROCEDURE — 80061 LIPID PANEL: CPT

## 2021-11-18 PROCEDURE — 80053 COMPREHEN METABOLIC PANEL: CPT

## 2021-11-18 PROCEDURE — 80081 OBSTETRIC PANEL INC HIV TSTG: CPT

## 2021-11-18 PROCEDURE — 36415 COLL VENOUS BLD VENIPUNCTURE: CPT

## 2021-11-18 PROCEDURE — 86803 HEPATITIS C AB TEST: CPT

## 2021-11-18 PROCEDURE — 84443 ASSAY THYROID STIM HORMONE: CPT

## 2021-11-19 ENCOUNTER — ROUTINE PRENATAL (OUTPATIENT)
Dept: OBGYN CLINIC | Facility: CLINIC | Age: 31
End: 2021-11-19

## 2021-11-19 VITALS
BODY MASS INDEX: 31.66 KG/M2 | WEIGHT: 197 LBS | SYSTOLIC BLOOD PRESSURE: 120 MMHG | DIASTOLIC BLOOD PRESSURE: 75 MMHG | HEART RATE: 76 BPM | HEIGHT: 66 IN

## 2021-11-19 DIAGNOSIS — Z34.91 FIRST TRIMESTER PREGNANCY: Primary | ICD-10-CM

## 2021-11-19 DIAGNOSIS — Z3A.12 12 WEEKS GESTATION OF PREGNANCY: ICD-10-CM

## 2021-11-19 DIAGNOSIS — Z23 NEED FOR IMMUNIZATION AGAINST INFLUENZA: ICD-10-CM

## 2021-11-19 LAB
BACTERIA UR CULT: NORMAL
HIV 1+2 AB+HIV1 P24 AG SERPL QL IA: NORMAL
RPR SER QL: NORMAL

## 2021-11-19 PROCEDURE — PNV: Performed by: NURSE PRACTITIONER

## 2021-11-19 PROCEDURE — G0476 HPV COMBO ASSAY CA SCREEN: HCPCS | Performed by: NURSE PRACTITIONER

## 2021-11-19 PROCEDURE — 90471 IMMUNIZATION ADMIN: CPT

## 2021-11-19 PROCEDURE — G0145 SCR C/V CYTO,THINLAYER,RESCR: HCPCS | Performed by: NURSE PRACTITIONER

## 2021-11-19 PROCEDURE — 87591 N.GONORRHOEAE DNA AMP PROB: CPT | Performed by: NURSE PRACTITIONER

## 2021-11-19 PROCEDURE — 90686 IIV4 VACC NO PRSV 0.5 ML IM: CPT

## 2021-11-19 PROCEDURE — 87491 CHLMYD TRACH DNA AMP PROBE: CPT | Performed by: NURSE PRACTITIONER

## 2021-11-19 PROCEDURE — 3008F BODY MASS INDEX DOCD: CPT | Performed by: OBSTETRICS & GYNECOLOGY

## 2021-11-22 LAB
HPV HR 12 DNA CVX QL NAA+PROBE: NEGATIVE
HPV16 DNA CVX QL NAA+PROBE: NEGATIVE
HPV18 DNA CVX QL NAA+PROBE: NEGATIVE

## 2021-11-23 ENCOUNTER — TELEPHONE (OUTPATIENT)
Dept: OBGYN CLINIC | Facility: CLINIC | Age: 31
End: 2021-11-23

## 2021-11-23 LAB
C TRACH DNA SPEC QL NAA+PROBE: NEGATIVE
N GONORRHOEA DNA SPEC QL NAA+PROBE: NEGATIVE

## 2021-11-24 ENCOUNTER — APPOINTMENT (OUTPATIENT)
Dept: LAB | Facility: MEDICAL CENTER | Age: 31
End: 2021-11-24
Payer: COMMERCIAL

## 2021-11-24 DIAGNOSIS — R79.89 DECREASED THYROID STIMULATING HORMONE (TSH) LEVEL: ICD-10-CM

## 2021-11-24 LAB — T3 SERPL-MCNC: 1.7 NG/ML (ref 0.6–1.8)

## 2021-11-24 PROCEDURE — 36415 COLL VENOUS BLD VENIPUNCTURE: CPT

## 2021-11-24 PROCEDURE — 84480 ASSAY TRIIODOTHYRONINE (T3): CPT

## 2021-11-29 LAB
LAB AP GYN PRIMARY INTERPRETATION: NORMAL
Lab: NORMAL

## 2021-11-30 ENCOUNTER — TELEPHONE (OUTPATIENT)
Dept: PERINATAL CARE | Facility: CLINIC | Age: 31
End: 2021-11-30

## 2021-11-30 ENCOUNTER — PATIENT OUTREACH (OUTPATIENT)
Dept: OBGYN CLINIC | Facility: CLINIC | Age: 31
End: 2021-11-30

## 2021-12-02 ENCOUNTER — TELEMEDICINE (OUTPATIENT)
Dept: FAMILY MEDICINE CLINIC | Facility: CLINIC | Age: 31
End: 2021-12-02
Payer: COMMERCIAL

## 2021-12-02 ENCOUNTER — ROUTINE PRENATAL (OUTPATIENT)
Dept: PERINATAL CARE | Facility: OTHER | Age: 31
End: 2021-12-02
Payer: COMMERCIAL

## 2021-12-02 VITALS
HEIGHT: 66 IN | DIASTOLIC BLOOD PRESSURE: 65 MMHG | HEART RATE: 85 BPM | BODY MASS INDEX: 32.3 KG/M2 | WEIGHT: 201 LBS | SYSTOLIC BLOOD PRESSURE: 117 MMHG

## 2021-12-02 DIAGNOSIS — M54.41 CHRONIC RIGHT-SIDED LOW BACK PAIN WITH BILATERAL SCIATICA: Primary | ICD-10-CM

## 2021-12-02 DIAGNOSIS — G89.29 CHRONIC RIGHT-SIDED LOW BACK PAIN WITH BILATERAL SCIATICA: Primary | ICD-10-CM

## 2021-12-02 DIAGNOSIS — Z3A.13 13 WEEKS GESTATION OF PREGNANCY: ICD-10-CM

## 2021-12-02 DIAGNOSIS — M54.42 CHRONIC RIGHT-SIDED LOW BACK PAIN WITH BILATERAL SCIATICA: Primary | ICD-10-CM

## 2021-12-02 DIAGNOSIS — Z34.91 PRENATAL CARE IN FIRST TRIMESTER: ICD-10-CM

## 2021-12-02 DIAGNOSIS — Z36.82 NUCHAL TRANSLUCENCY OF FETUS ON PRENATAL ULTRASOUND: ICD-10-CM

## 2021-12-02 DIAGNOSIS — R79.89 LOW TSH LEVEL: ICD-10-CM

## 2021-12-02 DIAGNOSIS — O99.211 OBESITY COMPLICATING PREGNANCY, FIRST TRIMESTER: Primary | ICD-10-CM

## 2021-12-02 PROCEDURE — 76801 OB US < 14 WKS SINGLE FETUS: CPT | Performed by: OBSTETRICS & GYNECOLOGY

## 2021-12-02 PROCEDURE — 76813 OB US NUCHAL MEAS 1 GEST: CPT | Performed by: OBSTETRICS & GYNECOLOGY

## 2021-12-02 PROCEDURE — 99202 OFFICE O/P NEW SF 15 MIN: CPT | Performed by: OBSTETRICS & GYNECOLOGY

## 2021-12-02 PROCEDURE — 99213 OFFICE O/P EST LOW 20 MIN: CPT | Performed by: NURSE PRACTITIONER

## 2021-12-02 PROCEDURE — 4004F PT TOBACCO SCREEN RCVD TLK: CPT | Performed by: NURSE PRACTITIONER

## 2021-12-06 ENCOUNTER — HOSPITAL ENCOUNTER (EMERGENCY)
Facility: HOSPITAL | Age: 31
Discharge: HOME | End: 2021-12-06
Attending: EMERGENCY MEDICINE | Admitting: EMERGENCY MEDICINE
Payer: COMMERCIAL

## 2021-12-06 ENCOUNTER — TELEPHONE (OUTPATIENT)
Dept: OBGYN CLINIC | Facility: CLINIC | Age: 31
End: 2021-12-06

## 2021-12-06 VITALS
RESPIRATION RATE: 18 BRPM | DIASTOLIC BLOOD PRESSURE: 66 MMHG | TEMPERATURE: 98.2 F | WEIGHT: 201 LBS | SYSTOLIC BLOOD PRESSURE: 149 MMHG | OXYGEN SATURATION: 99 % | HEART RATE: 77 BPM | BODY MASS INDEX: 32.44 KG/M2

## 2021-12-06 DIAGNOSIS — M54.31 RIGHT SCIATIC NERVE PAIN: Primary | ICD-10-CM

## 2021-12-06 PROCEDURE — 99283 EMERGENCY DEPT VISIT LOW MDM: CPT

## 2021-12-06 PROCEDURE — 99282 EMERGENCY DEPT VISIT SF MDM: CPT | Performed by: EMERGENCY MEDICINE

## 2021-12-06 NOTE — TELEPHONE ENCOUNTER
14 wks pregnant  Pt called of an on going nerve pain, hurts to walk  Had to leave work early  Ask to speak with provider  Call back again to ask provider if she can get a letter to be pulled out of work  She is struggling to function even to use the restroom  Spoke with Tariq Arriola pt Patric Buerger advised for pt to go to ER due to the amount of pain

## 2021-12-13 ENCOUNTER — EVALUATION (OUTPATIENT)
Dept: PHYSICAL THERAPY | Facility: MEDICAL CENTER | Age: 31
End: 2021-12-13
Payer: COMMERCIAL

## 2021-12-13 DIAGNOSIS — M54.31 RIGHT SCIATIC NERVE PAIN: Primary | ICD-10-CM

## 2021-12-13 PROCEDURE — 97161 PT EVAL LOW COMPLEX 20 MIN: CPT | Performed by: PHYSICAL THERAPIST

## 2021-12-14 ENCOUNTER — ROUTINE PRENATAL (OUTPATIENT)
Dept: OBGYN CLINIC | Facility: CLINIC | Age: 31
End: 2021-12-14

## 2021-12-14 VITALS
SYSTOLIC BLOOD PRESSURE: 126 MMHG | HEIGHT: 66 IN | WEIGHT: 203 LBS | HEART RATE: 101 BPM | BODY MASS INDEX: 32.62 KG/M2 | DIASTOLIC BLOOD PRESSURE: 87 MMHG

## 2021-12-14 DIAGNOSIS — Z34.92 PRENATAL CARE IN SECOND TRIMESTER: ICD-10-CM

## 2021-12-14 DIAGNOSIS — Z3A.15 15 WEEKS GESTATION OF PREGNANCY: Primary | ICD-10-CM

## 2021-12-14 PROBLEM — O99.330 TOBACCO USE DURING PREGNANCY: Status: ACTIVE | Noted: 2021-12-14

## 2021-12-14 PROCEDURE — 4004F PT TOBACCO SCREEN RCVD TLK: CPT | Performed by: NURSE PRACTITIONER

## 2021-12-14 PROCEDURE — 3008F BODY MASS INDEX DOCD: CPT | Performed by: NURSE PRACTITIONER

## 2021-12-14 PROCEDURE — 99213 OFFICE O/P EST LOW 20 MIN: CPT | Performed by: NURSE PRACTITIONER

## 2021-12-15 ENCOUNTER — OFFICE VISIT (OUTPATIENT)
Dept: PHYSICAL THERAPY | Facility: MEDICAL CENTER | Age: 31
End: 2021-12-15
Payer: COMMERCIAL

## 2021-12-15 DIAGNOSIS — M54.31 RIGHT SCIATIC NERVE PAIN: Primary | ICD-10-CM

## 2021-12-15 PROCEDURE — 97110 THERAPEUTIC EXERCISES: CPT | Performed by: PHYSICAL THERAPIST

## 2021-12-15 PROCEDURE — 97140 MANUAL THERAPY 1/> REGIONS: CPT | Performed by: PHYSICAL THERAPIST

## 2021-12-15 PROCEDURE — 97112 NEUROMUSCULAR REEDUCATION: CPT | Performed by: PHYSICAL THERAPIST

## 2021-12-20 ENCOUNTER — OFFICE VISIT (OUTPATIENT)
Dept: PHYSICAL THERAPY | Facility: MEDICAL CENTER | Age: 31
End: 2021-12-20
Payer: COMMERCIAL

## 2021-12-20 DIAGNOSIS — M54.31 RIGHT SCIATIC NERVE PAIN: Primary | ICD-10-CM

## 2021-12-20 PROCEDURE — 97110 THERAPEUTIC EXERCISES: CPT | Performed by: PHYSICAL THERAPIST

## 2021-12-20 PROCEDURE — 97140 MANUAL THERAPY 1/> REGIONS: CPT | Performed by: PHYSICAL THERAPIST

## 2021-12-22 ENCOUNTER — APPOINTMENT (OUTPATIENT)
Dept: PHYSICAL THERAPY | Facility: MEDICAL CENTER | Age: 31
End: 2021-12-22
Payer: COMMERCIAL

## 2021-12-27 ENCOUNTER — OFFICE VISIT (OUTPATIENT)
Dept: PHYSICAL THERAPY | Facility: MEDICAL CENTER | Age: 31
End: 2021-12-27
Payer: COMMERCIAL

## 2021-12-27 DIAGNOSIS — M54.31 RIGHT SCIATIC NERVE PAIN: Primary | ICD-10-CM

## 2021-12-27 PROCEDURE — 97110 THERAPEUTIC EXERCISES: CPT | Performed by: PHYSICAL THERAPIST

## 2021-12-27 PROCEDURE — 97112 NEUROMUSCULAR REEDUCATION: CPT | Performed by: PHYSICAL THERAPIST

## 2021-12-27 PROCEDURE — 97140 MANUAL THERAPY 1/> REGIONS: CPT | Performed by: PHYSICAL THERAPIST

## 2021-12-29 ENCOUNTER — OFFICE VISIT (OUTPATIENT)
Dept: PHYSICAL THERAPY | Facility: MEDICAL CENTER | Age: 31
End: 2021-12-29
Payer: COMMERCIAL

## 2021-12-29 DIAGNOSIS — M54.31 RIGHT SCIATIC NERVE PAIN: Primary | ICD-10-CM

## 2021-12-29 PROCEDURE — 97110 THERAPEUTIC EXERCISES: CPT | Performed by: PHYSICAL THERAPIST

## 2021-12-29 PROCEDURE — 97140 MANUAL THERAPY 1/> REGIONS: CPT | Performed by: PHYSICAL THERAPIST

## 2022-01-03 ENCOUNTER — OFFICE VISIT (OUTPATIENT)
Dept: PHYSICAL THERAPY | Facility: MEDICAL CENTER | Age: 32
End: 2022-01-03
Payer: COMMERCIAL

## 2022-01-03 DIAGNOSIS — M54.31 RIGHT SCIATIC NERVE PAIN: Primary | ICD-10-CM

## 2022-01-03 PROCEDURE — 97112 NEUROMUSCULAR REEDUCATION: CPT | Performed by: PHYSICAL THERAPIST

## 2022-01-03 PROCEDURE — 97140 MANUAL THERAPY 1/> REGIONS: CPT | Performed by: PHYSICAL THERAPIST

## 2022-01-03 NOTE — PROGRESS NOTES
Daily Note     Today's date: 1/3/2022  Patient name: Yenni Feliz  : 1990  MRN: [id number]  Referring provider: Lowell Plaza MD  Dx:   Encounter Diagnosis     ICD-10-CM    1  Right sciatic nerve pain  M54 31                   Subjective: Patient reports continued improvement  Objective: See treatment diary below      Assessment: Tolerated treatment well  Patient demonstrated fatigue post treatment, exhibited good technique with therapeutic exercises and would benefit from continued PT  Most tightness noted over piriformis  Plan: Continue per plan of care  Progress treatment as tolerated         Precautions 16 weeks preg       Manuals 1/3    12/29   Piriformis str 2 min    AK   Piriformis rel 10 min    AK   QL release 3 min    AK           Neuro Re-Ed  1/3       TA 5"x20    5" 20x   TA bridge 5"x20    20x   TA march x20    20x ea   TA SLR x20 ea    20x ea           Sciatic n glides np    2x10           Ther Ex 1/3       bike 5 min    5 min   SKTC 15"x5    15" 5x ea           LTRs 10" x10    10" 10x                                   Ther Activity                        Gait Training                        Modalities

## 2022-01-05 ENCOUNTER — TELEPHONE (OUTPATIENT)
Dept: PERINATAL CARE | Facility: CLINIC | Age: 32
End: 2022-01-05

## 2022-01-05 ENCOUNTER — APPOINTMENT (OUTPATIENT)
Dept: PHYSICAL THERAPY | Facility: MEDICAL CENTER | Age: 32
End: 2022-01-05
Payer: COMMERCIAL

## 2022-01-05 ENCOUNTER — TELEPHONE (OUTPATIENT)
Dept: OBGYN CLINIC | Facility: CLINIC | Age: 32
End: 2022-01-05

## 2022-01-05 NOTE — TELEPHONE ENCOUNTER
Pt reported dull abdominal pain since yesterday AM - 4/10  Per pt fetal movement, but denied contractions, no bleeding, no spotting and no leakage of fluids  RN offered pt appt - pt declined  Pt reported fatigue and diarrhea x 3 days denied blood  Per pt no sore throat, no coughing, no congestion, no loss of taste/smell, no N/V and not breathing fast or hard  Appt made for tomorrow AM at [address] [place] [address] advised pt per provider to administer tylenol as directed, call back if symptoms worsen and seek medical attention if emergent situation arises prior to appt tomorrow  Pt had a verbal understanding and was comfortable with the plan

## 2022-01-05 NOTE — TELEPHONE ENCOUNTER
Operative Procedure Note    Patient:  Kayli Fernandez    Preop Dx:  Recurrent anterior dislocation left shoulder    Postop Dx:  Same    Procedure:  Left shoulder Laterjet procedure    Surgeon:  Reji Burnette MD    Assistants:  Sandra DAMON    Anesthesia Staff:  Anesthesiologist: Nico Das MD    Anesthesia Type:  General with block    Findings:  As above    Estimated Blood Loss:  100 cc    Complications:  None    Specimens Removed:  None sent    Narrative Summary:  Patient had multiple recurrent anterior dislocations of the left shoulder.  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  Patient had multiple dislocations since that MRI.    Following medical clearance and interscalene block, patient was brought to the operating room.  General anesthesia was administered.  Patient was in approximately 6 degree seated.  Head was secured with Coban.  A arm angelo was used.  Left arm was draped free.  Left shoulder prepped and draped in sterile fashion.  Time-out performed.    Deltopectoral incision was made.  Deltoid retracted laterally.  The coracoid was identified.  Coracoacromial ligament and pectoralis minor attachments released.  With the aid of an osteotome coracoid osteotomy was performed removing approximately 2.4 cm of coracoid.  The conjoined tendon was kept intact.  The medial edge of the coracoid was squared off to a flat surface using a saw.  Two drill holes were then placed what essentially was from lateral to medial as this would be rotated when the graft was placed.  The subscapularis muscle was then split between its superior 2/3 and inferior 1/3 longitudinally.  Capsulotomy was performed.  There was redundancy of the capsule.  There was no remaining anterior labral tissue present.  Articular surfaces that were visible were unremarkable.  There was a it was felt on inspection some loss of prominence of the anterior glenoid.  The bone just medial to the articular  Patient mailed [facility] IG Part 2 Sequential Screen paper order form and instruction letter on 12/10/21  No results pending in [facility] IG portal   Patient currently 18 weeks 5 days gestation  Phone call to Kellee Doctor, she reports her mail delivery has been very sporadic, recently missed delivery of other important papers  Reviewed the Part 2 screen and date range to complete, patient states she will go to [facility] outpatient lab Friday Jan 7th  Patient aware MFM will fax over lab order today  Lab faxed to [facility]  [address] [place] [address] location to attention Bevely Peto @ # [phone] and lab called [phone] and made aware patient will be coming Friday 01/07  MFM received confirmation fax recieved " Complete" @ 13:52  surface was roughened to a bleeding surface using a curette.  With the aid of the guide using 6 mm offset, 1st guidewires, then drill holes were inserted into the glenoid and the bone graft was advanced to the glenoid using cancellous screws.  The screws were tightened and the graft secured.  Itcontacted the anterior glenoid surface incongruent in satisfactory fashion knee that matched the glenoid contour.  The wound was then irrigated and closed in layers.  Soft dressings applied.  Patient placed in a shoulder immobilizer.  Returned to recovery in satisfactory condition..      Implant Name Type Inv. Item Serial No.  Lot No. LRB No. Used Action   SCREW BN 3.75MM 34MM FULL THRD SELF DRILL SELF TAP - [id number] Screw SCREW BN 3.75MM 34MM FULL THRD SELF DRILL SELF TAP  [facility]  Left 2 Implanted

## 2022-01-06 ENCOUNTER — ROUTINE PRENATAL (OUTPATIENT)
Dept: OBGYN CLINIC | Facility: CLINIC | Age: 32
End: 2022-01-06

## 2022-01-06 VITALS
BODY MASS INDEX: 33.11 KG/M2 | DIASTOLIC BLOOD PRESSURE: 83 MMHG | HEART RATE: 91 BPM | SYSTOLIC BLOOD PRESSURE: 134 MMHG | HEIGHT: 66 IN | WEIGHT: 206 LBS

## 2022-01-06 DIAGNOSIS — M62.89 PELVIC FLOOR DYSFUNCTION: ICD-10-CM

## 2022-01-06 DIAGNOSIS — N94.9 ROUND LIGAMENT PAIN: Primary | ICD-10-CM

## 2022-01-06 PROCEDURE — 99213 OFFICE O/P EST LOW 20 MIN: CPT | Performed by: OBSTETRICS & GYNECOLOGY

## 2022-01-06 PROCEDURE — 4004F PT TOBACCO SCREEN RCVD TLK: CPT | Performed by: OBSTETRICS & GYNECOLOGY

## 2022-01-06 NOTE — PROGRESS NOTES
[facility]  OB/GYN prenatal visit    S: 32 y o  Rebecca Huang 18w6d here for PN visit  She has no obstetric complaints, including pelvic pain, contractions, vaginal bleeding, loss of fluid, or decreased fetal movement   Her primary complaint is sciatic and round ligament pain    O:  Vitals:    22 0914   BP: 134/83   Pulse: 91       Gen: no acute distress, nonlabored breathing   FHT: 147       A/P:      IUP at 18w6d  No obstetric complaints today  TWG: + 4kg   Vaccinations: planning COVID vacciantion  Contraception: considering cvasectomy  Breastfeeding: no, s p breast reduction  Birth plan: vaginal  Discussed  labor precautions and fetal kick counts    Return to clinic in 4 weeks    COVID vaccine: planned    COVID 19 precautions were discussed with patient at length, reviewed symptoms, hygiene, social distancing, patient to call office  with questions/concerns    MSAFP sent by [facility] to [place] per patient report    Susie Anthony MD  2022  10:20 AM

## 2022-01-10 ENCOUNTER — EVALUATION (OUTPATIENT)
Dept: PHYSICAL THERAPY | Facility: MEDICAL CENTER | Age: 32
End: 2022-01-10
Payer: COMMERCIAL

## 2022-01-10 DIAGNOSIS — M62.89 PELVIC FLOOR DYSFUNCTION: ICD-10-CM

## 2022-01-10 DIAGNOSIS — N94.9 ROUND LIGAMENT PAIN: ICD-10-CM

## 2022-01-10 DIAGNOSIS — M54.31 RIGHT SCIATIC NERVE PAIN: Primary | ICD-10-CM

## 2022-01-10 PROCEDURE — 97112 NEUROMUSCULAR REEDUCATION: CPT | Performed by: PHYSICAL THERAPIST

## 2022-01-10 PROCEDURE — 97110 THERAPEUTIC EXERCISES: CPT | Performed by: PHYSICAL THERAPIST

## 2022-01-10 PROCEDURE — 97140 MANUAL THERAPY 1/> REGIONS: CPT | Performed by: PHYSICAL THERAPIST

## 2022-01-10 NOTE — PROGRESS NOTES
PT Re-Evaluation     Today's date: 1/10/2022  Patient name: Isreal Reed  : 1990  MRN: [id number]  Referring provider: Lucy Sadler MD  Dx:   Encounter Diagnosis     ICD-10-CM    1  Right sciatic nerve pain  M54 31    2  Round ligament pain  N94 9 Ambulatory referral to Physical Therapy   3  Pelvic floor dysfunction  M62 89 Ambulatory referral to Physical Therapy                  Assessment  Assessment details: Patient has made significant progress towards short and long term goals since beginning physical therapy  Patient has decreased pain, increased range of motion and increased strength  Patient also able to perform functional activities such as walking and household chores better than initially  Patient will benefit from continued physical therapy in order to continue to address impairments and to maximize function  Will finish up in the next 2 weeks  Impairments: abnormal muscle tone, abnormal or restricted ROM, activity intolerance, impaired physical strength, lacks appropriate home exercise program and pain with function    Goals  STG  Decrease pain by 50% in 4 weeks  Met  Increase range of motion by 10 degrees in 4 weeks  PM  Increase strength by half a grade in 4 weeks  PM  LTG  Patient will be independent in hep in 4 weeks  Met  Patient will be able to perform adls at [facility] by D/C  PM  Patient will be able to perform work duties at [employer] by D/C   NM    Plan  Patient would benefit from: skilled physical therapy  Referral necessary: No  Planned modality interventions: thermotherapy: hydrocollator packs  Planned therapy interventions: abdominal trunk stabilization, manual therapy, massage, neuromuscular re-education, patient education, strengthening, stretching, therapeutic activities, therapeutic exercise, functional ROM exercises, flexibility and home exercise program  Frequency: 2x week  Duration in weeks: 4  Plan of Care beginning date: 1/10/2022  Plan of Care expiration date: 2022  Treatment plan discussed with: patient        Subjective Evaluation    History of Present Illness  Mechanism of injury: Patient has been attending formal PT for about 1 month  Patient reports significant improvement since beginning physical therapy  Notes she is functioning much better than what she had been  Notes the last two weeks, she has not had much discomfort  When she feels some increased tightness, she reports doing an extra stretch and it is much better  Patient wants to finish out therapy here before going to see women's health specialist for new issue  Pain  Current pain ratin  At best pain ratin  At worst pain ratin  Quality: tight and dull ache  Relieving factors: medications, heat and rest  Progression: improved    Patient Goals  Patient goals for therapy: decreased pain, independence with ADLs/IADLs and return to work  Patient goal: function normally; house cleaning        Objective     Palpation     Right   Tenderness of the piriformis and quadratus lumborum  Trigger point to piriformis  Strength/Myotome Testing     Left Hip   Normal muscle strength    Right Hip   Planes of Motion   Flexion: 4  Extension: 4  Abduction: 4  Adduction: 4+    Left Knee   Normal strength    Right Knee   Flexion: 4  Extension: 4+    Left Ankle/Foot   Normal strength    Right Ankle/Foot   Normal strength    Tests     Right Hip   Positive piriformis                   Precautions 20 weeks preg       Manuals 1/3 1/10   12/29   Piriformis str 2 min AK   AK   Piriformis rel 10 min AK   AK   QL release 3 min AK   AK           Neuro Re-Ed  1/3       TA 5"x20 5" 20x   5" 20x   TA bridge 5"x20 5" 20x   20x   TA march x20 20x   20x ea   TA SLR x20 ea 20x ea   20x ea           Sciatic n glides np 20x ea   2x10           Ther Ex 1/3       bike 5 min 5 min   5 min   SKTC 15"x5 15" 5x   15" 5x ea           LTRs 10" x10 10"10x   10" 10x                                   Ther Activity Gait Training                        Modalities

## 2022-01-11 ENCOUNTER — APPOINTMENT (OUTPATIENT)
Dept: LAB | Facility: MEDICAL CENTER | Age: 32
End: 2022-01-11
Payer: COMMERCIAL

## 2022-01-11 DIAGNOSIS — Z33.1 PREGNANT STATE, INCIDENTAL: ICD-10-CM

## 2022-01-11 DIAGNOSIS — Z36.9 UNSPECIFIED ANTENATAL SCREENING: ICD-10-CM

## 2022-01-11 PROCEDURE — 36415 COLL VENOUS BLD VENIPUNCTURE: CPT

## 2022-01-12 ENCOUNTER — APPOINTMENT (OUTPATIENT)
Dept: PHYSICAL THERAPY | Facility: MEDICAL CENTER | Age: 32
End: 2022-01-12
Payer: COMMERCIAL

## 2022-01-12 LAB — SCAN RESULT: NORMAL

## 2022-01-14 ENCOUNTER — ROUTINE PRENATAL (OUTPATIENT)
Dept: PERINATAL CARE | Facility: OTHER | Age: 32
End: 2022-01-14
Payer: COMMERCIAL

## 2022-01-14 VITALS
SYSTOLIC BLOOD PRESSURE: 131 MMHG | HEART RATE: 108 BPM | HEIGHT: 66 IN | WEIGHT: 214 LBS | BODY MASS INDEX: 34.39 KG/M2 | DIASTOLIC BLOOD PRESSURE: 66 MMHG

## 2022-01-14 DIAGNOSIS — O26.872 CERVICAL SHORTENING, SECOND TRIMESTER: Primary | ICD-10-CM

## 2022-01-14 DIAGNOSIS — Z36.86 ENCOUNTER FOR ANTENATAL SCREENING FOR CERVICAL LENGTH: ICD-10-CM

## 2022-01-14 DIAGNOSIS — O99.212 MATERNAL OBESITY, ANTEPARTUM, SECOND TRIMESTER: ICD-10-CM

## 2022-01-14 DIAGNOSIS — Z3A.20 20 WEEKS GESTATION OF PREGNANCY: ICD-10-CM

## 2022-01-14 DIAGNOSIS — O99.332 TOBACCO SMOKING AFFECTING PREGNANCY IN SECOND TRIMESTER: ICD-10-CM

## 2022-01-14 PROCEDURE — 76817 TRANSVAGINAL US OBSTETRIC: CPT | Performed by: OBSTETRICS & GYNECOLOGY

## 2022-01-14 PROCEDURE — 3008F BODY MASS INDEX DOCD: CPT | Performed by: OBSTETRICS & GYNECOLOGY

## 2022-01-14 PROCEDURE — 76811 OB US DETAILED SNGL FETUS: CPT | Performed by: OBSTETRICS & GYNECOLOGY

## 2022-01-14 PROCEDURE — 99214 OFFICE O/P EST MOD 30 MIN: CPT | Performed by: OBSTETRICS & GYNECOLOGY

## 2022-01-14 PROCEDURE — 4004F PT TOBACCO SCREEN RCVD TLK: CPT | Performed by: OBSTETRICS & GYNECOLOGY

## 2022-01-14 RX ORDER — ASPIRIN 81 MG/1
81 TABLET ORAL DAILY
COMMUNITY
End: 2022-05-20

## 2022-01-14 NOTE — PROGRESS NOTES
Please refer to the [facility] ultrasound report in Ob Procedures for additional information regarding today's visit

## 2022-01-14 NOTE — PROGRESS NOTES
Ultrasound Probe Disinfection    A transvaginal ultrasound was performed  Prior to use, disinfection was performed with High Level Disinfection Process (Trophon)  Probe serial number M2: [id number] was used        Tereza Haley  01/14/22  11:04 AM

## 2022-01-14 NOTE — LETTER
January 14, 2022     [address]  PROVIDER    Patient: Stanton Vincent   YOB: 1990   Date of Visit: 1/14/2022       Dear   Provider: Thank you for referring Rienzo Leivasus to me for evaluation  Below are my notes for this consultation  If you have questions, please do not hesitate to call me  I look forward to following your patient along with you  Sincerely,        Kaden Ibarra MD        CC: No Recipients  Kaden Ibarra MD  1/14/2022  7:59 AM  Sign when Signing Visit  Please refer to the [facility] ultrasound report in Ob Procedures for additional information regarding today's visit

## 2022-01-17 ENCOUNTER — APPOINTMENT (OUTPATIENT)
Dept: PHYSICAL THERAPY | Facility: MEDICAL CENTER | Age: 32
End: 2022-01-17
Payer: COMMERCIAL

## 2022-01-24 ENCOUNTER — APPOINTMENT (OUTPATIENT)
Dept: PHYSICAL THERAPY | Facility: MEDICAL CENTER | Age: 32
End: 2022-01-24
Payer: COMMERCIAL

## 2022-01-26 ENCOUNTER — OFFICE VISIT (OUTPATIENT)
Dept: PHYSICAL THERAPY | Facility: MEDICAL CENTER | Age: 32
End: 2022-01-26
Payer: COMMERCIAL

## 2022-01-26 ENCOUNTER — APPOINTMENT (OUTPATIENT)
Dept: PHYSICAL THERAPY | Facility: MEDICAL CENTER | Age: 32
End: 2022-01-26
Payer: COMMERCIAL

## 2022-01-26 DIAGNOSIS — M54.31 RIGHT SCIATIC NERVE PAIN: Primary | ICD-10-CM

## 2022-01-26 PROCEDURE — 97112 NEUROMUSCULAR REEDUCATION: CPT | Performed by: PHYSICAL THERAPIST

## 2022-01-26 PROCEDURE — 97110 THERAPEUTIC EXERCISES: CPT | Performed by: PHYSICAL THERAPIST

## 2022-01-26 NOTE — PROGRESS NOTES
Daily Note and Discharge    Today's date: 2022  Patient name: Tana Desai  : 1990  MRN: [id number]  Referring provider: Monae Zuluaga MD   Dx:   Encounter Diagnosis     ICD-10-CM    1  Right sciatic nerve pain  M54 31                   Subjective: Patient reports she has not had any back pain since last session 2 weeks ago  Also notes round ligament pain has gone away as well  Today will be last appointment with DC to [facility]  Objective: See treatment diary below      Assessment: Tolerated treatment well  Patient exhibited good technique with therapeutic exercises      Plan: DC to [facility]       Precautions 20 weeks preg       Manuals 1/3 1/10 1/26  12/29   Piriformis str 2 min AK   AK   Piriformis rel 10 min AK   AK   QL release 3 min AK   AK           Neuro Re-Ed  1/3       TA 5"x20 5" 20x 5" 20x  5" 20x   TA bridge 5"x20 5" 20x 5" 20x  20x   TA march x20 20x 20x  20x ea   TA SLR x20 ea 20x ea 20x ea  20x ea           Sciatic n glides np 20x ea 20x ea  2x10           Ther Ex 1/3       bike 5 min 5 min 6 min  5 min   SKTC 15"x5 15" 5x 15" 5x  15" 5x ea           LTRs 10" x10 10"10x 10"10x  10" 10x                                   Ther Activity                        Gait Training                        Modalities

## 2022-01-29 ENCOUNTER — NURSE TRIAGE (OUTPATIENT)
Dept: OTHER | Facility: OTHER | Age: 32
End: 2022-01-29

## 2022-01-29 NOTE — TELEPHONE ENCOUNTER
Regarding: Gavino valladares/ [address] Suresh Manrique  ----- Message from Clayton Wilson sent at 1/29/2022  3:18 PM EST -----  "I am having a greenish brown discharge "

## 2022-01-29 NOTE — TELEPHONE ENCOUNTER
Reason for Disposition   Abnormal color vaginal discharge (i e , yellow, green, gray)    Answer Assessment - Initial Assessment Questions  1  DISCHARGE: "Describe the discharge " (e g , white, yellow, green, gray, foamy, cottage cheese-like)      Been on and off since starting progesterone, slimy consistency, brown and green  2  ODOR: "Is there a bad odor?"      Denies  3  ONSET: "When did the discharge begin?"      For about a week  4  RASH: "Is there a rash in that area?" If Yes, ask: "Describe it " (e g , redness, blisters, sores, bumps)      Denies  5  ABDOMINAL PAIN: "Are you having any abdominal pain?" If Yes, ask: "What does it feel like?" (e g , crampy, dull, intermittent, constant)       Denies  6  ABDOMINAL PAIN SEVERITY: If present, ask: "How bad is it?"  (e g , Scale 1-10; mild, moderate, or severe)    - MILD (1-3): doesn't interfere with normal activities, abdomen soft and not tender to touch     - MODERATE (4-7): interferes with normal activities or awakens from sleep, tender to touch     - SEVERE (8-10): excruciating pain, doubled over, unable to do any normal activities      NA  7  CAUSE: "What do you think is causing the discharge?"      Progesterone? 8  OTHER SYMPTOMS: "Do you have any other symptoms?" (e g , fever, itching, vaginal bleeding, pain with urination)      Denies  9  LILI: "What date are you expecting to deliver?"       6/3  10   PREGNANCY: "How many weeks pregnant are you?"        22 weeks    Protocols used: PREGNANCY - VAGINAL DISCHARGE-ADULT-

## 2022-01-31 NOTE — PATIENT INSTRUCTIONS
Thank you for choosing us for your  care today  If you have any questions about your ultrasound or care, please do not hesitate to contact us or your primary obstetrician  Some general instructions for your pregnancy are:     Protect against coronavirus: get vaccinated and mask up  Pregnant women are increased risk of severe COVID  Notify your primary care doctor if you have any symptoms including cough, shortness of breath or difficulty breathing, fever, chills, muscle pain, sore throat, or loss of taste or smell   Exercise: Aim for 22 minutes per day (150 minutes per week) of regular exercise  Walking is great!  Nutrition: aim for calcium-rich and iron-rich foods as well as healthy sources of protein   Learn about Preeclampsia: preeclampsia is a common, serious high blood pressure complication in pregnancy  A blood pressure of 853TMMG (systolic or top number) or 93EQYS (diastolic or bottom number) is not normal and needs evaluation by your doctor  Aspirin is sometimes prescribed in early pregnancy to prevent preeclampsia in women with risk factors - ask your obstetrician if you should be on this medication   If you smoke, try to reduce how many cigarettes you smoke or try to quit completely  Do not vape   Other warning signs to watch out for in pregnancy or postpartum: chest pain, obstructed breathing or shortness of breath, seizures, thoughts of hurting yourself or your baby, bleeding, a painful or swollen leg, fever, or headache (see [facility] POST-BIRTH Warning Signs campaign)  If these happen call 911  Itching is also not normal in pregnancy and if you experience this, especially over your hands and feet, potentially worse at night, notify your doctors

## 2022-02-01 ENCOUNTER — ROUTINE PRENATAL (OUTPATIENT)
Dept: PERINATAL CARE | Facility: CLINIC | Age: 32
End: 2022-02-01
Payer: COMMERCIAL

## 2022-02-01 VITALS
WEIGHT: 213.6 LBS | HEIGHT: 66 IN | DIASTOLIC BLOOD PRESSURE: 62 MMHG | SYSTOLIC BLOOD PRESSURE: 120 MMHG | HEART RATE: 82 BPM | BODY MASS INDEX: 34.33 KG/M2

## 2022-02-01 DIAGNOSIS — Z36.86 ENCOUNTER FOR ANTENATAL SCREENING FOR CERVICAL LENGTH: ICD-10-CM

## 2022-02-01 DIAGNOSIS — Z36.2 ENCOUNTER FOR FOLLOW-UP ULTRASOUND OF FETAL ANATOMY: ICD-10-CM

## 2022-02-01 DIAGNOSIS — O26.872 CERVICAL SHORTENING, SECOND TRIMESTER: ICD-10-CM

## 2022-02-01 DIAGNOSIS — O99.332 TOBACCO SMOKING AFFECTING PREGNANCY IN SECOND TRIMESTER: Primary | ICD-10-CM

## 2022-02-01 PROCEDURE — 76817 TRANSVAGINAL US OBSTETRIC: CPT | Performed by: OBSTETRICS & GYNECOLOGY

## 2022-02-01 PROCEDURE — 76816 OB US FOLLOW-UP PER FETUS: CPT | Performed by: OBSTETRICS & GYNECOLOGY

## 2022-02-01 PROCEDURE — 99212 OFFICE O/P EST SF 10 MIN: CPT | Performed by: OBSTETRICS & GYNECOLOGY

## 2022-02-01 NOTE — PROGRESS NOTES
Ultrasound Probe Disinfection    A transvaginal ultrasound was performed  Prior to use, disinfection was performed with High Level Disinfection Process (Trophon)  Probe serial number B3: [id number] was used        Alyne Bernheim  02/01/22  10:19 AM

## 2022-02-01 NOTE — PROGRESS NOTES
[address]: Ms Didi Guaman was seen today for serial cervical length screening ultrasound  See ultrasound report under "OB Procedures" tab  The time spent on this established patient on the encounter date included 5 minutes previsit service time reviewing records and precharting, 5 minutes face-to-face service time counseling regarding results and coordinating care, and  4 minutes charting, totalling 14 minutes  Please don't hesitate to contact our office with any concerns or questions    Anatoliy Novak MD

## 2022-02-03 ENCOUNTER — ROUTINE PRENATAL (OUTPATIENT)
Dept: OBGYN CLINIC | Facility: CLINIC | Age: 32
End: 2022-02-03

## 2022-02-03 VITALS
WEIGHT: 215.2 LBS | HEART RATE: 91 BPM | SYSTOLIC BLOOD PRESSURE: 122 MMHG | BODY MASS INDEX: 34.58 KG/M2 | HEIGHT: 66 IN | DIASTOLIC BLOOD PRESSURE: 78 MMHG

## 2022-02-03 DIAGNOSIS — Z3A.22 22 WEEKS GESTATION OF PREGNANCY: ICD-10-CM

## 2022-02-03 DIAGNOSIS — Z34.92 PRENATAL CARE IN SECOND TRIMESTER: Primary | ICD-10-CM

## 2022-02-03 PROBLEM — Z3A.15 15 WEEKS GESTATION OF PREGNANCY: Status: RESOLVED | Noted: 2021-12-14 | Resolved: 2022-02-03

## 2022-02-03 PROCEDURE — 99213 OFFICE O/P EST LOW 20 MIN: CPT | Performed by: OBSTETRICS & GYNECOLOGY

## 2022-02-03 NOTE — PROGRESS NOTES
Omega Gaucher presents today for routine OB visit at 22w6d  Blood Pressure: 122/78  Wt=97 6 kg (215 lb 3 2 oz); Body mass index is 34 73 kg/m² ; TWG=8 255 kg (18 lb 3 2 oz)   ; Abdomen: gravid, soft, non-tender  She reports no complaints  Denies uterine contractions or persistent cramping  Denies vaginal bleeding or leaking of fluid  Reports fetal movement  Scheduled for ultrasound 04/12/22  She is smoking 5 cigs/day  Reviewed common discomforts of pregnancy in second trimester and warning signs  Advised to continue medications and return in 4 weeks  Current Outpatient Medications   Medication Instructions    aspirin (ECOTRIN LOW STRENGTH) 81 mg, Oral, Daily    FLUoxetine (PROzac) 20 mg capsule No dose, route, or frequency recorded      Prenatal Vit-DSS-Fe Fum-FA (Prenatal 19) tablet 1 tablet, Oral, Daily    progesterone 100 mg, Vaginal, Daily at bedtime         Pregnancy Problems (from 11/02/21 to present)     Problem Noted Resolved    15 weeks gestation of pregnancy 12/14/2021 by LUIS Rosas No    Tobacco use during pregnancy 12/14/2021 by LUIS Rosas No    Obesity complicating pregnancy, first trimester 12/2/2021 by Asher Lopez MD No

## 2022-02-13 ENCOUNTER — HOSPITAL ENCOUNTER (OUTPATIENT)
Facility: HOSPITAL | Age: 32
Setting detail: OBSERVATION
Discharge: HOME | End: 2022-02-14
Attending: OBSTETRICS & GYNECOLOGY | Admitting: OBSTETRICS & GYNECOLOGY
Payer: COMMERCIAL

## 2022-02-13 DIAGNOSIS — Z3A.24 24 WEEKS GESTATION OF PREGNANCY: Primary | ICD-10-CM

## 2022-02-13 NOTE — LETTER
[place] [id number]  Dept: [phone]    February 14, 2022     Patient: Tana Desai   YOB: 1990   Date of Visit: 2/13/2022       To Whom it May Concern:    Britni Sykes is under my professional care  She was seen in the hospital from 2/13/2022   to 02/14/22  [place] accompanied her during her stay  If you have any questions or concerns, please don't hesitate to call           Sincerely,          Randa Gorman MD

## 2022-02-14 ENCOUNTER — APPOINTMENT (OUTPATIENT)
Dept: ULTRASOUND IMAGING | Facility: HOSPITAL | Age: 32
End: 2022-02-14
Payer: COMMERCIAL

## 2022-02-14 VITALS
BODY MASS INDEX: 34.23 KG/M2 | HEIGHT: 66 IN | DIASTOLIC BLOOD PRESSURE: 58 MMHG | TEMPERATURE: 98.8 F | OXYGEN SATURATION: 94 % | RESPIRATION RATE: 18 BRPM | SYSTOLIC BLOOD PRESSURE: 109 MMHG | HEART RATE: 83 BPM | WEIGHT: 213 LBS

## 2022-02-14 PROBLEM — O99.212 MATERNAL OBESITY, ANTEPARTUM, SECOND TRIMESTER: Status: RESOLVED | Noted: 2022-01-14 | Resolved: 2022-02-14

## 2022-02-14 PROBLEM — O99.212 OBESITY AFFECTING PREGNANCY IN SECOND TRIMESTER: Status: ACTIVE | Noted: 2021-12-02

## 2022-02-14 PROBLEM — D69.6 THROMBOCYTOPENIA (HCC): Status: ACTIVE | Noted: 2022-02-14

## 2022-02-14 PROBLEM — Z3A.24 24 WEEKS GESTATION OF PREGNANCY: Status: ACTIVE | Noted: 2022-02-14

## 2022-02-14 PROBLEM — O99.330 TOBACCO USE DURING PREGNANCY: Status: RESOLVED | Noted: 2021-12-14 | Resolved: 2022-02-14

## 2022-02-14 PROBLEM — R10.9 RIGHT FLANK DISCOMFORT: Status: ACTIVE | Noted: 2022-02-14

## 2022-02-14 PROBLEM — R31.9 HEMATURIA: Status: ACTIVE | Noted: 2022-02-14

## 2022-02-14 LAB
ABO GROUP BLD: NORMAL
ALBUMIN SERPL BCP-MCNC: 3.1 G/DL (ref 3.5–5)
ALP SERPL-CCNC: 61 U/L (ref 46–116)
ALT SERPL W P-5'-P-CCNC: 18 U/L (ref 12–78)
ANION GAP SERPL CALCULATED.3IONS-SCNC: 8 MMOL/L (ref 4–13)
AST SERPL W P-5'-P-CCNC: 13 U/L (ref 5–45)
BACTERIA UR QL AUTO: ABNORMAL /HPF
BASOPHILS # BLD AUTO: 0.03 THOUSANDS/ΜL (ref 0–0.1)
BASOPHILS NFR BLD AUTO: 0 % (ref 0–1)
BILIRUB SERPL-MCNC: 0.17 MG/DL (ref 0.2–1)
BILIRUB UR QL STRIP: NEGATIVE
BLD GP AB SCN SERPL QL: NEGATIVE
BUN SERPL-MCNC: 9 MG/DL (ref 5–25)
CALCIUM ALBUM COR SERPL-MCNC: 9.1 MG/DL (ref 8.3–10.1)
CALCIUM SERPL-MCNC: 8.4 MG/DL (ref 8.3–10.1)
CHLORIDE SERPL-SCNC: 103 MMOL/L (ref 100–108)
CLARITY UR: ABNORMAL
CO2 SERPL-SCNC: 24 MMOL/L (ref 21–32)
COLOR UR: ABNORMAL
CREAT SERPL-MCNC: 0.46 MG/DL (ref 0.6–1.3)
EOSINOPHIL # BLD AUTO: 0.11 THOUSAND/ΜL (ref 0–0.61)
EOSINOPHIL NFR BLD AUTO: 1 % (ref 0–6)
ERYTHROCYTE [DISTWIDTH] IN BLOOD BY AUTOMATED COUNT: 12.9 % (ref 11.6–15.1)
GFR SERPL CREATININE-BSD FRML MDRD: 132 ML/MIN/1.73SQ M
GLUCOSE 1H P 50 G GLC PO SERPL-MCNC: 150 MG/DL (ref 40–134)
GLUCOSE SERPL-MCNC: 84 MG/DL (ref 65–140)
GLUCOSE UR STRIP-MCNC: NEGATIVE MG/DL
HCT VFR BLD AUTO: 36.3 % (ref 34.8–46.1)
HGB BLD-MCNC: 12.3 G/DL (ref 11.5–15.4)
HGB UR QL STRIP.AUTO: ABNORMAL
IMM GRANULOCYTES # BLD AUTO: 0.06 THOUSAND/UL (ref 0–0.2)
IMM GRANULOCYTES NFR BLD AUTO: 1 % (ref 0–2)
KETONES UR STRIP-MCNC: NEGATIVE MG/DL
LEUKOCYTE ESTERASE UR QL STRIP: NEGATIVE
LYMPHOCYTES # BLD AUTO: 2.22 THOUSANDS/ΜL (ref 0.6–4.47)
LYMPHOCYTES NFR BLD AUTO: 23 % (ref 14–44)
MCH RBC QN AUTO: 33 PG (ref 26.8–34.3)
MCHC RBC AUTO-ENTMCNC: 33.9 G/DL (ref 31.4–37.4)
MCV RBC AUTO: 97 FL (ref 82–98)
MONOCYTES # BLD AUTO: 0.66 THOUSAND/ΜL (ref 0.17–1.22)
MONOCYTES NFR BLD AUTO: 7 % (ref 4–12)
MUCOUS THREADS UR QL AUTO: ABNORMAL
NEUTROPHILS # BLD AUTO: 6.5 THOUSANDS/ΜL (ref 1.85–7.62)
NEUTS SEG NFR BLD AUTO: 68 % (ref 43–75)
NITRITE UR QL STRIP: NEGATIVE
NON-SQ EPI CELLS URNS QL MICRO: ABNORMAL /HPF
NRBC BLD AUTO-RTO: 0 /100 WBCS
PH UR STRIP.AUTO: 7 [PH] (ref 4.5–8)
PLATELET # BLD AUTO: 130 THOUSANDS/UL (ref 149–390)
PMV BLD AUTO: 11.2 FL (ref 8.9–12.7)
POTASSIUM SERPL-SCNC: 3.7 MMOL/L (ref 3.5–5.3)
PROT SERPL-MCNC: 6.8 G/DL (ref 6.4–8.2)
PROT UR STRIP-MCNC: ABNORMAL MG/DL
RBC # BLD AUTO: 3.73 MILLION/UL (ref 3.81–5.12)
RBC #/AREA URNS AUTO: ABNORMAL /HPF
RH BLD: POSITIVE
RPR SER QL: NORMAL
SODIUM SERPL-SCNC: 135 MMOL/L (ref 136–145)
SP GR UR STRIP.AUTO: 1.02 (ref 1–1.03)
SPECIMEN EXPIRATION DATE: NORMAL
T4 FREE SERPL-MCNC: 0.9 NG/DL (ref 0.76–1.46)
TSH SERPL DL<=0.05 MIU/L-ACNC: 0.33 UIU/ML (ref 0.36–3.74)
UROBILINOGEN UR QL STRIP.AUTO: 0.2 E.U./DL
WBC # BLD AUTO: 9.58 THOUSAND/UL (ref 4.31–10.16)
WBC #/AREA URNS AUTO: ABNORMAL /HPF

## 2022-02-14 PROCEDURE — 99218 PR INITIAL OBSERVATION CARE/DAY 30 MINUTES: CPT | Performed by: OBSTETRICS & GYNECOLOGY

## 2022-02-14 PROCEDURE — 76770 US EXAM ABDO BACK WALL COMP: CPT

## 2022-02-14 PROCEDURE — 76817 TRANSVAGINAL US OBSTETRIC: CPT

## 2022-02-14 PROCEDURE — 81001 URINALYSIS AUTO W/SCOPE: CPT

## 2022-02-14 PROCEDURE — 59025 FETAL NON-STRESS TEST: CPT | Performed by: OBSTETRICS & GYNECOLOGY

## 2022-02-14 PROCEDURE — 84439 ASSAY OF FREE THYROXINE: CPT | Performed by: OBSTETRICS & GYNECOLOGY

## 2022-02-14 PROCEDURE — NC001 PR NO CHARGE: Performed by: OBSTETRICS & GYNECOLOGY

## 2022-02-14 PROCEDURE — 82950 GLUCOSE TEST: CPT | Performed by: OBSTETRICS & GYNECOLOGY

## 2022-02-14 PROCEDURE — 99214 OFFICE O/P EST MOD 30 MIN: CPT

## 2022-02-14 PROCEDURE — 85025 COMPLETE CBC W/AUTO DIFF WBC: CPT

## 2022-02-14 PROCEDURE — 86592 SYPHILIS TEST NON-TREP QUAL: CPT | Performed by: OBSTETRICS & GYNECOLOGY

## 2022-02-14 PROCEDURE — 87086 URINE CULTURE/COLONY COUNT: CPT

## 2022-02-14 PROCEDURE — 86850 RBC ANTIBODY SCREEN: CPT | Performed by: OBSTETRICS & GYNECOLOGY

## 2022-02-14 PROCEDURE — 80053 COMPREHEN METABOLIC PANEL: CPT

## 2022-02-14 PROCEDURE — 84443 ASSAY THYROID STIM HORMONE: CPT | Performed by: OBSTETRICS & GYNECOLOGY

## 2022-02-14 PROCEDURE — 76817 TRANSVAGINAL US OBSTETRIC: CPT | Performed by: OBSTETRICS & GYNECOLOGY

## 2022-02-14 PROCEDURE — 86901 BLOOD TYPING SEROLOGIC RH(D): CPT | Performed by: OBSTETRICS & GYNECOLOGY

## 2022-02-14 PROCEDURE — 86900 BLOOD TYPING SEROLOGIC ABO: CPT | Performed by: OBSTETRICS & GYNECOLOGY

## 2022-02-14 RX ORDER — ONDANSETRON 2 MG/ML
4 INJECTION INTRAMUSCULAR; INTRAVENOUS EVERY 6 HOURS PRN
Status: DISCONTINUED | OUTPATIENT
Start: 2022-02-14 | End: 2022-02-14 | Stop reason: HOSPADM

## 2022-02-14 RX ORDER — ACETAMINOPHEN 325 MG/1
975 TABLET ORAL EVERY 6 HOURS PRN
Status: DISCONTINUED | OUTPATIENT
Start: 2022-02-14 | End: 2022-02-14 | Stop reason: HOSPADM

## 2022-02-14 RX ORDER — FLUOXETINE HYDROCHLORIDE 20 MG/1
20 CAPSULE ORAL DAILY
Status: DISCONTINUED | OUTPATIENT
Start: 2022-02-14 | End: 2022-02-14 | Stop reason: HOSPADM

## 2022-02-14 RX ORDER — BETAMETHASONE SODIUM PHOSPHATE AND BETAMETHASONE ACETATE 3; 3 MG/ML; MG/ML
12 INJECTION, SUSPENSION INTRA-ARTICULAR; INTRALESIONAL; INTRAMUSCULAR; SOFT TISSUE EVERY 24 HOURS
Status: DISCONTINUED | OUTPATIENT
Start: 2022-02-14 | End: 2022-02-14 | Stop reason: HOSPADM

## 2022-02-14 RX ADMIN — SODIUM CHLORIDE, SODIUM LACTATE, POTASSIUM CHLORIDE, AND CALCIUM CHLORIDE 1000 ML: .6; .31; .03; .02 INJECTION, SOLUTION INTRAVENOUS at 01:57

## 2022-02-14 RX ADMIN — FLUOXETINE 20 MG: 20 CAPSULE ORAL at 08:16

## 2022-02-14 RX ADMIN — ACETAMINOPHEN 975 MG: 325 TABLET, FILM COATED ORAL at 01:20

## 2022-02-14 RX ADMIN — BETAMETHASONE SODIUM PHOSPHATE AND BETAMETHASONE ACETATE 12 MG: 3; 3 INJECTION, SUSPENSION INTRA-ARTICULAR; INTRALESIONAL; INTRAMUSCULAR at 08:41

## 2022-02-14 NOTE — UTILIZATION REVIEW
Initial Clinical Review    Admission: Date/Time/Statement:   Admission Orders (From admission, onward)     Ordered        22 0124  Place in Observation  Once                      Orders Placed This Encounter   Procedures    Place in Observation     Standing Status:   Standing     Number of Occurrences:   1     Order Specific Question:   Level of Care     Answer:   Med Surg [16]     ED Arrival Information     Expected Arrival Acuity    2022 22:28 -         Means of arrival Escorted by Service Admission type    Walk-In Family Member OB/GYN Elective         Arrival complaint    24 wks preg/possible miscarriage         Chief Complaint   Patient presents with    Blood in Urine     patient's urine visible dark    Back Pain     feels like pulling on the left side        Initial Presentation:  32 y o   female  at 24w3d weeks gestation admit Observation in the setting of a shortened cervix, nephrolitiasis  Cervical shortening First diagnosed at her anatomy scan on 22, with cervical length of 2 3cm prescribed & taking  vaginal progesterone  Repeat ultrasound 22 showed stable cervical length of 2 38cm  She reports doing well since that ultrasound, until last night () w/ abrupt onset of sharp left lower quadrant pain  It did not radiate, did not come and go, not associated with other symptoms  Tylenol reduced the pain enough for her to sleep  Currently reports dull ache in left lower quadr to left flank different from previous HX from nephrolithiasis  Additionally this evening reports urine w bright red blood in toilet  In TRIAGE exam Cervix shortened to 1 43 CM, (Cervix: 2/60/-2, CL 1 43)  Plan continuous toco intermittent FHM, MFM, infectious workup w UA, UCX  Date:   MFM Cerclage not appropriate in this scenario; cervical length 16 mm; rec BTM, dispo on eval of outcomes from today; cont vaginal Progesterone, currently hold off on IV MAG   Tobacco cessation  examination today she is well-appearing and in no acute distress  Her abdomen is soft and nontender throughout      Fetal data:  Nonstress test: date 02/14/22 Time: 0818 - 0843  Baseline:  140  Variability: moderate  Accelerations: present, 10x10  Decelerations: absent  Contractions: absent  Assessment:  appropriate for gestational age   Triage Vitals   Temperature Pulse Respirations Blood Pressure SpO2   02/13/22 2219 02/13/22 2219 02/13/22 2219 02/13/22 2219 02/13/22 2219   98 2 °F (36 8 °C) 96 20 133/89 100 %      Temp Source Heart Rate Source Patient Position - Orthostatic VS BP Location FiO2 (%)   02/13/22 2219 02/13/22 2245 02/13/22 2245 02/13/22 2245 --   Oral Monitor Lying Right arm       Pain Score       02/13/22 2245       5          Wt Readings from Last 1 Encounters:   02/13/22 96 6 kg (213 lb)     Additional Vital Signs:    Date/Time Temp Pulse Resp BP SpO2 O2 Device Cardiac (WDL) Patient Position - Orthostatic VS   02/14/22 0843 98 3 °F (36 8 °C) 88 18 109/59 -- None (Room air) WDL --   02/14/22 0505 -- -- -- -- -- -- -- --   02/14/22 0420 97 6 °F (36 4 °C) 73 18 110/66 98 % None (Room air) -- Lying   02/14/22 0232 98 2 °F (36 8 °C) 78 18 117/70 98 % -- WDL Lying   02/13/22 2300 -- -- -- 119/72 -- -- -- --   02/13/22 2245 98 3 °F (36 8 °C) 97 18 119/72 97 % -- -- Lying   02/13/22 2219 98 2 °F (36 8 °C) 96 20 133/89 100 % -- -- --    Comment rows:   OBSERV: administered 50 g Glucola beverage at this time at 02/14/22 0505   OBSERV: pt reports good fm, denies ctx/pressure, denies vaginal bleeding/leakage  no blood during last void in triage   Pt is educated to report any changes of such at 02/14/22 0232      Weights (last 14 days)    Date/Time Weight Height   02/13/22 2245 96 6 kg (213 lb) 5' 6" (1 676 m)       Pertinent Labs/Diagnostic Test Results:       Results from last 7 days   Lab Units 02/14/22  0152   WBC Thousand/uL 9 58   HEMOGLOBIN g/dL 12 3   HEMATOCRIT % 36 3   PLATELETS Thousands/uL 130*   NEUTROS ABS Thousands/µL 6 50         Results from last 7 days   Lab Units 02/14/22  0152   SODIUM mmol/L 135*   POTASSIUM mmol/L 3 7   CHLORIDE mmol/L 103   CO2 mmol/L 24   ANION GAP mmol/L 8   BUN mg/dL 9   CREATININE mg/dL 0 46*   EGFR ml/min/1 73sq m 132   CALCIUM mg/dL 8 4     Results from last 7 days   Lab Units 02/14/22  0152   AST U/L 13   ALT U/L 18   ALK PHOS U/L 61   TOTAL PROTEIN g/dL 6 8   ALBUMIN g/dL 3 1*   TOTAL BILIRUBIN mg/dL 0 17*         Results from last 7 days   Lab Units 02/14/22  0152   GLUCOSE RANDOM mg/dL 84             No results found for: BETA-HYDROXYBUTYRATE                               Results from last 7 days   Lab Units 02/14/22  0152   TSH 3RD GENERATON uIU/mL 0 327*                                                 Results from last 7 days   Lab Units 02/14/22  0036   CLARITY UA  Slightly Cloudy   COLOR UA  Bloody   SPEC GRAV UA  1 025   PH UA  7 0   GLUCOSE UA mg/dl Negative   KETONES UA mg/dl Negative   BLOOD UA  Large*   PROTEIN UA mg/dl 30 (1+)*   NITRITE UA  Negative   BILIRUBIN UA  Negative   UROBILINOGEN UA E U /dl 0 2   LEUKOCYTES UA  Negative   WBC UA /hpf 1-2   RBC UA /hpf Innumerable*   BACTERIA UA /hpf Occasional   EPITHELIAL CELLS WET PREP /hpf Occasional   MUCUS THREADS  Occasional*         ED Treatment:   Medication Administration from 02/13/2022 2213 to 02/14/2022 1118       Date/Time Order Dose Route Action     02/14/2022 0229 lactated ringers bolus 1,000 mL   Intravenous Rate/Dose Change     02/14/2022 0157 lactated ringers bolus 1,000 mL 1,000 mL Intravenous New Bag     02/14/2022 0120 acetaminophen (TYLENOL) tablet 975 mg 975 mg Oral Given     02/14/2022 0816 FLUoxetine (PROzac) capsule 20 mg 20 mg Oral Given     02/14/2022 0841 betamethasone acetate-betamethasone sodium phosphate (CELESTONE) injection 12 mg 12 mg Intramuscular Given        Past Medical History:   Diagnosis Date    Abnormal Pap smear of cervix     Depression     Kidney stone     Seasonal allergies Present on Admission:   Cervical shortening, second trimester   Obesity affecting pregnancy in second trimester   Tobacco smoking affecting pregnancy in second trimester      Admitting Diagnosis: Back pain [M54 9]  24 weeks gestation of pregnancy [Z3A 24]  Age/Sex: 32 y o  female  Admission Orders:  Continuous external uterine contraction & fetal HR monitoring  Scheduled Medications:  betamethasone acetate-betamethasone sodium phosphate, 12 mg, Intramuscular, Q24H  FLUoxetine, 20 mg, Oral, Daily      Continuous IV Infusions:     PRN Meds:  acetaminophen, 975 mg, Oral, Q6H PRN  ondansetron, 4 mg, Intravenous, Q6H PRN        IP CONSULT TO PERINATOLOGY    Network Utilization Review Department  ATTENTION: Please call with any questions or concerns to [phone] and carefully listen to the prompts so that you are directed to the right person  All voicemails are confidential   Jayy Alaniz all requests for admission clinical reviews, approved or denied determinations and any other requests to dedicated fax number below belonging to the campus where the patient is receiving treatment   List of dedicated fax numbers for the Facilities:  [address] DENIALS (Administrative/Medical Necessity) [phone]   [address] (Maternity/NICU/Pediatrics) [phone]   [address] [place] [address]  [phone]   [address] [facility] [place] [address] [id number] [place] [address] [address]eli [address] [phone]

## 2022-02-14 NOTE — ASSESSMENT & PLAN NOTE
Known short cervix on vaginal progesterone  Anatomy scan on 1/14/22:cervical length of 2 3cm     2/1/22: cervical length of 2 38cm  Triage 2/14/22: Cervical length 1 43cm    Reports in prior pregnancy had painless dilation at term of 5 5cm    Consider interval repeat imaging later today  Consider steroids and magnesium at that time pending stability of exam

## 2022-02-14 NOTE — DISCHARGE INSTRUCTIONS
Abdominal Pain in Pregnancy   WHAT YOU NEED TO KNOW:   What do I need to know about abdominal pain during pregnancy? Abdominal pain during pregnancy is common  Some causes include heartburn, constipation, gas, false labor, and round ligament pain  Round ligament pain is caused by stretching of the ligaments that support your uterus  Abdominal pain may be caused by a health problem, such as a stomach virus or appendicitis (inflammation of the appendix)  The pain may also be caused by a problem with your pregnancy, such as a threatened miscarriage or  labor  How is the cause of pain during pregnancy diagnosed? Your healthcare provider will examine you and ask you about the pain  You may also need any of the following:  · Blood tests  may be done to check for inflammation, liver function, blood cell levels, or get information about your overall health  · Ultrasound  pictures may be used to check the organs inside your abdomen, including your uterus  Your baby may also be checked  · MRI  pictures may be used if ultrasound pictures do not show a clear cause for your pain  Do not enter the MRI room with anything metal  Metal can cause serious injury  Tell the healthcare provider if you have any metal in or on your body  How is abdominal pain during pregnancy treated? Treatment will depend on the cause of your pain  Ask your healthcare provider before you take any medicine during pregnancy, including over-the-counter pain medicines  Acetaminophen may be recommended  Ask how much to take and how often to take it  Follow directions  Acetaminophen can cause liver damage if not taken correctly  Do not use more than 4 grams (4,000 milligrams) total of acetaminophen in 1 day  Acetaminophen can cause liver damage  Your provider will tell you how much is safe to take each day during pregnancy  Too much medicine can be harmful to your baby   Read the labels of all other medicines you are using to see if they also contain acetaminophen, or ask your doctor or pharmacist   What can I do to manage my symptoms? · Rest as needed  Rest may help to relieve pain  Your healthcare provider may recommend that you rest on your side instead of on your back  He or she may tell you to lie on your left side, if possible  Place a pillow under your abdomen  Keep another pillow between your knees  Ask your provider about other ways to relieve this pain, such as a supportive belt or pregnancy exercises  · Do not lie flat in bed or bend over if you have heartburn  Ask your obstetrician if you should make any changes to the foods you eat  Ask if you can take any medicines for heartburn  · Move slowly  Avoid quick changes in position or movements that cause pain  · Exercise as directed  Gentle exercise can keep the ligaments loose and strengthen core (abdominal) muscles  An example is swimming, or a yoga program designed for pregnancy  Ask your healthcare provider which exercises are safe for you and how often to exercise  For most healthy women, a good goal is to try to get at least 30 minutes of exercise every day  If activity causes pain, try not to walk too long or too far at one time  Break your exercise up into short amounts  · Apply a warm compress to the area  Warmth can relieve pain and muscle spasms  Ask your healthcare provider if you can take a warm bath or use a heating pad  Keep all heat settings low  High heat can be dangerous for your baby  Do not sit in a hot tub or use hot water in your bath  You may also be able to massage the area gently while you are applying heat  Massage can help relieve pain  · Eat more fiber and drink more liquids to relieve constipation  Fiber is found in fruits, vegetables, and whole-grain foods, such as whole-wheat bread and cereals  Ask how much liquid to drink each day and which liquids are best for you         Call your local emergency number (911 in the [address]) if:   · You have a fast heartbeat  · You have shortness of breath  · You feel lightheaded or faint  When should I seek immediate care? · You have sudden, severe pain or cramps that are so bad that you cannot walk or talk  · You have vaginal bleeding or discharge  · You have nausea, vomiting, fever, and severe pain on your right side  When should I call my obstetrician? · You have light vaginal bleeding or spotting  · You continue to have abdominal pain that cannot be relieved  · You have a fever  · You have questions or concerns about your condition or care  CARE AGREEMENT:   You have the right to help plan your care  Learn about your health condition and how it may be treated  Discuss treatment options with your healthcare providers to decide what care you want to receive  You always have the right to refuse treatment  The above information is an  only  It is not intended as medical advice for individual conditions or treatments  Talk to your doctor, nurse or pharmacist before following any medical regimen to see if it is safe and effective for you  © Copyright [employer] 2021 Information is for End User's use only and may not be sold, redistributed or otherwise used for commercial purposes   All illustrations and images included in CareNotes® are the copyrighted property of A D A M , Inc  or [address] [place] [address] [employer]

## 2022-02-14 NOTE — QUICK NOTE
Reviewed renal ultrasound images, possible echogenic material in R renal pelves; no read yet      Patient Vitals for the past 24 hrs:   BP Temp Temp src Pulse Resp SpO2 Height Weight   02/14/22 1611 109/58 98 8 °F (37 1 °C) Oral 83 18 94 % -- --   02/14/22 1203 108/61 98 °F (36 7 °C) Oral 86 18 95 % -- --   02/14/22 0843 109/59 98 3 °F (36 8 °C) Oral 88 18 -- -- --   02/14/22 0420 110/66 97 6 °F (36 4 °C) Oral 73 18 98 % -- --   02/14/22 0232 117/70 98 2 °F (36 8 °C) Oral 78 18 98 % -- --   02/13/22 2300 119/72 -- -- -- -- -- -- --   02/13/22 2245 119/72 98 3 °F (36 8 °C) Oral 97 18 97 % 5' 6" (1 676 m) 96 6 kg (213 lb)   02/13/22 2219 133/89 98 2 °F (36 8 °C) Oral 96 20 100 % -- --     Fetal data:  Nonstress test: date 02/14/22 Time: 1602 - 1630  Baseline: 140 then 135; discontinuity of tracing [id number]  Variability: moderate  Accelerations: present, 10x10  Decelerations: absent  Contractions: absent  Assessment: appropriate for gestational age  Plan: continue BID and PRN     Christos Pearce MD  Attending Physician, [place]

## 2022-02-14 NOTE — PLAN OF CARE
Problem: PAIN - ADULT  Goal: Verbalizes/displays adequate comfort level or baseline comfort level  Description: Interventions:  - Encourage patient to monitor pain and request assistance  - Assess pain using appropriate pain scale  - Administer analgesics based on type and severity of pain and evaluate response  - Implement non-pharmacological measures as appropriate and evaluate response  - Consider cultural and social influences on pain and pain management  - Notify physician/advanced practitioner if interventions unsuccessful or patient reports new pain  Outcome: Progressing     Problem: INFECTION - ADULT  Goal: Absence or prevention of progression during hospitalization  Description: INTERVENTIONS:  - Assess and monitor for signs and symptoms of infection  - Monitor lab/diagnostic results  - Monitor all insertion sites, i e  indwelling lines, tubes, and drains  - Monitor endotracheal if appropriate and nasal secretions for changes in amount and color  - [place] appropriate cooling/warming therapies per order  - Administer medications as ordered  - Instruct and encourage patient and family to use good hand hygiene technique  - Identify and instruct in appropriate isolation precautions for identified infection/condition  Outcome: Progressing  Goal: Absence of fever/infection during neutropenic period  Description: INTERVENTIONS:  - Monitor WBC    Outcome: Progressing     Problem: SAFETY ADULT  Goal: Patient will remain free of falls  Description: INTERVENTIONS:  - Educate patient/family on patient safety including physical limitations  - Instruct patient to call for assistance with activity   - Consult OT/PT to assist with strengthening/mobility   - Keep Call bell within reach  - Keep bed low and locked with side rails adjusted as appropriate  - Keep care items and personal belongings within reach  - Initiate and maintain comfort rounds  - Apply yellow socks and bracelet for high fall risk patients  - Consider moving patient to room near nurses station  Outcome: Progressing  Goal: Maintain or return to baseline ADL function  Description: INTERVENTIONS:  -  Assess patient's ability to carry out ADLs; assess patient's baseline for ADL function and identify physical deficits which impact ability to perform ADLs (bathing, care of mouth/teeth, toileting, grooming, dressing, etc )  - Assess/evaluate cause of self-care deficits   - Assess range of motion  - Assess patient's mobility; develop plan if impaired  - Assess patient's need for assistive devices and provide as appropriate  - Encourage maximum independence but intervene and supervise when necessary  - Involve family in performance of ADLs  - Assess for home care needs following discharge   - Consider OT consult to assist with ADL evaluation and planning for discharge  - Provide patient education as appropriate  Outcome: Progressing  Goal: Maintains/Returns to pre admission functional level  Description: INTERVENTIONS:  - Perform BMAT or MOVE assessment daily    - Set and communicate daily mobility goal to care team and patient/family/caregiver  - Collaborate with rehabilitation services on mobility goals if consulted  - Record patient progress and toleration of activity level   Outcome: Progressing     Problem: Knowledge Deficit  Goal: Patient/family/caregiver demonstrates understanding of disease process, treatment plan, medications, and discharge instructions  Description: Complete learning assessment and assess knowledge base    Interventions:  - Provide teaching at level of understanding  - Provide teaching via preferred learning methods  Outcome: Progressing     Problem: DISCHARGE PLANNING  Goal: Discharge to home or other facility with appropriate resources  Description: INTERVENTIONS:  - Identify barriers to discharge w/patient and caregiver  - Arrange for needed discharge resources and transportation as appropriate  - Identify discharge learning needs (meds, wound care, etc )  - Arrange for interpretive services to assist at discharge as needed  - Refer to Case Management Department for coordinating discharge planning if the patient needs post-hospital services based on physician/advanced practitioner order or complex needs related to functional status, cognitive ability, or social support system  Outcome: Progressing     Problem: ANTEPARTUM  Goal: Maintain pregnancy as long as maternal and/or fetal condition is stable  Description: INTERVENTIONS:  - Maternal surveillance  - Fetal surveillance  - Monitor uterine activity  - Medications as ordered  - Bedrest  Outcome: Progressing

## 2022-02-14 NOTE — CONSULTS
Consultation - Maternal Fetal Medicine   Evelia Cordero 32 y o  female MRN: [id number]  Unit/Bed#: LD TRIAGE 3-01 Encounter: [id number]  Admit date: 2022  Today's date: 22    Assessment/Plan   Ms Mariana Conrad is a 32y o  year-old  at [address] [place] [address] Day: 1, admitted for shortened cervix in   By issue:    Cervical shortening, second trimester  Assessment & Plan  Known short cervix on vaginal progesterone  Anatomy scan on 22:cervical length of 2 3cm  22: cervical length of 2 38cm  Triage 22: Cervical length 1 43cm    Reports in prior pregnancy had painless dilation at term of 5 5cm    Consider interval repeat imaging later today  Consider steroids and magnesium at that time pending stability of exam    Tobacco smoking affecting pregnancy in second trimester  Assessment & Plan  Cutting down on tobacco use, most recently 5cig/day    Obesity affecting pregnancy in second trimester  Assessment & Plan  TWG 18lbs    * 24 weeks gestation of pregnancy  Assessment & Plan  O positive  Due for 28 week labs  Due for TDAP at 28 weeks         Chief Complaint   Patient presents with    Blood in Urine     patient's urine visible dark    Back Pain     feels like pulling on the left side        Physician Requesting Consult: Dr Luis Armando Yu  Reason for Consult / Principal Problem:  cervical shortening and dilation  Subspeciality: Perinatology    Dear Dr Luis Armando Yu,    Thank you for referring patient Nidia Tabares for Maternal-Fetal Medicine consultation regarding  cervical dilation  HPI: As you know, Ms Mariana Conrad is a 32y o  year-old  with an LILI of Estimated Date of Delivery: 6/3/22 at [address] [place] [address] Day: 2, admitted for  cervical dilation  She has a known history of shortened cervix and is on vaginal progesterone  This was diagnosed at her anatomy scan on 22, with cervical length of 2 3cm  Repeat ultrasound 22 showed stable cervical length of 2 38cm   She reports doing well since that ultrasound, until last night (2/12) when she noted onset of sharp left lower quadrant pain  It did not radiate, did not come and go, and was not associated with other symptoms  She took some tylenol, and the pain abated, and she was able to sleep  She notes that the pain is now noticeable as a dull ache but not significantly bothersome, and is left lower quadrant to left flank  Pain does not come and go and does not fluctuate  This feels different than pain she has previously had from nephrolithiasis  This evening, when urinating, she noted that her urine was mixed with bright red blood in the toilet bowl  She did not notice any vaginal bleeding or vaginal discharge, without any blood in vagina when wiping  Given new concerning symptom, she presented for evaluation  On triage evaluation, cervical length now noted to have shortened to 1 43cm  She was admitted for observation, with consideration for interval repetition of cervical length screening  Her current pregnancy is significant for known short cervix, tobacco use, obesity (BMI 34), depression on Prozac, subclinical hypothyroidism, and treatment for right side sciatic pain  She has a history of nephrolithiasis outside of pregnancy    Her obstetrical history is significant for prior term vaginal delivery in 2013, induced at 41 weeks for post-dates  Pregnancy was complicated by bleeding at 11 weeks which resolved  She reports that in her prior pregnancy, when she was checked at term she was 5 5cm dilated  When she came in for her induction, she was 6cm dilated  After 12 hour induction, she had uncomplicated delivery  Other obstetric review of symptoms:  Contractions: None  Leakage of fluid: None  Bleeding: None  Fetal movement: present  Review of Systems   Constitutional: Negative for chills, fatigue and fever  Eyes: Negative for visual disturbance  Respiratory: Negative for cough and shortness of breath  Cardiovascular: Negative for chest pain, palpitations and leg swelling  Gastrointestinal: Negative for abdominal distention, constipation, diarrhea, nausea and vomiting  Genitourinary: Positive for flank pain (left) and hematuria  Negative for difficulty urinating, dysuria, frequency, vaginal bleeding, vaginal discharge and vaginal pain  Skin: Negative for pallor and rash  Neurological: Negative for dizziness, syncope, weakness, light-headedness and headaches  Other history is as follows:    Historical Information   OB History    Para Term  AB Living   2 1 1     1   SAB IAB Ectopic Multiple Live Births           1      # Outcome Date GA Lbr Kashif/2nd Weight Sex Delivery Anes PTL Lv   2 Current            1 Term 10/10/13 41w2d  3771 g (8 lb 5 oz) F Vag-Spont EPI N EUGENIO      Obstetric Comments   Bleeding at 11 wks during her pregnancy   Post dates     Gynecologic history: Patient's last menstrual period was 2021 (exact date)       Past Medical History:   Diagnosis Date    Abnormal Pap smear of cervix     Depression     Kidney stone     Seasonal allergies      Past Surgical History:   Procedure Laterality Date    IR IMAGE GUIDED ASPIRATION / DRAINAGE  3/22/2019    RI BREAST REDUCTION Bilateral 3/12/2019    Procedure: REDUCTION MAMMOPLASTY BREAST;  Surgeon: Freddie Hirsch MD;  [facility];  Service: [address] [place] [address] Road    as a child to remove abcess; cyst removal    WISDOM TOOTH EXTRACTION      3/4     Social History   Social History     Substance and Sexual Activity   Alcohol Use Not Currently    Comment: socially     Social History     Substance and Sexual Activity   Drug Use No     Social History     Tobacco Use   Smoking Status Current Every Day Smoker    Packs/day: 0 25    Years: 11 00    Pack years: 2 75    Types: Cigarettes   Smokeless Tobacco Never Used   Tobacco Comment     smoked since age 15- per [place]     Family History: non-contributory    Meds/Allergies   Prior to Admission Medications   Prescriptions Last Dose Informant Patient Reported? Taking? FLUoxetine (PROzac) 20 mg capsule 2/12/2022 at Unknown time Self Yes Yes   Prenatal Vit-DSS-Fe Fum-FA (Prenatal 19) tablet 2/12/2022 at Unknown time Self Yes Yes   Sig: Take 1 tablet by mouth daily    aspirin (ECOTRIN LOW STRENGTH) 81 mg EC tablet 2/12/2022 at Unknown time Self Yes Yes   Sig: Take 81 mg by mouth daily   progesterone 200 mg vaginal suppository 2/12/2022 at Unknown time  Yes Yes   Sig: Insert 100 mg into the vagina daily at bedtime      Facility-Administered Medications: None     Medication Administration - last 24 hours from 02/13/2022 0126 to 02/14/2022 0126       Date/Time Order Dose Route Action Action by     02/14/2022 0120 acetaminophen (TYLENOL) tablet 975 mg 975 mg Oral Given Ashley Pineda RN        Current Facility-Administered Medications   Medication Dose Route Frequency    acetaminophen (TYLENOL) tablet 975 mg  975 mg Oral Q6H PRN    lactated ringers bolus 1,000 mL  1,000 mL Intravenous Once    ondansetron (ZOFRAN) injection 4 mg  4 mg Intravenous Q6H PRN     Allergies   Allergen Reactions    Medical Tape Rash and Blisters       Objective    Patient Vitals for the past 24 hrs:   BP Temp Temp src Pulse Resp SpO2 Height Weight   02/13/22 2300 119/72 -- -- -- -- -- -- --   02/13/22 2245 119/72 98 3 °F (36 8 °C) Oral 97 18 97 % 5' 6" (1 676 m) 96 6 kg (213 lb)   02/13/22 2219 133/89 98 2 °F (36 8 °C) Oral 96 20 100 % -- --     Vitals: Blood pressure 119/72, pulse 97, temperature 98 3 °F (36 8 °C), temperature source Oral, resp  rate 18, height 5' 6" (1 676 m), weight 96 6 kg (213 lb), last menstrual period 08/27/2021, SpO2 97 %, not currently breastfeeding  Body mass index is 34 38 kg/m²  Physical Exam  Constitutional:       General: She is not in acute distress  Appearance: She is normal weight  She is not toxic-appearing     HENT:      Head: Normocephalic and atraumatic  Eyes:      General: No scleral icterus  Conjunctiva/sclera: Conjunctivae normal    Cardiovascular:      Rate and Rhythm: Normal rate  Pulses: Normal pulses  Pulmonary:      Effort: Pulmonary effort is normal  No respiratory distress  Abdominal:      General: There is no distension  Palpations: Abdomen is soft  Tenderness: There is no abdominal tenderness  There is no right CVA tenderness, left CVA tenderness, guarding or rebound  Comments: gravid   Musculoskeletal:         General: Normal range of motion  Cervical back: Normal range of motion  Skin:     General: Skin is warm and dry  Neurological:      General: No focal deficit present  Mental Status: She is alert  Mental status is at baseline  Psychiatric:         Mood and Affect: Mood normal          Behavior: Behavior normal        SVE by Dr Demetrio Mccloud: 2/60/-2, anterior, soft    TVUS by Dr Demetrio Mccloud: Cervical length 1 43cm    Prenatal Labs:   Blood Type: O positive 11/18/21   HCT/HGB: 13 7/40 5 11/18/21     Platelets: 443 40/09/67   1 hour Glucola: No results found for: GLUCOLA,    Rubella: Immune 11/18/21  VDRL/RPR: Non-reactive 11/18/21  Hep B: Non-reactive 11/18/21  HIV: Non reactive 11/18/21   Group B Strep:  No results found for: [facility]            Results for Marley Layne (MRN [id number]) as of 2/14/2022 02:15   Ref   Range 2/14/2022 00:36   Color, UA Unknown Bloody   Clarity, UA Unknown Slightly Cloudy   SL AMB SPECIFIC GRAVITY_URINE Latest Ref Range: 1 003 - 1 030  1 025   Glucose, UA Latest Ref Range: Negative mg/dl Negative   Ketones, UA Latest Ref Range: Negative mg/dl Negative   Blood, UA Latest Ref Range: Negative  Large (A)   Nitrite, UA Latest Ref Range: Negative  Negative   Leukocytes, UA Latest Ref Range: Negative  Negative   pH, UA Latest Ref Range: 4 5 - 8 0  7 0   POCT URINE PROTEIN Latest Ref Range: Negative mg/dl 30 (1+) (A)   Bilirubin, UA Latest Ref Range: Negative Negative   SL AMB POCT UROBILINOGEN Latest Ref Range: 0 2, 1 0 E U /dl E U /dl 0 2   RBC, UA Latest Ref Range: None Seen, 0-1, 1-2, 2-4, 0-5 /hpf Innumerable (A)   WBC, UA Latest Ref Range: None Seen, 0-1, 1-2, 0-5, 2-4 /hpf 1-2   Bacteria, UA Latest Ref Range: None Seen, Occasional /hpf Occasional       Fetal data:  Nonstress test: date 02/14/22 Time: 0100 - 0130  Baseline: 140  Variability: moderate  Accelerations: present, 10x10  Decelerations: absent  Contractions: absent  Assessment: reactive  Plan: Continuous    MFM ultrasound report key findings: 2/1/22 at 22w4d  CERVICAL EVALUATION     SUPINE  Cervical Length: 2 38 cm     OTHER TEST RESULTS  Funneling?: No             Dynamic Changes?: No  Resp   To TFP?: No    Discussed with Dr Tierra Bhakta MD  2/14/2022  1:26 AM

## 2022-02-14 NOTE — PROCEDURES
Tae [place], shayy  at 24w3d with an LILI of 6/3/2022, by Last Menstrual Period, was seen at [address] for the following procedure(s): $Procedure Type: US - Transvaginal]                   Ultrasound Other  Fetal Presentation: Breech  Cervical Length: 1 43  Funnel: No  Debris: No  Placenta Previa: No  Vasa Previa: No                     Almashayy New MD  22  2:52 AM

## 2022-02-14 NOTE — H&P
History & Physical - OB/GYN   Jorge Cordero 32 y o  female MRN: [id number]  Unit/Bed#: -01 Encounter: [id number]    Jah Hurd is a patient of [facility]    Chief complaint:  "left sided pelvic pain"    HPI:  Jah Hurd is a 32 y o   female with an LILI of 6/3/2022, by Last Menstrual Period at 24w3d weeks gestation who is being admitted for Observation in the setting of a shortened cervix  This was first diagnosed at her anatomy scan on 22, with cervical length of 2 3cm and she has been on vaginal progesterone  Repeat ultrasound 22 showed stable cervical length of 2 38cm  She reports doing well since that ultrasound, until last night () when she noted onset of sharp left lower quadrant pain  It did not radiate, did not come and go, and was not associated with other symptoms  Tylenol reduced the pain enough for her to sleep  She notes that the pain is now a dull ache and is left lower quadrant to left flank  This feels different than pain she has previously had from nephrolithiasis  This evening, when urinating, she noted that her urine was mixed with bright red blood in the toilet bowl  Given new concerning symptom, she presented for evaluation  On triage evaluation, cervical length now noted to have shortened to 1 43cm  She was admitted for observation, with consideration for interval repetition of cervical length screening  Contractions:  no  Fetal movement:  yes  Vaginal bleeding:   no  Leaking of fluid:  no    Pregnancy Complications:  known short cervix, tobacco use, obesity (BMI 34), depression on Prozac, subclinical hypothyroidism, and treatment for right side sciatic pain   She has a history of nephrolithiasis outside of pregnancy    PMH:  Past Medical History:   Diagnosis Date    Abnormal Pap smear of cervix     Depression     Kidney stone     Seasonal allergies        PSH:  Past Surgical History:   Procedure Laterality Date    IR IMAGE GUIDED ASPIRATION / DRAINAGE  3/22/2019    MS BREAST REDUCTION Bilateral 3/12/2019    Procedure: REDUCTION MAMMOPLASTY BREAST;  Surgeon: Terri Hirsch MD;  [facility];  Service:  [facility] Road    as a child to remove abcess; cyst removal    WISDOM TOOTH EXTRACTION      3/4       Social Hx:  Denies EtOH and recreational drug use in pregnancy  Patient does smoke tobacco, has cut down to 5 cigarettes/day    OB Hx:  OB History    Para Term  AB Living   2 1 1 0 0 1   SAB IAB Ectopic Multiple Live Births   0 0 0 0 1      # Outcome Date GA Lbr Kashif/2nd Weight Sex Delivery Anes PTL Lv   2             1 Term 10/10/13 41w2d  3771 g (8 lb 5 oz) F Vag-Spont EPI N EUGENIO      Obstetric Comments   Bleeding at 11 wks during her pregnancy   Post dates       Meds:  No current facility-administered medications on file prior to encounter  Current Outpatient Medications on File Prior to Encounter   Medication Sig Dispense Refill    aspirin (ECOTRIN LOW STRENGTH) 81 mg EC tablet Take 81 mg by mouth daily      FLUoxetine (PROzac) 20 mg capsule       Prenatal Vit-DSS-Fe Fum-FA (Prenatal 19) tablet Take 1 tablet by mouth daily       progesterone 200 mg vaginal suppository Insert 100 mg into the vagina daily at bedtime         Allergies: Allergies   Allergen Reactions    Medical Tape Rash and Blisters     Review of Systems   Constitutional: Negative for chills, fatigue and fever  Eyes: Negative for visual disturbance  Respiratory: Negative for cough and shortness of breath  Cardiovascular: Negative for chest pain, palpitations and leg swelling  Gastrointestinal: Negative for abdominal distention, constipation, diarrhea, nausea and vomiting  Genitourinary: Positive for flank pain (left) and hematuria  Negative for difficulty urinating, dysuria, frequency, vaginal bleeding, vaginal discharge and vaginal pain  Skin: Negative for pallor and rash     Neurological: Negative for dizziness, syncope, weakness, light-headedness and headaches  Physical Exam  Vitals reviewed  Exam conducted with a chaperone present  Constitutional:       Appearance: Normal appearance  Eyes:      Extraocular Movements: Extraocular movements intact  Conjunctiva/sclera: Conjunctivae normal       Pupils: Pupils are equal, round, and reactive to light  Cardiovascular:      Rate and Rhythm: Normal rate and regular rhythm  Pulses: Normal pulses  Pulmonary:      Effort: Pulmonary effort is normal    Abdominal:      Palpations: Abdomen is soft  Comments: gravid   Genitourinary:     General: Normal vulva  Urethra: No urethral swelling or urethral lesion  Vagina: Normal  No bleeding  Cervix: Normal       Rectum: Normal    Skin:     General: Skin is warm and dry  Neurological:      General: No focal deficit present  Mental Status: She is alert and oriented to person, place, and time  Mental status is at baseline  Cervix: 2/60/-2, CL 1 43       Fetal heart rate:   Baseline Rate: 145 bpm  Variability: Moderate 6-25 bpm  Accelerations: 10 x 10 (<32 weeks)  Decelerations: Variable    [place]:   Contraction Frequency (minutes): none    GBS: unknown    Membranes: intact    Labs: pending      Assessment:   32 y o   at 24w3d admitted for observation of shortened cervix in the setting of new onset hematuria  Plan:   1  IUP at 24w3d   - Intermittent FHR and continous tocometry monitoring  2  Membrane status:    - intact  3   labor assessment   - SSE: cervix visibly closed   - SVE: 2/60/-2   - CL 1 43 on TVUS   - Infection workup    - GBS: unknown    - Urine analysis/culture: pending  4  Routine antepartum labs   - CBC and CMP pending   - due for 28 week labs  5   MFM consult   - placed, repeat CL in AM    D/w Dr Erasmo Stewart MD, PGY-1  2022  2:44 AM

## 2022-02-14 NOTE — ASSESSMENT & PLAN NOTE
Appears chronic, platelets 193 in 1/5796, 148 at start of pregnancy    Lab Results   Component Value Date     (L) 02/14/2022

## 2022-02-15 ENCOUNTER — HOSPITAL ENCOUNTER (OUTPATIENT)
Facility: HOSPITAL | Age: 32
Discharge: HOME | End: 2022-02-15
Attending: OBSTETRICS & GYNECOLOGY | Admitting: OBSTETRICS & GYNECOLOGY
Payer: COMMERCIAL

## 2022-02-15 LAB — BACTERIA UR CULT: NORMAL

## 2022-02-15 PROCEDURE — 96372 THER/PROPH/DIAG INJ SC/IM: CPT

## 2022-02-15 RX ORDER — BETAMETHASONE SODIUM PHOSPHATE AND BETAMETHASONE ACETATE 3; 3 MG/ML; MG/ML
12 INJECTION, SUSPENSION INTRA-ARTICULAR; INTRALESIONAL; INTRAMUSCULAR; SOFT TISSUE ONCE
Status: COMPLETED | OUTPATIENT
Start: 2022-02-15 | End: 2022-02-15

## 2022-02-15 RX ADMIN — BETAMETHASONE SODIUM PHOSPHATE AND BETAMETHASONE ACETATE 12 MG: 3; 3 INJECTION, SUSPENSION INTRA-ARTICULAR; INTRALESIONAL; INTRAMUSCULAR at 13:12

## 2022-02-15 NOTE — UTILIZATION REVIEW
Observation Admission Authorization Request   NOTIFICATION OF OBSERVATION ADMISSION/OBSERVATION AUTHORIZATION REQUEST   SERVICING FACILITY:   [address]  Tax ID: [id number]  NPI: [id number]  Place of Service: On [address] [facility] Code: 22  CPT Code for Observation: CPT   CPT 38370     ATTENDING PROVIDER:  Attending Name and NPI#: Rebeca Ballard Md [[id number]]  Address: [address]  Phone: [phone]     UTILIZATION REVIEW CONTACT:  Rowan Ruiz, Utilization Review Supervisor  Network Utilization Review Department  Phone: [phone]  Fax: [phone]  Email: [email or web address]     PHYSICIAN ADVISORY SERVICES:  FOR BLNJ-LJ-TVWA REVIEW - MEDICAL NECESSITY DENIAL  Phone: [phone]  Fax: [phone]  Email: [email or web address]     TYPE OF REQUEST:  Observation  Status     ADMISSION INFORMATION:  ADMISSION DATE/TIME:   PATIENT DIAGNOSIS CODE/DESCRIPTION:  Back pain [M54 9]  24 weeks gestation of pregnancy [Z3A 24]  DISCHARGE DATE/TIME: 2/14/2022  5:59 PM  DISCHARGE DISPOSITION (IF DISCHARGED): Home/Self Care     IMPORTANT INFORMATION:  Please contact the Rowan Ruiz directly with any questions or concerns regarding this request  Department voicemails are confidential     Send requests for admission clinical reviews, concurrent reviews, approvals, and administrative denials due to lack of clinical to fax [phone]  Notification of Discharge   This is a Notification of Discharge from our facility [address]  Please be advised that this patient has been discharge from our facility  Below you will find the admission and discharge date and time including the patients disposition     UTILIZATION REVIEW CONTACT:  Rowan Ruiz  Utilization   Network Utilization Review Department  Phone: [phone] x carefully listen to the prompts  All voicemails are confidential   Email: [email or web address]     PHYSICIAN ADVISORY SERVICES:  FOR UABG-NR-ZONF REVIEW - MEDICAL NECESSITY DENIAL  Phone: [phone]  Fax: [phone]  Email: [email or web address]     PRESENTATION DATE: 2/13/2022 10:28 PM  OBERVATION ADMISSION DATE:   INPATIENT ADMISSION DATE: N/A N/A   DISCHARGE DATE: 2/14/2022  5:59 PM  DISPOSITION: Home/Self Care Home/Self Care      IMPORTANT INFORMATION:  Send all requests for admission clinical reviews, approved or denied determinations and any other requests to dedicated fax number below belonging to the campus where the patient is receiving treatment   List of dedicated fax numbers:  [address] DENIALS (Administrative/Medical Necessity) [phone]   [address] (Maternity/NICU/Pediatrics) [phone]   Nathaniel [facility] [phone]   [address]   [address] [place] [address] [phone]   [address] [place] [address] [address] [place] [address],[address] [phone]   [facility]  [phone]   [address] [place] [address], [address]  [address] [facility] [address] [facility] [phone]

## 2022-02-21 ENCOUNTER — TELEPHONE (OUTPATIENT)
Dept: PERINATAL CARE | Facility: CLINIC | Age: 32
End: 2022-02-21

## 2022-02-21 ENCOUNTER — ULTRASOUND (OUTPATIENT)
Dept: PERINATAL CARE | Facility: OTHER | Age: 32
End: 2022-02-21
Payer: COMMERCIAL

## 2022-02-21 VITALS
DIASTOLIC BLOOD PRESSURE: 78 MMHG | BODY MASS INDEX: 34.87 KG/M2 | SYSTOLIC BLOOD PRESSURE: 128 MMHG | HEART RATE: 103 BPM | HEIGHT: 66 IN | WEIGHT: 217 LBS

## 2022-02-21 DIAGNOSIS — O26.872 CERVICAL SHORTENING, SECOND TRIMESTER: ICD-10-CM

## 2022-02-21 DIAGNOSIS — O99.212 OBESITY AFFECTING PREGNANCY IN SECOND TRIMESTER: ICD-10-CM

## 2022-02-21 DIAGNOSIS — Z3A.25 25 WEEKS GESTATION OF PREGNANCY: Primary | ICD-10-CM

## 2022-02-21 DIAGNOSIS — O99.332 TOBACCO SMOKING AFFECTING PREGNANCY IN SECOND TRIMESTER: ICD-10-CM

## 2022-02-21 PROCEDURE — 4004F PT TOBACCO SCREEN RCVD TLK: CPT | Performed by: OBSTETRICS & GYNECOLOGY

## 2022-02-21 PROCEDURE — 76816 OB US FOLLOW-UP PER FETUS: CPT | Performed by: OBSTETRICS & GYNECOLOGY

## 2022-02-21 PROCEDURE — 99213 OFFICE O/P EST LOW 20 MIN: CPT | Performed by: OBSTETRICS & GYNECOLOGY

## 2022-02-21 PROCEDURE — 3008F BODY MASS INDEX DOCD: CPT | Performed by: OBSTETRICS & GYNECOLOGY

## 2022-02-21 NOTE — LETTER
February 21, 0707     Brooke William MD  [address] [address]    Patient: Zeny Avina   YOB: 1990   Date of Visit: 2/21/2022       Dear Dr Miracle Rodriguez:    Thank you for referring Marya Chu to me for evaluation  Below are my notes for this consultation  If you have questions, please do not hesitate to call me  I look forward to following your patient along with you  Sincerely,        Loco Leiva MD        CC: No Recipients  Loco Leiva MD  2/21/2022  3:11 PM  Sign when Signing Visit  A fetal ultrasound was completed  See Ob procedures in Epic for an interpretation and recommendations  Do not hesitate to contact us in [facility] if you have questions  Gerry Conrad MD, [address] [place] [address]  Maternal Fetal Medicine

## 2022-02-21 NOTE — PROGRESS NOTES
A fetal ultrasound was completed  See Ob procedures in Epic for an interpretation and recommendations  Do not hesitate to contact us in [facility] if you have questions  Marcellus Dela Cruz MD, [address] [facility]  Maternal Fetal Medicine

## 2022-02-21 NOTE — LETTER
February 21, 2022     Luan Krueger MD  [address]    Patient: Omega Gaucher   YOB: 1990   Date of Visit: 2/21/2022       Dear Dr Galilea Gonsales: Thank you for referring Thalia Westfall to me for evaluation  Below are my notes for this consultation  If you have questions, please do not hesitate to call me  I look forward to following your patient along with you  Sincerely,        Laura Rodriguez MD        CC: MD Laura Sosa MD  2/21/2022  3:11 PM  Sign when Signing Visit  A fetal ultrasound was completed  See Ob procedures in Epic for an interpretation and recommendations  Do not hesitate to contact us in [facility] if you have questions  Van White MD, [address] [place] [address]  Maternal Fetal Medicine

## 2022-02-21 NOTE — TELEPHONE ENCOUNTER
Spoke with patient and confirmed her 4/12/22 MFM appointment had to be rescheduled  Patient verbalized understanding of new time, date and location of appointment - 4/20/22  10:00  FARTUN  Patient denies further questions

## 2022-02-24 ENCOUNTER — ROUTINE PRENATAL (OUTPATIENT)
Dept: OBGYN CLINIC | Facility: CLINIC | Age: 32
End: 2022-02-24

## 2022-02-24 DIAGNOSIS — D69.6 THROMBOCYTOPENIA (HCC): ICD-10-CM

## 2022-02-24 DIAGNOSIS — O26.872 CERVICAL SHORTENING, SECOND TRIMESTER: Primary | ICD-10-CM

## 2022-02-24 PROCEDURE — 4004F PT TOBACCO SCREEN RCVD TLK: CPT | Performed by: OBSTETRICS & GYNECOLOGY

## 2022-02-24 PROCEDURE — 99213 OFFICE O/P EST LOW 20 MIN: CPT | Performed by: OBSTETRICS & GYNECOLOGY

## 2022-02-24 NOTE — PROGRESS NOTES
OB/GYN prenatal visit    S: 32 y o   Unknown here for PN visit  She has no obstetric complaints, including pelvic pain, contractions, vaginal bleeding, loss of fluid, or decreased fetal movement  O:  There were no vitals filed for this visit  Gen: no acute distress, nonlabored breathing     Fetal Heart Rate: 152    A/P:  Cervical shortening, second trimester  - Short cervix, measurements 2 3->1 43->1 91 cm; -- respectively   - She is on vaginal progesterone  - The use vaginal progesterone reviewed   - SVE deferred, she decline contractions, pelvic pain  But I rediscussed with patient if any new contractions, pelvic pressure, she need need to OB triage to hospital or clinic depending on availability   - She she received 1 course of steroid      Tobacco smoking affecting pregnancy in second trimester  - She reduce 5 cig/day,  Encourage to quit smoking    Thrombocytopenia ([address] )  - most likely gestational thrombocytopenia, last platelets 502, pregnancy started 147    25 weeks gestation of pregnancy  - immunization:  Flu received, Tdap at 28 weeks  COVID vaccine she decide to receive her after delivery  - contraception:  She was considering sterilization, I mentioned patient the irreversible nature of sterilization  Patient was unaware of it and most likely she will not proceed with permanent sterilization  She may consider to discussed with the partner vasectomy  - 1h Glucola 150, 3h OGTT ordered       IUP at Unknown  No obstetric complaints today  TWG: + not charted  (recommended weight gain 11-20lbs)  Flu vaccine yes, TDAP vaccine at 28 weeks   Contraception: not decided   Breastfeeding: yes  Birth plan: yes about epidural  Discussed  labor precautions and fetal kick counts    Return to clinic in 2 weeks  COVID vaccine: I encourage the patient to receive COVIC vaccine as supported by ACOG and [facility]   Discussed  That pregnant patients are at increased risk for severe illness and complications from [place] compared to general population including hospitalizations, ICU care, intubation, ECMO and even death  Although vaccine studies did not include the pregnant women, to date there are no suggestions that vaccines should be contraindicated in pregnant women and date from [facility]  safe registration does not show increase rate of adverse outcomes; including pregnancy loss, GDM, preeclampsia, FGR  Patient stated understanding and all questions were answered to her satisfaction      COVID 19 precautions were discussed with patient at length, reviewed symptoms, hygiene, social distancing, patient to call office 24/7 with questions/concerns    24-28 weeks: 28 week labs (CBC, RPR, 1hr GTT), Rh status/rhogam, Darshan Alicea MD  5/45/6553  0:48 AM

## 2022-02-24 NOTE — ASSESSMENT & PLAN NOTE
- Short cervix, measurements 2 3->1 43->1 91 cm; 1/14-2/14-2/21 respectively   - She is on vaginal progesterone  - The use vaginal progesterone reviewed   - SVE deferred, she decline contractions, pelvic pain    But I rediscussed with patient if any new contractions, pelvic pressure, she need need to [facility] triage to hospital or clinic depending on availability   - She she received 1 course of steroid

## 2022-02-25 NOTE — ASSESSMENT & PLAN NOTE
- immunization:  Flu received, Tdap at 28 weeks  COVID vaccine she decide to receive her after delivery  - contraception:  She was considering sterilization, I mentioned patient the irreversible nature of sterilization  Patient was unaware of it and most likely she will not proceed with permanent sterilization    She may consider to discussed with the partner vasectomy  - 1h Glucola 150, 3h OGTT ordered

## 2022-03-07 ENCOUNTER — APPOINTMENT (OUTPATIENT)
Dept: LAB | Facility: CLINIC | Age: 32
End: 2022-03-07
Payer: COMMERCIAL

## 2022-03-07 DIAGNOSIS — O26.872 CERVICAL SHORTENING, SECOND TRIMESTER: ICD-10-CM

## 2022-03-07 LAB
GLUCOSE 1H P 100 G GLC PO SERPL-MCNC: 150 MG/DL (ref 65–179)
GLUCOSE 2H P 100 G GLC PO SERPL-MCNC: 125 MG/DL (ref 65–154)
GLUCOSE 3H P 100 G GLC PO SERPL-MCNC: 82 MG/DL (ref 65–139)
GLUCOSE P FAST SERPL-MCNC: 82 MG/DL (ref 65–99)

## 2022-03-07 PROCEDURE — 82952 GTT-ADDED SAMPLES: CPT

## 2022-03-07 PROCEDURE — 82951 GLUCOSE TOLERANCE TEST (GTT): CPT

## 2022-03-07 PROCEDURE — 36415 COLL VENOUS BLD VENIPUNCTURE: CPT

## 2022-03-10 ENCOUNTER — ROUTINE PRENATAL (OUTPATIENT)
Dept: OBGYN CLINIC | Facility: CLINIC | Age: 32
End: 2022-03-10

## 2022-03-10 VITALS
DIASTOLIC BLOOD PRESSURE: 84 MMHG | HEIGHT: 66 IN | WEIGHT: 222 LBS | BODY MASS INDEX: 35.68 KG/M2 | SYSTOLIC BLOOD PRESSURE: 131 MMHG | HEART RATE: 84 BPM

## 2022-03-10 DIAGNOSIS — Z3A.27 27 WEEKS GESTATION OF PREGNANCY: ICD-10-CM

## 2022-03-10 DIAGNOSIS — Z34.92 PRENATAL CARE, SECOND TRIMESTER: ICD-10-CM

## 2022-03-10 DIAGNOSIS — Z23 NEED FOR TDAP VACCINATION: Primary | ICD-10-CM

## 2022-03-10 PROCEDURE — 4004F PT TOBACCO SCREEN RCVD TLK: CPT | Performed by: OBSTETRICS & GYNECOLOGY

## 2022-03-10 PROCEDURE — 90715 TDAP VACCINE 7 YRS/> IM: CPT

## 2022-03-10 PROCEDURE — 90471 IMMUNIZATION ADMIN: CPT

## 2022-03-10 PROCEDURE — 99213 OFFICE O/P EST LOW 20 MIN: CPT | Performed by: OBSTETRICS & GYNECOLOGY

## 2022-03-10 NOTE — PROGRESS NOTES
28 week education packet provided to patient on 03/10/22  Included in packet:   Third Trimester paperwork  Delivery consent   Birthing room support person rules and acknowledgment  Birth Plan   Welcome information  Birth certificate worksheet   Consent for Photographers  Perineal/ Vaginal massage   Pediatric practices and locations

## 2022-03-10 NOTE — PROGRESS NOTES
[facility]  OB/GYN prenatal visit    S: 32 y o  Aron Barrow 27w6d here for PN visit  She has no obstetric complaints, including pelvic pain, contractions, vaginal bleeding, loss of fluid, or decreased fetal movement  Delivery consent signed today    O:  Vitals:    03/10/22 1117   BP: 131/84   Pulse: 84       Gen: no acute distress, nonlabored breathing     Fetal Heart Rate: 145    A/P:      IUP at 27w6d  No obstetric complaints today  Vaccinations:  Tdap today  Contraception: undecided  Breastfeeding: yes  Birth plan:  discussed elective induction/repeat /TOLAC, patient is considering tubal  Discussed  labor precautions and fetal kick counts    Return to clinic in 2 weeks    COVID vaccine: declines    COVID 19 precautions were discussed with patient at length, reviewed symptoms, hygiene, social distancing, patient to call office  with questions/concerns        Pamela Sarah MD  3/10/2022  11:58 AM

## 2022-03-19 NOTE — PATIENT INSTRUCTIONS
Thank you for choosing us for your  care today  If you have any questions about your ultrasound or care, please do not hesitate to contact us or your primary obstetrician  Some general instructions for your pregnancy are:     Protect against coronavirus: get vaccinated - pregnant women are increased risk of severe COVID  Notify your primary care doctor if you have any symptoms   Exercise: Aim for 22 minutes per day (150 minutes per week) of regular exercise  Walking is great!  Nutrition: aim for calcium-rich and iron-rich foods as well as healthy sources of protein   Learn about Preeclampsia: preeclampsia is a common, serious high blood pressure complication in pregnancy  A blood pressure of 837DSUI (systolic or top number) or 27AVWE (diastolic or bottom number) is not normal and needs evaluation by your doctor  Aspirin is sometimes prescribed in early pregnancy to prevent preeclampsia in women with risk factors - ask your obstetrician if you should be on this medication   If you smoke, try to reduce how many cigarettes you smoke or try to quit completely  Do not vape   Other warning signs to watch out for in pregnancy or postpartum: chest pain, obstructed breathing or shortness of breath, seizures, thoughts of hurting yourself or your baby, bleeding, a painful or swollen leg, fever, or headache (see [facility] POST-BIRTH Warning Signs campaign)  If these happen call 911  Itching is also not normal in pregnancy and if you experience this, especially over your hands and feet, potentially worse at night, notify your doctors

## 2022-03-21 ENCOUNTER — ULTRASOUND (OUTPATIENT)
Dept: PERINATAL CARE | Facility: CLINIC | Age: 32
End: 2022-03-21
Payer: COMMERCIAL

## 2022-03-21 VITALS
DIASTOLIC BLOOD PRESSURE: 70 MMHG | WEIGHT: 224 LBS | SYSTOLIC BLOOD PRESSURE: 134 MMHG | HEIGHT: 66 IN | HEART RATE: 101 BPM | BODY MASS INDEX: 36 KG/M2

## 2022-03-21 DIAGNOSIS — O99.332 TOBACCO SMOKING AFFECTING PREGNANCY IN SECOND TRIMESTER: ICD-10-CM

## 2022-03-21 DIAGNOSIS — Z36.2 ENCOUNTER FOR FOLLOW-UP ULTRASOUND OF FETAL ANATOMY: ICD-10-CM

## 2022-03-21 DIAGNOSIS — R10.13 DYSPEPSIA: ICD-10-CM

## 2022-03-21 DIAGNOSIS — O26.872 CERVICAL SHORTENING, SECOND TRIMESTER: ICD-10-CM

## 2022-03-21 DIAGNOSIS — Z36.89 ENCOUNTER FOR ULTRASOUND TO CHECK FETAL GROWTH: Primary | ICD-10-CM

## 2022-03-21 PROCEDURE — 3008F BODY MASS INDEX DOCD: CPT | Performed by: OBSTETRICS & GYNECOLOGY

## 2022-03-21 PROCEDURE — 76816 OB US FOLLOW-UP PER FETUS: CPT | Performed by: OBSTETRICS & GYNECOLOGY

## 2022-03-21 PROCEDURE — 99213 OFFICE O/P EST LOW 20 MIN: CPT | Performed by: OBSTETRICS & GYNECOLOGY

## 2022-03-21 PROCEDURE — 4004F PT TOBACCO SCREEN RCVD TLK: CPT | Performed by: OBSTETRICS & GYNECOLOGY

## 2022-03-21 NOTE — PROGRESS NOTES
[address]: Ms Khanh Alba was seen today for fetal growth and followup missed anatomy ultrasound  See ultrasound report under "OB Procedures" tab  MDM:   I  Diagnoses/Problems addressed:  Undiagnosed new problem(s) with uncertain prognosis: severe dyspepsia w LUQ discomfort  II  Data: I ordered the following tests: CMP, CBC  III  Risk of morbidity: Low    Please don't hesitate to contact our office with any concerns or questions    Asher Castillo MD

## 2022-03-23 ENCOUNTER — HOSPITAL ENCOUNTER (OUTPATIENT)
Facility: HOSPITAL | Age: 32
Discharge: HOME | End: 2022-03-24
Attending: OBSTETRICS & GYNECOLOGY | Admitting: OBSTETRICS & GYNECOLOGY
Payer: COMMERCIAL

## 2022-03-23 ENCOUNTER — NURSE TRIAGE (OUTPATIENT)
Dept: OTHER | Facility: OTHER | Age: 32
End: 2022-03-23

## 2022-03-23 ENCOUNTER — APPOINTMENT (OUTPATIENT)
Dept: LAB | Facility: MEDICAL CENTER | Age: 32
End: 2022-03-23
Payer: COMMERCIAL

## 2022-03-23 VITALS
DIASTOLIC BLOOD PRESSURE: 75 MMHG | HEART RATE: 86 BPM | OXYGEN SATURATION: 95 % | TEMPERATURE: 98.4 F | SYSTOLIC BLOOD PRESSURE: 118 MMHG | RESPIRATION RATE: 18 BRPM

## 2022-03-23 DIAGNOSIS — R10.13 DYSPEPSIA: ICD-10-CM

## 2022-03-23 PROBLEM — Z3A.29 29 WEEKS GESTATION OF PREGNANCY: Status: ACTIVE | Noted: 2022-02-14

## 2022-03-23 LAB
ALBUMIN SERPL BCP-MCNC: 3 G/DL (ref 3.5–5)
ALP SERPL-CCNC: 83 U/L (ref 46–116)
ALT SERPL W P-5'-P-CCNC: 25 U/L (ref 12–78)
ANION GAP SERPL CALCULATED.3IONS-SCNC: 5 MMOL/L (ref 4–13)
AST SERPL W P-5'-P-CCNC: 13 U/L (ref 5–45)
BILIRUB SERPL-MCNC: 0.24 MG/DL (ref 0.2–1)
BUN SERPL-MCNC: 6 MG/DL (ref 5–25)
CALCIUM ALBUM COR SERPL-MCNC: 10 MG/DL (ref 8.3–10.1)
CALCIUM SERPL-MCNC: 9.2 MG/DL (ref 8.3–10.1)
CHLORIDE SERPL-SCNC: 107 MMOL/L (ref 100–108)
CO2 SERPL-SCNC: 26 MMOL/L (ref 21–32)
CREAT SERPL-MCNC: 0.49 MG/DL (ref 0.6–1.3)
ERYTHROCYTE [DISTWIDTH] IN BLOOD BY AUTOMATED COUNT: 12.8 % (ref 11.6–15.1)
GFR SERPL CREATININE-BSD FRML MDRD: 130 ML/MIN/1.73SQ M
GLUCOSE SERPL-MCNC: 90 MG/DL (ref 65–140)
HCT VFR BLD AUTO: 38.2 % (ref 34.8–46.1)
HGB BLD-MCNC: 13.1 G/DL (ref 11.5–15.4)
MCH RBC QN AUTO: 32.8 PG (ref 26.8–34.3)
MCHC RBC AUTO-ENTMCNC: 34.3 G/DL (ref 31.4–37.4)
MCV RBC AUTO: 96 FL (ref 82–98)
PLATELET # BLD AUTO: 132 THOUSANDS/UL (ref 149–390)
PMV BLD AUTO: 12.4 FL (ref 8.9–12.7)
POTASSIUM SERPL-SCNC: 3.6 MMOL/L (ref 3.5–5.3)
PROT SERPL-MCNC: 6.8 G/DL (ref 6.4–8.2)
RBC # BLD AUTO: 4 MILLION/UL (ref 3.81–5.12)
SODIUM SERPL-SCNC: 138 MMOL/L (ref 136–145)
WBC # BLD AUTO: 7.83 THOUSAND/UL (ref 4.31–10.16)

## 2022-03-23 PROCEDURE — 85027 COMPLETE CBC AUTOMATED: CPT

## 2022-03-23 PROCEDURE — 36415 COLL VENOUS BLD VENIPUNCTURE: CPT

## 2022-03-23 PROCEDURE — NC001 PR NO CHARGE: Performed by: OBSTETRICS & GYNECOLOGY

## 2022-03-23 PROCEDURE — 80053 COMPREHEN METABOLIC PANEL: CPT

## 2022-03-23 NOTE — TELEPHONE ENCOUNTER
Regardin weeks pregnant having cramps,lower pelvic pain  ----- Message from Ta Hurtado sent at 3/23/2022  6:17 PM EDT -----  '' I am 30 weeks pregnant and I'm having cramps lower pelvic pain, lower back pain concerned ''

## 2022-03-23 NOTE — TELEPHONE ENCOUNTER
pt  Called in stating she is 31 weeks pregnant with hx of a thinning cervix and has been experiencing moderate pelvic pain since around noon time  Pt stated the pain comes and goes and last a few seconds to a min  Pain is present when standing and walking as well  Pt tried to rest all afternoon and drink plenty of fluids but nothing is helping  She has not tried any Tylenol yet  Lower back pain as well  She denies other symptoms  TT out to on call provider     Per on call provider pt is to head to l[facility]d [facility] to get checked   Pt made aware

## 2022-03-24 PROCEDURE — 76817 TRANSVAGINAL US OBSTETRIC: CPT

## 2022-03-24 PROCEDURE — 99214 OFFICE O/P EST MOD 30 MIN: CPT

## 2022-03-24 PROCEDURE — NC001 PR NO CHARGE: Performed by: OBSTETRICS & GYNECOLOGY

## 2022-03-24 NOTE — PROCEDURES
[place]rist[place] Patient, a  at 29w6d with an LILI of 6/3/2022, by Last Menstrual Period, was seen at [address] for the following procedure(s): $Procedure Type: US - Transvaginal]                   Ultrasound Other  Fetal Presentation: Transverse  Cervical Length: 1 68  Funnel: No  Debris: No  Placenta Previa: No  Vasa Previa: No         Ultrasound Probe Disinfection    A transvaginal ultrasound was performed     Prior to use, disinfection was performed with High Level Disinfection Process (Trophon)  Probe serial number [id number] was used    Tashia Thurston MD  22  4:50 AM

## 2022-03-24 NOTE — DISCHARGE INSTRUCTIONS
[facility]   You may notice new symptoms such as shortness of breath, heartburn, or swelling of your ankles and feet  You may also have trouble sleeping or contractions  DISCHARGE INSTRUCTIONS:   Seek care immediately if:   · You develop a severe headache that does not go away  · You have new or increased vision changes, such as blurred or spotted vision  · You have new or increased swelling in your face or hands  · You have vaginal spotting or bleeding  · Your water broke or you feel warm water gushing or trickling from your vagina  Call your doctor or obstetrician if:   · You have more than 5 contractions in 1 hour  · You notice any changes in your baby's movements  · You have abdominal cramps, pressure, or tightening  · You have a change in vaginal discharge  · You have chills or a fever  · You have vaginal itching, burning, or pain  · You have yellow, green, white, or foul-smelling vaginal discharge  · You have pain or burning when you urinate, less urine than usual, or pink or bloody urine  · You have questions or concerns about your condition or care  How to care for yourself at this stage of your pregnancy:       · Eat a variety of healthy foods  Healthy foods include fruits, vegetables, whole-grain breads, low-fat dairy foods, beans, lean meats, and fish  Drink liquids as directed  Ask how much liquid to drink each day and which liquids are best for you  Limit caffeine to less than 200 milligrams each day  Limit your intake of fish to 2 servings each week  Choose fish low in mercury such as canned light tuna, shrimp, salmon, cod, or tilapia  Do not  eat fish high in mercury such as swordfish, tilefish, rafia mackerel, and shark  · Manage heartburn  by eating 4 or 5 small meals each day instead of large meals  Avoid spicy food  · Manage swelling  by lying down and putting your feet up           · Take prenatal vitamins as directed  Your need for certain vitamins and minerals, such as folic acid, increases during pregnancy  Prenatal vitamins provide some of the extra vitamins and minerals you need  Prenatal vitamins may also help to decrease the risk of certain birth defects  · Talk to your healthcare provider about exercise  Moderate exercise can help you stay fit  Your healthcare provider will help you plan an exercise program that is safe for you during pregnancy  · Do not smoke  Smoking increases your risk of a miscarriage and other health problems during your pregnancy  Smoking can cause your baby to be born too early or weigh less at birth  Ask your healthcare provider for information if you need help quitting  · Do not drink alcohol  Alcohol passes from your body to your baby through the placenta  It can affect your baby's brain development and cause fetal alcohol syndrome (FAS)  FAS is a group of conditions that causes mental, behavior, and growth problems  · Talk to your healthcare provider before you take any medicines  Many medicines may harm your baby if you take them when you are pregnant  Do not take any medicines, vitamins, herbs, or supplements without first talking to your healthcare provider  Never use illegal or street drugs (such as marijuana or cocaine) while you are pregnant  Safety tips during pregnancy:   · Avoid hot tubs and saunas  Do not use a hot tub or sauna while you are pregnant, especially during your first trimester  Hot tubs and saunas may raise your baby's temperature and increase the risk of birth defects  · Avoid toxoplasmosis  This is an infection caused by eating raw meat or being around infected cat feces  It can cause birth defects, miscarriages, and other problems  Wash your hands after you touch raw meat  Make sure any meat is well-cooked before you eat it  Avoid raw eggs and unpasteurized milk   Use gloves or ask someone else to clean your cat's litter box while you are pregnant  Changes that are happening with your baby:  By 30 weeks, your baby may weigh more than 3 pounds  Your baby may be about 11 inches long from the top of the head to the rump (baby's bottom)  Your baby's eyes open and close now  Your baby's kicks and movements are more forceful at this time  What you need to know about prenatal care: Your healthcare provider will check your blood pressure and weight  You may also need the following:  · Blood tests  may be done to check for anemia or blood type  · A urine test  may also be done to check for sugar and protein  These can be signs of gestational diabetes or infection  Protein in your urine may also be a sign of preeclampsia  Preeclampsia is a condition that can develop during week 20 or later of your pregnancy  It causes high blood pressure, and it can cause problems with your kidneys and other organs  · A Tdap vaccine and flu vaccine  may be recommended by your healthcare provider  · A gestational diabetes screen  may be done  Your healthcare provider may order either a 1-step or 2-step oral glucose tolerance test (OGTT)  ? 1-step OGTT:  Your blood sugar level will be tested after you have not eaten for 8 hours (fasting)  You will then be given a glucose drink  Your level will be tested again 1 hour and 2 hours after you finish the drink  ? 2-step OGTT:  You do not have to fast for the first part of the test  You will have the glucose drink at any time of day  Your blood sugar level will be checked 1 hour later  If your blood sugar is higher than a certain level, another test will be ordered  You will fast and your blood sugar level will be tested  You will have the glucose drink  Your blood will be tested again 1 hour, 2 hours, and 3 hours after you finish the glucose drink  · Fundal height  is a measurement of your uterus to check your baby's growth   This number is usually the same as the number of weeks that you have been pregnant  Your healthcare provider may also check your baby's position  · Your baby's heart rate  will be checked  Follow up with your doctor or obstetrician as directed:  Write down your questions so you remember to ask them during your visits  © Copyright [address] 2022 Information is for End User's use only and may not be sold, redistributed or otherwise used for commercial purposes  All illustrations and images included in CareNotes® are the copyrighted property of [employer]  or [facility] Olga Kim   The above information is an  only  It is not intended as medical advice for individual conditions or treatments  Talk to your doctor, nurse or pharmacist before following any medical regimen to see if it is safe and effective for you

## 2022-03-24 NOTE — PROGRESS NOTES
L&D Triage Note - OB/GYN  Daron Cordero 32 y o  female MRN: [id number]  Unit/Bed#: LD TRIAGE  Encounter: [id number]      ASSESSMENT:    Drew Vega is a 32 y o   at 34w10d who presents with pelvic pressure and cramping    PLAN:    1) R/o PTL  - SVE 3/50/-1, unchanged at recheck  - Contractions 4-9m apart  - H/o short cervix, 1 7cm on presentation  - Transverse presentation with fetal head presentation on left  - Recommended Tylenol, heating pad, and lidocaine patch for symptom management    2) Discharge from [facility] triage with pre-term labor precautions    - Reviewed rupture of membranes, false vs true labor, decreased fetal movement, and vaginal bleeding   - Pt to call provider with any concerns and follow up at her next scheduled prenatal appointment   - Continue routine prenatal care   - Case discussed with Dr Marco Galeazzi:    Drew Vega 32 y o  [place] at 29w5d with an Estimated Date of Delivery: 6/3/22 who presents with concerns of pelvic pressure and pain  Patient endorses worsening pelvic pian this afternoon  Reports rest and hydration did not improve pain  Denies dysuria or urinary complaints  Denies other obstetrical complaints  Reported to triage for further assessment      Her current obstetrical history is significant for cervical shortening, thrombocytopenia, elevated BMI    Her past obstetrical history is significant for 1x term     Contractions: present  Leakage of fluid: denies  Vaginal Bleeding: denies  Fetal movement: present    OBJECTIVE:    Vitals:    22 1941   BP: 149/80   Pulse: 91   Resp: 18   Temp: 98 4 °F (36 9 °C)   SpO2: 95%       ROS:  Constitutional: Negative  Respiratory: Negative  Cardiovascular: Negative    Gastrointestinal: Negative    General Physical Exam:  General: in no apparent distress and well developed and well nourished  Cardiovascular: intact distals pulses  Lungs: non-labored breathing  Abdomen: abdomen is soft without significant tenderness, masses, organomegaly or guarding  Lower extremeties: nontender    Cervical Exam  SVE: 3 / 50% / -1    Fetal monitoring:  FHT:  140 bpm/ Moderate 6 - 25 bpm / 10 x 10 accelerations present, no decelerations  [place]: contractions q4-9m, difficult to trace     KOH/WTMT:     Infection:   - neg clue cells    - neg hyphae   - neg trichomonads present    Membrane status   - neg ferning   - neg nitrazine   - neg pooling     Imaging:       TVUS   - Cervical length    - 1 70cm    - 1 63cm    - 1 71cm   - Presentation: transverse          Rodrigo Sotomayor MD  OBGYN PGY-1  3/23/2022 10:34 PM

## 2022-03-24 NOTE — DISCHARGE INSTR - AVS FIRST PAGE
You may take Tylenol 1000mg every 8 hours, heating pad, and lidocaine patches for pain relief on the back  Make sure to continue to have good fluid intake and rest

## 2022-04-07 ENCOUNTER — ROUTINE PRENATAL (OUTPATIENT)
Dept: OBGYN CLINIC | Facility: CLINIC | Age: 32
End: 2022-04-07

## 2022-04-07 VITALS
HEART RATE: 109 BPM | HEIGHT: 66 IN | BODY MASS INDEX: 36.64 KG/M2 | SYSTOLIC BLOOD PRESSURE: 117 MMHG | DIASTOLIC BLOOD PRESSURE: 77 MMHG | WEIGHT: 228 LBS

## 2022-04-07 DIAGNOSIS — Z3A.31 31 WEEKS GESTATION OF PREGNANCY: ICD-10-CM

## 2022-04-07 DIAGNOSIS — Z34.93 PRENATAL CARE IN THIRD TRIMESTER: Primary | ICD-10-CM

## 2022-04-07 PROBLEM — Z3A.29 29 WEEKS GESTATION OF PREGNANCY: Status: RESOLVED | Noted: 2022-02-14 | Resolved: 2022-04-07

## 2022-04-07 PROCEDURE — 4004F PT TOBACCO SCREEN RCVD TLK: CPT | Performed by: OBSTETRICS & GYNECOLOGY

## 2022-04-07 PROCEDURE — 99213 OFFICE O/P EST LOW 20 MIN: CPT | Performed by: OBSTETRICS & GYNECOLOGY

## 2022-04-07 NOTE — PROGRESS NOTES
Clayton Granados presents today for routine OB visit at [address]  Blood Pressure: 117/77  Pz=832 kg (228 lb); Body mass index is 36 8 kg/m² ; TWG=Not found  ;    Abdomen: gravid, soft, non-tender  She reports hip pain  Denies uterine contractions  Denies vaginal bleeding or leaking of fluid  Reports adequate fetal movement of at least 10 movements in 2 hours once daily  Scheduled for ultrasound 04/20/22  Passed 3 hr GTT  Considering sterilization male vs female  Reviewed premature labor precautions and fetal kick counts  Advised to continue medications and return in 2 weeks  Current Outpatient Medications   Medication Instructions    aspirin (ECOTRIN LOW STRENGTH) 81 mg, Oral, Daily    FLUoxetine (PROzac) 20 mg capsule No dose, route, or frequency recorded      Prenatal Vit-DSS-Fe Fum-FA (Prenatal 19) tablet 1 tablet, Oral, Daily    progesterone 100 mg, Vaginal, Daily at bedtime         Pregnancy Problems (from 02/14/22 to present)     Problem Noted Resolved    29 weeks gestation of pregnancy 2/14/2022 by Kali Peraza MD 4/7/2022 by Washington Elliott MD

## 2022-04-20 ENCOUNTER — ULTRASOUND (OUTPATIENT)
Dept: PERINATAL CARE | Facility: OTHER | Age: 32
End: 2022-04-20
Payer: COMMERCIAL

## 2022-04-20 VITALS
SYSTOLIC BLOOD PRESSURE: 128 MMHG | HEART RATE: 95 BPM | DIASTOLIC BLOOD PRESSURE: 80 MMHG | BODY MASS INDEX: 37.22 KG/M2 | WEIGHT: 230.6 LBS

## 2022-04-20 DIAGNOSIS — O99.213 OBESITY AFFECTING PREGNANCY IN THIRD TRIMESTER: ICD-10-CM

## 2022-04-20 DIAGNOSIS — O99.113 IMMUNE THROMBOCYTOPENIA AFFECTING PREGNANCY IN THIRD TRIMESTER (HCC): ICD-10-CM

## 2022-04-20 DIAGNOSIS — O99.333 TOBACCO SMOKING AFFECTING PREGNANCY IN THIRD TRIMESTER: Primary | ICD-10-CM

## 2022-04-20 DIAGNOSIS — Z3A.33 33 WEEKS GESTATION OF PREGNANCY: ICD-10-CM

## 2022-04-20 DIAGNOSIS — D69.3 IMMUNE THROMBOCYTOPENIA AFFECTING PREGNANCY IN THIRD TRIMESTER (HCC): ICD-10-CM

## 2022-04-20 PROCEDURE — 76816 OB US FOLLOW-UP PER FETUS: CPT | Performed by: OBSTETRICS & GYNECOLOGY

## 2022-04-20 PROCEDURE — 99213 OFFICE O/P EST LOW 20 MIN: CPT | Performed by: NURSE PRACTITIONER

## 2022-04-20 PROCEDURE — 4004F PT TOBACCO SCREEN RCVD TLK: CPT | Performed by: NURSE PRACTITIONER

## 2022-04-20 NOTE — PATIENT INSTRUCTIONS
Pregnancy at 31 to 34 Weeks   AMBULATORY CARE:   Changes happening with your body: You may continue to have symptoms such as shortness of breath, heartburn, contractions, or swelling of your ankles and feet  You may be gaining about 1 pound a week now  Seek care immediately if:   · You develop a severe headache that does not go away  · You have new or increased vision changes, such as blurred or spotted vision  · You have new or increased swelling in your face or hands  · You have vaginal spotting or bleeding  · Your water broke or you feel warm water gushing or trickling from your vagina  Call your obstetrician if:   · You have more than 5 contractions in 1 hour  · You notice any changes in your baby's movements  · You have abdominal cramps, pressure, or tightening  · You have a change in vaginal discharge  · You have chills or a fever  · You have vaginal itching, burning, or pain  · You have yellow, green, white, or foul-smelling vaginal discharge  · You have pain or burning when you urinate, less urine than usual, or pink or bloody urine  · You have questions or concerns about your condition or care  How to care for yourself at this stage of your pregnancy:       · Eat a variety of healthy foods  Healthy foods include fruits, vegetables, whole-grain breads, low-fat dairy foods, beans, lean meats, and fish  Drink liquids as directed  Ask how much liquid to drink each day and which liquids are best for you  Limit caffeine to less than 200 milligrams each day  Limit your intake of fish to 2 servings each week  Choose fish low in mercury such as canned light tuna, shrimp, salmon, cod, or tilapia  Do not  eat fish high in mercury such as swordfish, tilefish, rafia mackerel, and shark  · Manage heartburn  by eating 4 or 5 small meals each day instead of large meals  Avoid spicy food  · Manage swelling  by lying down and putting your feet up           · Take prenatal vitamins as directed  Your need for certain vitamins and minerals, such as folic acid, increases during pregnancy  Prenatal vitamins provide some of the extra vitamins and minerals you need  Prenatal vitamins may also help to decrease the risk of certain birth defects  · Talk to your healthcare provider about exercise  Moderate exercise can help you stay fit  Your healthcare provider will help you plan an exercise program that is safe for you during pregnancy  · Do not smoke  Smoking increases your risk of a miscarriage and other health problems during your pregnancy  Smoking can cause your baby to be born too early or weigh less at birth  Ask your healthcare provider for information if you need help quitting  · Do not drink alcohol  Alcohol passes from your body to your baby through the placenta  It can affect your baby's brain development and cause fetal alcohol syndrome (FAS)  FAS is a group of conditions that causes mental, behavior, and growth problems  · Talk to your healthcare provider before you take any medicines  Many medicines may harm your baby if you take them when you are pregnant  Do not take any medicines, vitamins, herbs, or supplements without first talking to your healthcare provider  Never use illegal or street drugs (such as marijuana or cocaine) while you are pregnant  Safety tips during pregnancy:   · Avoid hot tubs and saunas  Do not use a hot tub or sauna while you are pregnant, especially during your first trimester  Hot tubs and saunas may raise your baby's temperature and increase the risk of birth defects  · Avoid toxoplasmosis  This is an infection caused by eating raw meat or being around infected cat feces  It can cause birth defects, miscarriages, and other problems  Wash your hands after you touch raw meat  Make sure any meat is well-cooked before you eat it  Avoid raw eggs and unpasteurized milk   Use gloves or ask someone else to clean your cat's litter box while you are pregnant  Changes happening with your baby:  By 34 weeks, your baby may weigh more than 5 pounds  Your baby will be about 12 ½ inches long from the top of the head to the rump (baby's bottom)  Your baby is gaining about ½ pound a week  Your baby's eyes open and close now  Your baby's kicks and movements are more forceful at this time  What you need to know about prenatal care: Your healthcare provider will check your blood pressure and weight  You may also need the following:  · A urine test  may also be done to check for sugar and protein  These can be signs of gestational diabetes or infection  Protein in your urine may also be a sign of preeclampsia  Preeclampsia is a condition that can develop during week 20 or later of your pregnancy  It causes high blood pressure, and it can cause problems with your kidneys and other organs  · A gestational diabetes screen  may be done  Your healthcare provider may order either a 1-step or 2-step oral glucose tolerance test (OGTT)  ? 1-step OGTT:  Your blood sugar level will be tested after you have not eaten for 8 hours (fasting)  You will then be given a glucose drink  Your level will be tested again 1 hour and 2 hours after you finish the drink  ? 2-step OGTT:  You do not have to fast for the first part of the test  You will have the glucose drink at any time of day  Your blood sugar level will be checked 1 hour later  If your blood sugar is higher than a certain level, another test will be ordered  You will fast and your blood sugar level will be tested  You will have the glucose drink  Your blood will be tested again 1 hour, 2 hours, and 3 hours after you finish the glucose drink  · A Tdap vaccine  may be recommended by your healthcare provider  · Fundal height  is a measurement of your uterus to check your baby's growth  This number is usually the same as the number of weeks that you have been pregnant   Your healthcare provider may also check your baby's position  · Your baby's heart rate  will be checked  Follow up with your obstetrician as directed:  Write down your questions so you remember to ask them during your visits  © Copyright [address] 2022 Information is for End User's use only and may not be sold, redistributed or otherwise used for commercial purposes  All illustrations and images included in CareNotes® are the copyrighted property of A D A M , Inc  or [facility] Olga Kim   The above information is an  only  It is not intended as medical advice for individual conditions or treatments  Talk to your doctor, nurse or pharmacist before following any medical regimen to see if it is safe and effective for you  Fetal Movement   WHAT YOU NEED TO KNOW:   Fetal movements are the kicks, rolls, and hiccups of your unborn baby  You may start to feel these movements when you are 20 weeks pregnant  The movements grow stronger and more frequent as your baby grows  Fetal movements show that your unborn baby is getting the oxygen and nutrients he or she needs before birth  Fewer fetal movements may signal a problem with your baby's health  DISCHARGE INSTRUCTIONS:   Follow up with your doctor or obstetrician as directed:  Write down your questions so you remember to ask them during your visits  Normal fetal movement:  Fetal activity can be described by 4 states, from least to most active  During quiet sleep, your unborn baby may be still for up to 2 hours  During active sleep, he or she kicks, rolls, and moves often  During the quiet awake state, he or she may only move his or her eyes  The active awake state includes strong kicks and rolls  What affects fetal movement:  You may feel your baby move more after you eat, or after you drink caffeine  You may feel your baby move less while you are more active, such as when you exercise  You may also feel fewer movements if you are obese   Certain medicines can change your baby's movements  Tell your healthcare provider about the medicines you are taking  Track fetal movements at home:  Fetal movement is most often felt when you lie quietly on your side  Your healthcare provider may ask you to count movements for 2 hours  He or she may ask you to track how long it takes for your baby to move 10 times  Keep a log of your baby's movements  Contact your doctor or obstetrician if:   · It takes longer than usual to feel 8 of your unborn baby's movements  · You do not feel your unborn baby move at least 10 times in 2 hours  · The skin on your hands, feet, and around your eyes is more swollen than usual     · You have a headache for at least 24 hours  · Tiny red dots appear on your skin  · Your belly is tender when you press on it  · You have questions or concerns about your condition or care  Return to the emergency department if:   · You do not feel your unborn baby move for 12 hours  · You feel cramping or constant pain in your abdomen  · You have heavy bleeding from your vagina  · You have a severe headache and cannot see clearly  · You are having trouble breathing or are vomiting  · You have a seizure  © Copyright [employer] 2022 Information is for End User's use only and may not be sold, redistributed or otherwise used for commercial purposes  All illustrations and images included in CareNotes® are the copyrighted property of [employer]  or Lena Kim   The above information is an  only  It is not intended as medical advice for individual conditions or treatments  Talk to your doctor, nurse or pharmacist before following any medical regimen to see if it is safe and effective for you

## 2022-04-20 NOTE — LETTER
April 20, 2022     [address], [address] [id number]    Patient: Israel Morales   YOB: 1990   Date of Visit: 4/20/2022       Dear Radha Rordiguez: Thank you for referring Concha Morel to me for evaluation  Below are my notes for this consultation  If you have questions, please do not hesitate to call me  I look forward to following your patient along with you  Sincerely,        Estella Redd MD        CC: No Recipients  Estella Redd MD  4/20/2022  2:14 PM  Sign when Signing Visit  [address]: Ms Ida Barnes was seen today at 33w5d for fetal growth assessment ultrasound  See ultrasound report under "OB Procedures" tab    Please don't hesitate to contact our office with any concerns or questions   -Estella Redd MD

## 2022-04-20 NOTE — PROGRESS NOTES
[facility] Nessa Hughes was seen today at 33w5d for fetal growth assessment ultrasound  See ultrasound report under "OB Procedures" tab    Please don't hesitate to contact our office with any concerns or questions   -Froilan Morse MD

## 2022-04-20 NOTE — PROGRESS NOTES
Reviewed U/S findings with patient today  Advised to stop ASA and progesterone at 36 weeks  Aware of breech presentation  Discussed management options should baby remain breech after 36 weeks of version attempt vs primary C/S  Patient wants C/S  Not interested in external version attempt  Discussed importance of continuing efforts of smoking cessation

## 2022-04-20 NOTE — PROGRESS NOTES
Ambika Wright presents today for routine OB visit at 33w5d  Blood Pressure: 128/88  Ao=751 kg (229 lb); Body mass index is 36 96 kg/m² ; TWG=5 574 kg (12 lb 4 6 oz)  Abdomen: gravid, soft, non-tender  She reports her grandmother passed away last night from lung cancer  Denies uterine contractions  Denies vaginal bleeding or leaking of fluid  Reports adequate fetal movement of at least 10 movements in 2 hours once daily  Passed her 3 hour GTT  Last ultrasound 03/21/2022,  Breech, fetal growth at 75%   Scheduled for ultrasound - as indicated  [id number] mg up to 36 wks  Progesterone 200 mgs at night up to 36 wks  Contraception-undecided  Bottle feeding and  breastfeeding-  Epidural yes   Tobacco use- smoking more past weeks due to increase stress with her granmothers illness  CBC and CMP by MFM ordered due to in new onset of GERD unresponsive to [facility]   CMP within normal, platelets at 165C, previously was 130K on 02/14/2022  Reviewed premature labor precautions, preeclamptic precautions and fetal kick counts  Advised to continue medications and return in 2 weeks  COVID 19 precautions were discussed with patient, reviewed symptoms, masking, hygiene, social distancing, avoid high risk gatherings, patient to call office with questions or concerns  Has had Tdap  Declines Covid  Current Outpatient Medications   Medication Instructions    aspirin (ECOTRIN LOW STRENGTH) 81 mg, Oral, Daily    FLUoxetine (PROzac) 20 mg capsule No dose, route, or frequency recorded      Prenatal Vit-DSS-Fe Fum-FA (Prenatal 19) tablet 1 tablet, Oral, Daily    progesterone 100 mg, Vaginal, Daily at bedtime         Pregnancy Problems (from 02/14/22 to present)     Problem Noted Resolved    29 weeks gestation of pregnancy 2/14/2022 by Saroj Smith MD 4/7/2022 by Db White MD

## 2022-04-21 ENCOUNTER — ROUTINE PRENATAL (OUTPATIENT)
Dept: OBGYN CLINIC | Facility: CLINIC | Age: 32
End: 2022-04-21

## 2022-04-21 VITALS
DIASTOLIC BLOOD PRESSURE: 88 MMHG | HEIGHT: 66 IN | WEIGHT: 229 LBS | BODY MASS INDEX: 36.8 KG/M2 | SYSTOLIC BLOOD PRESSURE: 128 MMHG | HEART RATE: 101 BPM

## 2022-04-21 DIAGNOSIS — D69.6 THROMBOCYTOPENIA (HCC): Primary | ICD-10-CM

## 2022-04-21 DIAGNOSIS — Z3A.33 33 WEEKS GESTATION OF PREGNANCY: ICD-10-CM

## 2022-04-21 DIAGNOSIS — O99.333 TOBACCO SMOKING AFFECTING PREGNANCY IN THIRD TRIMESTER: ICD-10-CM

## 2022-04-21 PROCEDURE — 3008F BODY MASS INDEX DOCD: CPT | Performed by: OBSTETRICS & GYNECOLOGY

## 2022-04-21 PROCEDURE — PNV: Performed by: NURSE PRACTITIONER

## 2022-04-21 PROCEDURE — 3008F BODY MASS INDEX DOCD: CPT | Performed by: NURSE PRACTITIONER

## 2022-05-04 PROBLEM — Z3A.35 35 WEEKS GESTATION OF PREGNANCY: Status: ACTIVE | Noted: 2022-04-20

## 2022-05-04 PROBLEM — O99.213 OBESITY AFFECTING PREGNANCY IN THIRD TRIMESTER: Status: ACTIVE | Noted: 2021-12-02

## 2022-05-04 NOTE — PROGRESS NOTES
Ale Lim presents today for routine OB visit at 35w5d  Blood Pressure: 123/82  Es=146 kg (231 lb); Body mass index is 37 28 kg/m² ; TWG=6 481 kg (14 lb 4 6 oz)  Fetal Heart Rate: 144; Fundal Height (cm): 35 cm  Abdomen: gravid, soft, non-tender  Denies uterine contractions  Denies vaginal bleeding or leaking of fluid  Reports adequate fetal movement of at least 10 movements in 2 hours once daily  GBS done today  BREECH position verified by US- Pt desires primary C/S, declines EV  Tobacco use- smokes 5 per day, attempting to decrease  LDASA 162 mg daily to stop at 36 weeks reviewed  Her last ultrasound was 04/20/2022, breech position,  short cervix-on vaginal progesterone until 36 weeks  Scheduled for ultrasound only as indicated  Contraception- plans on Depo  Reviewed premature labor precautions, preeclampsia precautions and fetal kick counts  Advised to continue medications and return in 1 weeks  L & D Unable to schedule C/S at this time- will call back  Current Outpatient Medications   Medication Instructions    aspirin (ECOTRIN LOW STRENGTH) 81 mg, Oral, Daily    FLUoxetine (PROzac) 20 mg capsule No dose, route, or frequency recorded      Prenatal Vit-DSS-Fe Fum-FA (Prenatal 19) tablet 1 tablet, Oral, Daily    progesterone 100 mg, Daily at bedtime         Pregnancy Problems (from 02/14/22 to present)     Problem Noted Resolved    29 weeks gestation of pregnancy 2/14/2022 by Eunice Blanca MD 4/7/2022 by Rock Noble MD

## 2022-05-04 NOTE — PATIENT INSTRUCTIONS
Pregnancy at 28 to 38 Weeks   AMBULATORY CARE:   Changes happening with your body: You are considered full term at the beginning of 37 weeks  Your breathing may be easier if your baby has moved down into a head-down position  You may need to urinate more often because the baby may be pressing on your bladder  You may also feel more discomfort and get tired easily  Seek care immediately if:   · You develop a severe headache that does not go away  · You have new or increased vision changes, such as blurred or spotted vision  · You have new or increased swelling in your face or hands  · You have vaginal spotting or bleeding  · Your water broke or you feel warm water gushing or trickling from your vagina  Call your obstetrician if:   · You have more than 5 contractions in 1 hour  · You notice any changes in your baby's movements  · You have abdominal cramps, pressure, or tightening  · You have a change in vaginal discharge  · You have chills or a fever  · You have vaginal itching, burning, or pain  · You have yellow, green, white, or foul-smelling vaginal discharge  · You have pain or burning when you urinate, less urine than usual, or pink or bloody urine  · You have questions or concerns about your condition or care  How to care for yourself at this stage of your pregnancy:       · Eat a variety of healthy foods  Healthy foods include fruits, vegetables, whole-grain breads, low-fat dairy foods, beans, lean meats, and fish  Drink liquids as directed  Ask how much liquid to drink each day and which liquids are best for you  Limit caffeine to less than 200 milligrams each day  Limit your intake of fish to 2 servings each week  Choose fish low in mercury such as canned light tuna, shrimp, salmon, cod, or tilapia  Do not  eat fish high in mercury such as swordfish, tilefish, rafia mackerel, and shark  · Take prenatal vitamins as directed    Your need for certain vitamins and minerals, such as folic acid, increases during pregnancy  Prenatal vitamins provide some of the extra vitamins and minerals you need  Prenatal vitamins may also help to decrease the risk of certain birth defects  · Rest as needed  Put your feet up if you have swelling in your ankles and feet  · Talk to your healthcare provider about exercise  Moderate exercise can help you stay fit  Your healthcare provider will help you plan an exercise program that is safe for you during pregnancy  · Do not smoke  Smoking increases your risk of a miscarriage and other health problems during your pregnancy  Smoking can cause your baby to be born early or weigh less at birth  Ask your healthcare provider for information if you need help quitting  · Do not drink alcohol  Alcohol passes from your body to your baby through the placenta  It can affect your baby's brain development and cause fetal alcohol syndrome (FAS)  FAS is a group of conditions that causes mental, behavior, and growth problems  · Talk to your healthcare provider before you take any medicines  Many medicines may harm your baby if you take them when you are pregnant  Do not take any medicines, vitamins, herbs, or supplements without first talking to your healthcare provider  Never use illegal or street drugs (such as marijuana or cocaine) while you are pregnant  Safety tips during pregnancy:   · Avoid hot tubs and saunas  Do not use a hot tub or sauna while you are pregnant, especially during your first trimester  Hot tubs and saunas may raise your baby's temperature and increase the risk of birth defects  · Avoid toxoplasmosis  This is an infection caused by eating raw meat or being around infected cat feces  It can cause birth defects, miscarriages, and other problems  Wash your hands after you touch raw meat  Make sure any meat is well-cooked before you eat it  Avoid raw eggs and unpasteurized milk   Use gloves or ask someone else to clean your cat's litter box while you are pregnant  · Ask your healthcare provider about travel  The most comfortable time to travel is during the second trimester  Ask your provider if you can travel after 36 weeks  You may not be able to travel in an airplane after 36 weeks  He or she may also recommend you avoid long road trips  Changes happening with your baby:  By 38 weeks, your baby may weigh between 6 and 9 pounds  Your baby may be about 14 inches long from the top of the head to the rump (baby's bottom)  Your baby hears well enough to know your voice  As your baby gets larger, you may feel fewer kicks and more stretching and rolling  Your baby may move into a head-down position  Your baby will also rest lower in your abdomen  What you need to know about prenatal care: Your healthcare provider will check your blood pressure and weight  You may also need the following:  · A urine test  may also be done to check for sugar and protein  These can be signs of gestational diabetes or infection  Protein in your urine may also be a sign of preeclampsia  Preeclampsia is a condition that can develop during week 20 or later of your pregnancy  It causes high blood pressure, and it can cause problems with your kidneys and other organs  · A gestational diabetes screen  may be done  Your healthcare provider may order either a 1-step or 2-step oral glucose tolerance test (OGTT)  ? 1-step OGTT:  Your blood sugar level will be tested after you have not eaten for 8 hours (fasting)  You will then be given a glucose drink  Your level will be tested again 1 hour and 2 hours after you finish the drink  ? 2-step OGTT:  You do not have to fast for the first part of the test  You will have the glucose drink at any time of day  Your blood sugar level will be checked 1 hour later  If your blood sugar is higher than a certain level, another test will be ordered   You will fast and your blood sugar level will be tested  You will have the glucose drink  Your blood will be tested again 1 hour, 2 hours, and 3 hours after you finish the glucose drink  · A blood test  may be done to check for anemia (low iron level)  · A Tdap vaccine  may be recommended by your healthcare provider  · A group B strep test  is a test that is done to check for group B strep infection  Group B strep is a type of bacteria that may be found in the vagina or rectum  It can be passed to your baby during delivery if you have it  Your healthcare provider will take swab your vagina or rectum and send the sample to the lab for tests  · Fundal height  is a measurement of your uterus to check your baby's growth  This number is usually the same as the number of weeks that you have been pregnant  Your healthcare provider may also check your baby's position  · Your baby's heart rate  will be checked  Follow up with your obstetrician as directed:  Write down your questions so you remember to ask them during your visits  © [employer] 2022 Information is for End User's use only and may not be sold, redistributed or otherwise used for commercial purposes  All illustrations and images included in CareNotes® are the copyrighted property of A D A M [employer] Inc  or [employer]   The above information is an  only  It is not intended as medical advice for individual conditions or treatments  Talk to your doctor, nurse or pharmacist before following any medical regimen to see if it is safe and effective for you  Fetal Movement   WHAT YOU NEED TO KNOW:   Fetal movements are the kicks, rolls, and hiccups of your unborn baby  You may start to feel these movements when you are 20 weeks pregnant  The movements grow stronger and more frequent as your baby grows  Fetal movements show that your unborn baby is getting the oxygen and nutrients he or she needs before birth   Fewer fetal movements may signal a problem with your baby's health  DISCHARGE INSTRUCTIONS:   Follow up with your doctor or obstetrician as directed:  Write down your questions so you remember to ask them during your visits  Normal fetal movement:  Fetal activity can be described by 4 states, from least to most active  During quiet sleep, your unborn baby may be still for up to 2 hours  During active sleep, he or she kicks, rolls, and moves often  During the quiet awake state, he or she may only move his or her eyes  The active awake state includes strong kicks and rolls  What affects fetal movement:  You may feel your baby move more after you eat, or after you drink caffeine  You may feel your baby move less while you are more active, such as when you exercise  You may also feel fewer movements if you are obese  Certain medicines can change your baby's movements  Tell your healthcare provider about the medicines you are taking  Track fetal movements at home:  Fetal movement is most often felt when you lie quietly on your side  Your healthcare provider may ask you to count movements for 2 hours  He or she may ask you to track how long it takes for your baby to move 10 times  Keep a log of your baby's movements  Contact your doctor or obstetrician if:   · It takes longer than usual to feel 8 of your unborn baby's movements  · You do not feel your unborn baby move at least 10 times in 2 hours  · The skin on your hands, feet, and around your eyes is more swollen than usual     · You have a headache for at least 24 hours  · Tiny red dots appear on your skin  · Your belly is tender when you press on it  · You have questions or concerns about your condition or care  Return to the emergency department if:   · You do not feel your unborn baby move for 12 hours  · You feel cramping or constant pain in your abdomen  · You have heavy bleeding from your vagina  · You have a severe headache and cannot see clearly      · You are having trouble breathing or are vomiting  · You have a seizure  © Copyright [employer] 2022 Information is for End User's use only and may not be sold, redistributed or otherwise used for commercial purposes  All illustrations and images included in CareNotes® are the copyrighted property of A D A M , Inc  or Lena Munoz  The above information is an  only  It is not intended as medical advice for individual conditions or treatments  Talk to your doctor, nurse or pharmacist before following any medical regimen to see if it is safe and effective for you

## 2022-05-05 ENCOUNTER — ROUTINE PRENATAL (OUTPATIENT)
Dept: OBGYN CLINIC | Facility: CLINIC | Age: 32
End: 2022-05-05

## 2022-05-05 VITALS
HEIGHT: 66 IN | BODY MASS INDEX: 37.12 KG/M2 | WEIGHT: 231 LBS | DIASTOLIC BLOOD PRESSURE: 82 MMHG | HEART RATE: 102 BPM | SYSTOLIC BLOOD PRESSURE: 123 MMHG

## 2022-05-05 DIAGNOSIS — O99.213 OBESITY AFFECTING PREGNANCY IN THIRD TRIMESTER: Primary | ICD-10-CM

## 2022-05-05 DIAGNOSIS — O99.333 TOBACCO SMOKING AFFECTING PREGNANCY IN THIRD TRIMESTER: ICD-10-CM

## 2022-05-05 DIAGNOSIS — Z3A.35 35 WEEKS GESTATION OF PREGNANCY: ICD-10-CM

## 2022-05-05 DIAGNOSIS — Z34.93 PRENATAL CARE IN THIRD TRIMESTER: ICD-10-CM

## 2022-05-05 PROCEDURE — 4004F PT TOBACCO SCREEN RCVD TLK: CPT | Performed by: NURSE PRACTITIONER

## 2022-05-05 PROCEDURE — 87150 DNA/RNA AMPLIFIED PROBE: CPT | Performed by: NURSE PRACTITIONER

## 2022-05-05 PROCEDURE — 99213 OFFICE O/P EST LOW 20 MIN: CPT | Performed by: NURSE PRACTITIONER

## 2022-05-07 LAB — GP B STREP DNA SPEC QL NAA+PROBE: POSITIVE

## 2022-05-08 PROBLEM — O99.820 GBS (GROUP B STREPTOCOCCUS CARRIER), +RV CULTURE, CURRENTLY PREGNANT: Status: ACTIVE | Noted: 2022-05-08

## 2022-05-09 ENCOUNTER — HOSPITAL ENCOUNTER (OUTPATIENT)
Facility: HOSPITAL | Age: 32
Discharge: HOME | End: 2022-05-09
Attending: OBSTETRICS & GYNECOLOGY | Admitting: STUDENT IN AN ORGANIZED HEALTH CARE EDUCATION/TRAINING PROGRAM
Payer: COMMERCIAL

## 2022-05-09 VITALS
TEMPERATURE: 98.6 F | SYSTOLIC BLOOD PRESSURE: 130 MMHG | RESPIRATION RATE: 18 BRPM | OXYGEN SATURATION: 98 % | HEART RATE: 83 BPM | DIASTOLIC BLOOD PRESSURE: 81 MMHG

## 2022-05-09 LAB
BACTERIA UR QL AUTO: ABNORMAL /HPF
BILIRUB UR QL STRIP: NEGATIVE
BILIRUB UR QL STRIP: NEGATIVE
CLARITY UR: ABNORMAL
CLARITY UR: ABNORMAL
COLOR UR: YELLOW
COLOR UR: YELLOW
GLUCOSE UR STRIP-MCNC: NEGATIVE MG/DL
GLUCOSE UR STRIP-MCNC: NEGATIVE MG/DL
HGB UR QL STRIP.AUTO: NEGATIVE
HGB UR QL STRIP.AUTO: NEGATIVE
KETONES UR STRIP-MCNC: NEGATIVE MG/DL
KETONES UR STRIP-MCNC: NEGATIVE MG/DL
LEUKOCYTE ESTERASE UR QL STRIP: ABNORMAL
LEUKOCYTE ESTERASE UR QL STRIP: ABNORMAL
MUCOUS THREADS UR QL AUTO: ABNORMAL
NITRITE UR QL STRIP: NEGATIVE
NITRITE UR QL STRIP: NEGATIVE
NON-SQ EPI CELLS URNS QL MICRO: ABNORMAL /HPF
PH UR STRIP.AUTO: 7 [PH]
PH UR STRIP.AUTO: 7 [PH] (ref 4.5–8)
PROT UR STRIP-MCNC: NEGATIVE MG/DL
PROT UR STRIP-MCNC: NEGATIVE MG/DL
RBC #/AREA URNS AUTO: ABNORMAL /HPF
SP GR UR STRIP.AUTO: 1.02 (ref 1–1.03)
SP GR UR STRIP.AUTO: 1.02 (ref 1–1.03)
UROBILINOGEN UR QL STRIP.AUTO: 0.2 E.U./DL
UROBILINOGEN UR QL STRIP.AUTO: 0.2 E.U./DL
WBC #/AREA URNS AUTO: ABNORMAL /HPF

## 2022-05-09 PROCEDURE — 99212 OFFICE O/P EST SF 10 MIN: CPT

## 2022-05-09 PROCEDURE — 81001 URINALYSIS AUTO W/SCOPE: CPT

## 2022-05-09 PROCEDURE — NC001 PR NO CHARGE: Performed by: STUDENT IN AN ORGANIZED HEALTH CARE EDUCATION/TRAINING PROGRAM

## 2022-05-09 RX ORDER — ACETAMINOPHEN 325 MG/1
650 TABLET ORAL EVERY 6 HOURS PRN
COMMUNITY
End: 2022-05-20

## 2022-05-10 LAB
AMORPH PHOS CRY URNS QL MICRO: ABNORMAL /HPF
BACTERIA UR QL AUTO: ABNORMAL /HPF
FINE GRAN CASTS URNS QL MICRO: ABNORMAL /LPF
NON-SQ EPI CELLS URNS QL MICRO: ABNORMAL /HPF
OTHER STN SPEC: ABNORMAL
RBC #/AREA URNS AUTO: ABNORMAL /HPF
TRI-PHOS CRY URNS QL MICRO: ABNORMAL /HPF
WBC #/AREA URNS AUTO: ABNORMAL /HPF

## 2022-05-10 NOTE — PROGRESS NOTES
L&D Triage Note - OB/GYN  Jose De Jesus Castro Gil 32 y o  female MRN: [id number]  Unit/Bed#: LD TRIAGE  Encounter: [id number]      Assessment:  32 y o   at 36w3d who presents with left flank pain  Patient was determined not to have a urinary tract infection or be in  labor  Patient discharged home with labor precautions  Plan:  1  Left flank pain   UA pending with trace leuks on urine dip   NST reactive   SVE unchanged at /1   Discharged home with recommendation to use lidocaine patches and tylenol    D/W Dr Uri Corona    ______________________________________________________________________      Chief Complaint: left side pain    Subjective:    32 y o   at 36w3d who presents with left flank pain that radiates to the middle of the abdomin when moving  The pain started as a constant 2/10 a few days ago but progressed to a constant 6/10, with radiations to the abdomin at a 8-9/10  Heating pads and a warm bath did not work but tylenol and lidocaine patches helped to reduce the pain  Kenny Koroma is also experiencing an on and off "aching pain" in her groin  She denies any bleeding and discharge  Denies a fever and burning during urination  ROS:   Review of Systems   Constitutional: Negative for chills and fever  HENT: Negative for ear pain and sore throat  Eyes: Negative for pain and visual disturbance  Respiratory: Negative for cough and shortness of breath  Cardiovascular: Negative for chest pain and palpitations  Gastrointestinal: Negative for abdominal pain and vomiting  Genitourinary: Negative for dysuria and hematuria  Musculoskeletal: Negative for arthralgias and back pain  Skin: Negative for color change and rash  Neurological: Negative for seizures and syncope  All other systems reviewed and are negative         Objective:  Vitals:    22 1711   BP: 155/88   Pulse: 103   Resp: 18   Temp: 99 °F (37 2 °C)   SpO2: 98%       Physical Exam  Constitutional: Appearance: Normal appearance  Genitourinary:      Vulva normal    Cardiovascular:      Pulses: Normal pulses  Pulmonary:      Effort: Pulmonary effort is normal  No respiratory distress  Abdominal:      Palpations: Abdomen is soft  Tenderness: There is no abdominal tenderness  There is no right CVA tenderness or left CVA tenderness  Neurological:      Mental Status: She is alert  Skin:     General: Skin is warm and dry     Psychiatric:         Mood and Affect: Mood normal          Behavior: Behavior normal           SVE: 4 / 50% / -1  SSE: deferred   FHT:  reactive  [place]: irregular    Liu Murphy MD 5/9/2022 8:22 PM

## 2022-05-10 NOTE — DISCHARGE INSTRUCTIONS
Pregnancy at 28 to 38 Weeks   AMBULATORY CARE:   Changes happening with your body: You are considered full term at the beginning of 37 weeks  Your breathing may be easier if your baby has moved down into a head-down position  You may need to urinate more often because the baby may be pressing on your bladder  You may also feel more discomfort and get tired easily  Seek care immediately if:   · You develop a severe headache that does not go away  · You have new or increased vision changes, such as blurred or spotted vision  · You have new or increased swelling in your face or hands  · You have vaginal spotting or bleeding  · Your water broke or you feel warm water gushing or trickling from your vagina  Call your obstetrician if:   · You have more than 5 contractions in 1 hour  · You notice any changes in your baby's movements  · You have abdominal cramps, pressure, or tightening  · You have a change in vaginal discharge  · You have chills or a fever  · You have vaginal itching, burning, or pain  · You have yellow, green, white, or foul-smelling vaginal discharge  · You have pain or burning when you urinate, less urine than usual, or pink or bloody urine  · You have questions or concerns about your condition or care  How to care for yourself at this stage of your pregnancy:       · Eat a variety of healthy foods  Healthy foods include fruits, vegetables, whole-grain breads, low-fat dairy foods, beans, lean meats, and fish  Drink liquids as directed  Ask how much liquid to drink each day and which liquids are best for you  Limit caffeine to less than 200 milligrams each day  Limit your intake of fish to 2 servings each week  Choose fish low in mercury such as canned light tuna, shrimp, salmon, cod, or tilapia  Do not  eat fish high in mercury such as swordfish, tilefish, rafia mackerel, and shark  · Take prenatal vitamins as directed    Your need for certain vitamins and minerals, such as folic acid, increases during pregnancy  Prenatal vitamins provide some of the extra vitamins and minerals you need  Prenatal vitamins may also help to decrease the risk of certain birth defects  · Rest as needed  Put your feet up if you have swelling in your ankles and feet  · Talk to your healthcare provider about exercise  Moderate exercise can help you stay fit  Your healthcare provider will help you plan an exercise program that is safe for you during pregnancy  · Do not smoke  Smoking increases your risk of a miscarriage and other health problems during your pregnancy  Smoking can cause your baby to be born early or weigh less at birth  Ask your healthcare provider for information if you need help quitting  · Do not drink alcohol  Alcohol passes from your body to your baby through the placenta  It can affect your baby's brain development and cause fetal alcohol syndrome (FAS)  FAS is a group of conditions that causes mental, behavior, and growth problems  · Talk to your healthcare provider before you take any medicines  Many medicines may harm your baby if you take them when you are pregnant  Do not take any medicines, vitamins, herbs, or supplements without first talking to your healthcare provider  Never use illegal or street drugs (such as marijuana or cocaine) while you are pregnant  Safety tips during pregnancy:   · Avoid hot tubs and saunas  Do not use a hot tub or sauna while you are pregnant, especially during your first trimester  Hot tubs and saunas may raise your baby's temperature and increase the risk of birth defects  · Avoid toxoplasmosis  This is an infection caused by eating raw meat or being around infected cat feces  It can cause birth defects, miscarriages, and other problems  Wash your hands after you touch raw meat  Make sure any meat is well-cooked before you eat it  Avoid raw eggs and unpasteurized milk   Use gloves or ask someone else to clean your cat's litter box while you are pregnant  · Ask your healthcare provider about travel  The most comfortable time to travel is during the second trimester  Ask your provider if you can travel after 36 weeks  You may not be able to travel in an airplane after 36 weeks  He or she may also recommend you avoid long road trips  Changes happening with your baby:  By 38 weeks, your baby may weigh between 6 and 9 pounds  Your baby may be about 14 inches long from the top of the head to the rump (baby's bottom)  Your baby hears well enough to know your voice  As your baby gets larger, you may feel fewer kicks and more stretching and rolling  Your baby may move into a head-down position  Your baby will also rest lower in your abdomen  What you need to know about prenatal care: Your healthcare provider will check your blood pressure and weight  You may also need the following:  · A urine test  may also be done to check for sugar and protein  These can be signs of gestational diabetes or infection  Protein in your urine may also be a sign of preeclampsia  Preeclampsia is a condition that can develop during week 20 or later of your pregnancy  It causes high blood pressure, and it can cause problems with your kidneys and other organs  · A gestational diabetes screen  may be done  Your healthcare provider may order either a 1-step or 2-step oral glucose tolerance test (OGTT)  ? 1-step OGTT:  Your blood sugar level will be tested after you have not eaten for 8 hours (fasting)  You will then be given a glucose drink  Your level will be tested again 1 hour and 2 hours after you finish the drink  ? 2-step OGTT:  You do not have to fast for the first part of the test  You will have the glucose drink at any time of day  Your blood sugar level will be checked 1 hour later  If your blood sugar is higher than a certain level, another test will be ordered   You will fast and your blood sugar level will be tested  You will have the glucose drink  Your blood will be tested again 1 hour, 2 hours, and 3 hours after you finish the glucose drink  · A blood test  may be done to check for anemia (low iron level)  · A Tdap vaccine  may be recommended by your healthcare provider  · A group B strep test  is a test that is done to check for group B strep infection  Group B strep is a type of bacteria that may be found in the vagina or rectum  It can be passed to your baby during delivery if you have it  Your healthcare provider will take swab your vagina or rectum and send the sample to the lab for tests  · Fundal height  is a measurement of your uterus to check your baby's growth  This number is usually the same as the number of weeks that you have been pregnant  Your healthcare provider may also check your baby's position  · Your baby's heart rate  will be checked  Follow up with your obstetrician as directed:  Write down your questions so you remember to ask them during your visits  © Copyright [employer] 2022 Information is for End User's use only and may not be sold, redistributed or otherwise used for commercial purposes  All illustrations and images included in CareNotes® are the copyrighted property of [email or web address] , Inc  or Lena Kim   The above information is an  only  It is not intended as medical advice for individual conditions or treatments  Talk to your doctor, nurse or pharmacist before following any medical regimen to see if it is safe and effective for you

## 2022-05-13 ENCOUNTER — ROUTINE PRENATAL (OUTPATIENT)
Dept: OBGYN CLINIC | Facility: CLINIC | Age: 32
End: 2022-05-13

## 2022-05-13 VITALS
DIASTOLIC BLOOD PRESSURE: 80 MMHG | SYSTOLIC BLOOD PRESSURE: 122 MMHG | WEIGHT: 230 LBS | HEART RATE: 104 BPM | BODY MASS INDEX: 36.96 KG/M2 | HEIGHT: 66 IN

## 2022-05-13 DIAGNOSIS — Z34.93 PRENATAL CARE IN THIRD TRIMESTER: Primary | ICD-10-CM

## 2022-05-13 DIAGNOSIS — R03.0 ELEVATED BP WITHOUT DIAGNOSIS OF HYPERTENSION: ICD-10-CM

## 2022-05-13 PROCEDURE — PNV: Performed by: OBSTETRICS & GYNECOLOGY

## 2022-05-13 NOTE — PROGRESS NOTES
Rima Francis is a 32 y o  Tilda Score who presents today for routine OB visit at 37w0d  Blood Pressure: 122/80   Vs=446 kg (230 lb); Body mass index is 37 12 kg/m² ; TWG=6 027 kg (13 lb 4 6 oz)    Abdomen: gravid, soft, non-tender  Cervical exam: /-1  She reports intermittent vaginal discharge  Denies uterine contractions  Denies vaginal bleeding or leaking of fluid  Reports adequate fetal movement of at least 10 movements in 2 hours once daily  Plan:   Blood Type: O   RH status: Positive   Birth Plan:  22  GBS status: POSITIVE   Discussed smoking in pregnancy and BP risk, as initial BP reading elevated, did send screening labs CBC, CMP, Uric acid   Will f/u   Reviewed premature labor precautions and fetal kick counts  Advised to continue medications and return in 1 weeks        Current Outpatient Medications:     FLUoxetine (PROzac) 20 mg capsule, , Disp: , Rfl:     Prenatal Vit-DSS-Fe Fum-FA (Prenatal 19) tablet, Take 1 tablet by mouth daily , Disp: , Rfl:     acetaminophen (TYLENOL) 325 mg tablet, Take 650 mg by mouth every 6 (six) hours as needed for mild pain (Patient not taking: Reported on 2022), Disp: , Rfl:     aspirin (ECOTRIN LOW STRENGTH) 81 mg EC tablet, Take 81 mg by mouth daily (Patient not taking: Reported on 2022), Disp: , Rfl:     progesterone 200 mg vaginal suppository, Insert 100 mg into the vagina daily at bedtime   (Patient not taking: Reported on 2022), Disp: , Rfl:       Pregnancy Problems (from 22 to present)     Problem Noted Resolved    GBS (group B Streptococcus carrier), +RV culture, currently pregnant 2022 by LUIS Virk No    29 weeks gestation of pregnancy 2022 by Valentina Aparicio MD 2022 by Providence Pallas, MD

## 2022-05-13 NOTE — PATIENT INSTRUCTIONS
Pregnancy at [address]:   What changes are happening with my body? You are considered full term at the beginning of 37 weeks  Your breathing may be easier if your baby has moved down into a head-down position  You may need to urinate more often because the baby may be pressing on your bladder  You may also feel more discomfort and get tired easily  How do I care for myself at this stage of my pregnancy? Eat a variety of healthy foods  Healthy foods include fruits, vegetables, whole-grain breads, low-fat dairy foods, beans, lean meats, and fish  Drink liquids as directed  Ask how much liquid to drink each day and which liquids are best for you  Limit caffeine to less than 200 milligrams each day  Limit your intake of fish to 2 servings each week  Choose fish low in mercury such as canned light tuna, shrimp, salmon, cod, or tilapia  Do not  eat fish high in mercury such as swordfish, tilefish, rafia mackerel, and shark  Take prenatal vitamins as directed  Your need for certain vitamins and minerals, such as folic acid, increases during pregnancy  Prenatal vitamins provide some of the extra vitamins and minerals you need  Prenatal vitamins may also help to decrease the risk of certain birth defects  Rest as needed  Put your feet up if you have swelling in your ankles and feet  Talk to your healthcare provider about exercise  Moderate exercise can help you stay fit  Your healthcare provider will help you plan an exercise program that is safe for you during pregnancy  Do not smoke  Smoking increases your risk of a miscarriage and other health problems during your pregnancy  Smoking can cause your baby to be born early or weigh less at birth  Ask your healthcare provider for information if you need help quitting  Do not drink alcohol  Alcohol passes from your body to your baby through the placenta   It can affect your baby's brain development and cause fetal alcohol syndrome (FAS)  FAS is a group of conditions that causes mental, behavior, and growth problems  Talk to your healthcare provider before you take any medicines  Many medicines may harm your baby if you take them when you are pregnant  Do not take any medicines, vitamins, herbs, or supplements without first talking to your healthcare provider  Never use illegal or street drugs (such as marijuana or cocaine) while you are pregnant  What are some safety tips during pregnancy? Avoid hot tubs and saunas  Do not use a hot tub or sauna while you are pregnant, especially during your first trimester  Hot tubs and saunas may raise your baby's temperature and increase the risk of birth defects  Avoid toxoplasmosis  This is an infection caused by eating raw meat or being around infected cat feces  It can cause birth defects, miscarriages, and other problems  Wash your hands after you touch raw meat  Make sure any meat is well-cooked before you eat it  Avoid raw eggs and unpasteurized milk  Use gloves or ask someone else to clean your cat's litter box while you are pregnant  Ask your healthcare provider about travel  The most comfortable time to travel is during the second trimester  Ask your provider if you can travel after 36 weeks  You may not be able to travel in an airplane after 36 weeks  He or she may also recommend you avoid long road trips  What changes are happening with my baby? By 38 weeks, your baby may weigh between 6 and 9 pounds  Your baby may be about 14 inches long from the top of the head to the rump (baby's bottom)  Your baby hears well enough to know your voice  As your baby gets larger, you may feel fewer kicks and more stretching and rolling  Your baby may move into a head-down position  Your baby will also rest lower in your abdomen  What do I need to know about prenatal care? Your healthcare provider will check your blood pressure and weight   You may also need the following:  A urine test  may also be done to check for sugar and protein  These can be signs of gestational diabetes or infection  Protein in your urine may also be a sign of preeclampsia  Preeclampsia is a condition that can develop during week 20 or later of your pregnancy  It causes high blood pressure, and it can cause problems with your kidneys and other organs  A gestational diabetes screen  may be done  Your healthcare provider may order either a 1-step or 2-step oral glucose tolerance test (OGTT)  1-step OGTT:  Your blood sugar level will be tested after you have not eaten for 8 hours (fasting)  You will then be given a glucose drink  Your level will be tested again 1 hour and 2 hours after you finish the drink  2-step OGTT:  You do not have to fast for the first part of the test  You will have the glucose drink at any time of day  Your blood sugar level will be checked 1 hour later  If your blood sugar is higher than a certain level, another test will be ordered  You will fast and your blood sugar level will be tested  You will have the glucose drink  Your blood will be tested again 1 hour, 2 hours, and 3 hours after you finish the glucose drink  A blood test  may be done to check for anemia (low iron level)  A Tdap vaccine  may be recommended by your healthcare provider  A group B strep test  is a test that is done to check for group B strep infection  Group B strep is a type of bacteria that may be found in the vagina or rectum  It can be passed to your baby during delivery if you have it  Your healthcare provider will take swab your vagina or rectum and send the sample to the lab for tests  Fundal height  is a measurement of your uterus to check your baby's growth  This number is usually the same as the number of weeks that you have been pregnant  Your healthcare provider may also check your baby's position  Your baby's heart rate  will be checked      When should I seek immediate care? You develop a severe headache that does not go away  You have new or increased vision changes, such as blurred or spotted vision  You have new or increased swelling in your face or hands  You have vaginal spotting or bleeding  Your water broke or you feel warm water gushing or trickling from your vagina  When should I call my obstetrician? You have more than 5 contractions in 1 hour  You notice any changes in your baby's movements  You have abdominal cramps, pressure, or tightening  You have a change in vaginal discharge  You have chills or a fever  You have vaginal itching, burning, or pain  You have yellow, green, white, or foul-smelling vaginal discharge  You have pain or burning when you urinate, less urine than usual, or pink or bloody urine  You have questions or concerns about your condition or care  CARE AGREEMENT:   You have the right to help plan your care  Learn about your health condition and how it may be treated  Discuss treatment options with your healthcare providers to decide what care you want to receive  You always have the right to refuse treatment  The above information is an  only  It is not intended as medical advice for individual conditions or treatments  Talk to your doctor, nurse or pharmacist before following any medical regimen to see if it is safe and effective for you  © Copyright [employer] 2022 Information is for End User's use only and may not be sold, redistributed or otherwise used for commercial purposes  All illustrations and images included in CareNotes® are the copyrighted property of [employer]  or Lena Munoz    Preeclampsia and Eclampsia After Delivery   AMBULATORY CARE:   What you need to know about preeclampsia and eclampsia after delivery:  Preeclampsia is high blood pressure (BP) that usually develops after week 20 of pregnancy   It can also develop days to weeks after delivery, even if you did not have high BP during pregnancy  When it develops after delivery, it may also be called postpartum preeclampsia  Preeclampsia causes your BP to be 140/90 or higher  You may also have protein in your urine or damage to organs such as your kidneys or liver  Preeclampsia can lead to life-threatening conditions such as a stroke or HELLP syndrome (blood cell destruction)  It can also lead to eclampsia, a condition that causes seizures from high BP  Warning signs to watch for:   BP of 130/80 or higher    Shortness of breath    Nausea and vomiting, or severe stomach pain    Severe headaches    Vision changes, or seeing spots in front of your eyes    Arm or leg swelling    Call your local emergency number (911 in the [address]) if:   You have a seizure  You have chest pain  You have severe nausea and vomiting  Call your doctor or obstetrician if:   You develop a severe headache that does not go away  You have new or increased vision changes, such as blurred or spotted vision  You have new or increased swelling in your face or hands  You have severe abdominal pain with or without nausea and vomiting  You have shortness of breath  Your blood pressure is higher than you were told it should be  You have questions or concerns about your condition or care  Manage or prevent preeclampsia or eclampsia after delivery:   Go to follow-up appointments as directed  Your obstetrician will check your blood pressure regularly until it is normal  He or she may also order other tests  Take medicines as directed  Medicines may be given to lower your blood pressure, protect your organs, or prevent seizures  Rest during the day  Your healthcare provider may tell you to rest more often if you have mild symptoms of preeclampsia  Do not drink alcohol or smoke  Alcohol, nicotine, and other chemicals in cigarettes and cigars, can increase your BP  They can also harm your baby   Ask your healthcare provider for information if you currently drink alcohol or smoke and need help to quit  E-cigarettes or smokeless tobacco still contain nicotine  Talk to your healthcare provider before you use these products  Follow up with your doctor or obstetrician as directed:  Write down your questions so you remember to ask them during your visits  © [employer] 2022 Information is for End User's use only and may not be sold, redistributed or otherwise used for commercial purposes  All illustrations and images included in CareNotes® are the copyrighted property of [employer]  or [facility] Olga Kim   The above information is an  only  It is not intended as medical advice for individual conditions or treatments  Talk to your doctor, nurse or pharmacist before following any medical regimen to see if it is safe and effective for you

## 2022-05-17 ENCOUNTER — APPOINTMENT (OUTPATIENT)
Dept: LAB | Facility: MEDICAL CENTER | Age: 32
End: 2022-05-17
Payer: COMMERCIAL

## 2022-05-17 ENCOUNTER — HOSPITAL ENCOUNTER (INPATIENT)
Facility: HOSPITAL | Age: 32
LOS: 3 days | Discharge: HOME | End: 2022-05-20
Attending: OBSTETRICS & GYNECOLOGY | Admitting: OBSTETRICS & GYNECOLOGY
Payer: COMMERCIAL

## 2022-05-17 ENCOUNTER — ANESTHESIA (INPATIENT)
Dept: LABOR AND DELIVERY | Facility: HOSPITAL | Age: 32
End: 2022-05-17
Payer: COMMERCIAL

## 2022-05-17 ENCOUNTER — ANESTHESIA EVENT (INPATIENT)
Dept: LABOR AND DELIVERY | Facility: HOSPITAL | Age: 32
End: 2022-05-17
Payer: COMMERCIAL

## 2022-05-17 DIAGNOSIS — G89.18 POSTOPERATIVE PAIN: ICD-10-CM

## 2022-05-17 DIAGNOSIS — Z34.93 PRENATAL CARE IN THIRD TRIMESTER: ICD-10-CM

## 2022-05-17 DIAGNOSIS — R03.0 ELEVATED BP WITHOUT DIAGNOSIS OF HYPERTENSION: ICD-10-CM

## 2022-05-17 PROBLEM — Z98.891 S/P PRIMARY LOW TRANSVERSE C-SECTION: Status: ACTIVE | Noted: 2022-05-17

## 2022-05-17 PROBLEM — Z3A.37 37 WEEKS GESTATION OF PREGNANCY: Status: ACTIVE | Noted: 2022-04-20

## 2022-05-17 LAB
ABO GROUP BLD: NORMAL
ALBUMIN SERPL BCP-MCNC: 2.9 G/DL (ref 3.5–5)
ALP SERPL-CCNC: 135 U/L (ref 46–116)
ALT SERPL W P-5'-P-CCNC: 18 U/L (ref 12–78)
ANION GAP SERPL CALCULATED.3IONS-SCNC: 11 MMOL/L (ref 4–13)
AST SERPL W P-5'-P-CCNC: 15 U/L (ref 5–45)
BASE EXCESS BLDCOA CALC-SCNC: -5.1 MMOL/L (ref 3–11)
BASE EXCESS BLDCOV CALC-SCNC: -1.6 MMOL/L (ref 1–9)
BASOPHILS # BLD AUTO: 0.02 THOUSANDS/ΜL (ref 0–0.1)
BASOPHILS NFR BLD AUTO: 0 % (ref 0–1)
BILIRUB SERPL-MCNC: 0.6 MG/DL (ref 0.2–1)
BLD GP AB SCN SERPL QL: NEGATIVE
BUN SERPL-MCNC: 6 MG/DL (ref 5–25)
CALCIUM ALBUM COR SERPL-MCNC: 9.7 MG/DL (ref 8.3–10.1)
CALCIUM SERPL-MCNC: 8.8 MG/DL (ref 8.3–10.1)
CHLORIDE SERPL-SCNC: 107 MMOL/L (ref 100–108)
CO2 SERPL-SCNC: 21 MMOL/L (ref 21–32)
CREAT SERPL-MCNC: 0.55 MG/DL (ref 0.6–1.3)
EOSINOPHIL # BLD AUTO: 0.06 THOUSAND/ΜL (ref 0–0.61)
EOSINOPHIL NFR BLD AUTO: 1 % (ref 0–6)
ERYTHROCYTE [DISTWIDTH] IN BLOOD BY AUTOMATED COUNT: 12.6 % (ref 11.6–15.1)
ERYTHROCYTE [DISTWIDTH] IN BLOOD BY AUTOMATED COUNT: 12.8 % (ref 11.6–15.1)
GFR SERPL CREATININE-BSD FRML MDRD: 125 ML/MIN/1.73SQ M
GLUCOSE P FAST SERPL-MCNC: 92 MG/DL (ref 65–99)
HCO3 BLDCOA-SCNC: 21.7 MMOL/L (ref 17.3–27.3)
HCO3 BLDCOV-SCNC: 23.6 MMOL/L (ref 12.2–28.6)
HCT VFR BLD AUTO: 39.2 % (ref 34.8–46.1)
HCT VFR BLD AUTO: 39.5 % (ref 34.8–46.1)
HGB BLD-MCNC: 13.4 G/DL (ref 11.5–15.4)
HGB BLD-MCNC: 13.7 G/DL (ref 11.5–15.4)
IMM GRANULOCYTES # BLD AUTO: 0.06 THOUSAND/UL (ref 0–0.2)
IMM GRANULOCYTES NFR BLD AUTO: 1 % (ref 0–2)
LYMPHOCYTES # BLD AUTO: 0.92 THOUSANDS/ΜL (ref 0.6–4.47)
LYMPHOCYTES NFR BLD AUTO: 7 % (ref 14–44)
MCH RBC QN AUTO: 32.5 PG (ref 26.8–34.3)
MCH RBC QN AUTO: 32.8 PG (ref 26.8–34.3)
MCHC RBC AUTO-ENTMCNC: 33.9 G/DL (ref 31.4–37.4)
MCHC RBC AUTO-ENTMCNC: 34.9 G/DL (ref 31.4–37.4)
MCV RBC AUTO: 93 FL (ref 82–98)
MCV RBC AUTO: 97 FL (ref 82–98)
MONOCYTES # BLD AUTO: 1.05 THOUSAND/ΜL (ref 0.17–1.22)
MONOCYTES NFR BLD AUTO: 8 % (ref 4–12)
NEUTROPHILS # BLD AUTO: 10.34 THOUSANDS/ΜL (ref 1.85–7.62)
NEUTS SEG NFR BLD AUTO: 83 % (ref 43–75)
NRBC BLD AUTO-RTO: 0 /100 WBCS
OXYHGB MFR BLDCOA: 8.3 %
OXYHGB MFR BLDCOV: 36.2 %
PCO2 BLDCOA: 46.5 MM[HG] (ref 30–60)
PCO2 BLDCOV: 41.5 MM HG (ref 27–43)
PH BLDCOA: 7.29 [PH] (ref 7.23–7.43)
PH BLDCOV: 7.37 [PH] (ref 7.19–7.49)
PLATELET # BLD AUTO: 117 THOUSANDS/UL (ref 149–390)
PLATELET # BLD AUTO: 129 THOUSANDS/UL (ref 149–390)
PMV BLD AUTO: 12 FL (ref 8.9–12.7)
PMV BLD AUTO: 12.2 FL (ref 8.9–12.7)
PO2 BLDCOA: <10 MM HG (ref 5–25)
PO2 BLDCOV: 16.3 MM HG (ref 15–45)
POTASSIUM SERPL-SCNC: 3.7 MMOL/L (ref 3.5–5.3)
PROT SERPL-MCNC: 6.6 G/DL (ref 6.4–8.2)
RBC # BLD AUTO: 4.09 MILLION/UL (ref 3.81–5.12)
RBC # BLD AUTO: 4.22 MILLION/UL (ref 3.81–5.12)
RH BLD: POSITIVE
SAO2 % BLDCOV: 7.4 ML/DL
SODIUM SERPL-SCNC: 139 MMOL/L (ref 136–145)
SPECIMEN EXPIRATION DATE: NORMAL
URATE SERPL-MCNC: 2.7 MG/DL (ref 2–6.8)
WBC # BLD AUTO: 12.45 THOUSAND/UL (ref 4.31–10.16)
WBC # BLD AUTO: 15.64 THOUSAND/UL (ref 4.31–10.16)

## 2022-05-17 PROCEDURE — NC001 PR NO CHARGE: Performed by: OBSTETRICS & GYNECOLOGY

## 2022-05-17 PROCEDURE — 85027 COMPLETE CBC AUTOMATED: CPT | Performed by: OBSTETRICS & GYNECOLOGY

## 2022-05-17 PROCEDURE — 99213 OFFICE O/P EST LOW 20 MIN: CPT

## 2022-05-17 PROCEDURE — 86901 BLOOD TYPING SEROLOGIC RH(D): CPT | Performed by: OBSTETRICS & GYNECOLOGY

## 2022-05-17 PROCEDURE — 86900 BLOOD TYPING SEROLOGIC ABO: CPT | Performed by: OBSTETRICS & GYNECOLOGY

## 2022-05-17 PROCEDURE — 59510 CESAREAN DELIVERY: CPT | Performed by: OBSTETRICS & GYNECOLOGY

## 2022-05-17 PROCEDURE — 36415 COLL VENOUS BLD VENIPUNCTURE: CPT

## 2022-05-17 PROCEDURE — 86850 RBC ANTIBODY SCREEN: CPT | Performed by: OBSTETRICS & GYNECOLOGY

## 2022-05-17 PROCEDURE — 80053 COMPREHEN METABOLIC PANEL: CPT

## 2022-05-17 PROCEDURE — 4A1HXCZ MONITORING OF PRODUCTS OF CONCEPTION, CARDIAC RATE, EXTERNAL APPROACH: ICD-10-PCS | Performed by: OBSTETRICS & GYNECOLOGY

## 2022-05-17 PROCEDURE — 85025 COMPLETE CBC W/AUTO DIFF WBC: CPT

## 2022-05-17 PROCEDURE — 86592 SYPHILIS TEST NON-TREP QUAL: CPT | Performed by: OBSTETRICS & GYNECOLOGY

## 2022-05-17 PROCEDURE — 84550 ASSAY OF BLOOD/URIC ACID: CPT

## 2022-05-17 PROCEDURE — 82805 BLOOD GASES W/O2 SATURATION: CPT | Performed by: OBSTETRICS & GYNECOLOGY

## 2022-05-17 RX ORDER — CEFAZOLIN SODIUM 2 G/50ML
2000 SOLUTION INTRAVENOUS ONCE
Status: COMPLETED | OUTPATIENT
Start: 2022-05-17 | End: 2022-05-17

## 2022-05-17 RX ORDER — ONDANSETRON 2 MG/ML
4 INJECTION INTRAMUSCULAR; INTRAVENOUS ONCE AS NEEDED
Status: DISCONTINUED | OUTPATIENT
Start: 2022-05-17 | End: 2022-05-19

## 2022-05-17 RX ORDER — FENTANYL CITRATE/PF 50 MCG/ML
25 SYRINGE (ML) INJECTION
Status: DISCONTINUED | OUTPATIENT
Start: 2022-05-17 | End: 2022-05-19

## 2022-05-17 RX ORDER — MORPHINE SULFATE 1 MG/ML
INJECTION, SOLUTION EPIDURAL; INTRATHECAL; INTRAVENOUS AS NEEDED
Status: DISCONTINUED | OUTPATIENT
Start: 2022-05-17 | End: 2022-05-17

## 2022-05-17 RX ORDER — OXYTOCIN/RINGER'S LACTATE 30/500 ML
PLASTIC BAG, INJECTION (ML) INTRAVENOUS CONTINUOUS PRN
Status: DISCONTINUED | OUTPATIENT
Start: 2022-05-17 | End: 2022-05-17

## 2022-05-17 RX ORDER — ALBUTEROL SULFATE 2.5 MG/3ML
2.5 SOLUTION RESPIRATORY (INHALATION) ONCE AS NEEDED
Status: DISCONTINUED | OUTPATIENT
Start: 2022-05-17 | End: 2022-05-20 | Stop reason: HOSPADM

## 2022-05-17 RX ORDER — SODIUM CHLORIDE, SODIUM LACTATE, POTASSIUM CHLORIDE, CALCIUM CHLORIDE 600; 310; 30; 20 MG/100ML; MG/100ML; MG/100ML; MG/100ML
125 INJECTION, SOLUTION INTRAVENOUS CONTINUOUS
Status: DISCONTINUED | OUTPATIENT
Start: 2022-05-17 | End: 2022-05-17

## 2022-05-17 RX ORDER — ONDANSETRON 2 MG/ML
4 INJECTION INTRAMUSCULAR; INTRAVENOUS EVERY 8 HOURS PRN
Status: DISCONTINUED | OUTPATIENT
Start: 2022-05-17 | End: 2022-05-17

## 2022-05-17 RX ORDER — OXYTOCIN/RINGER'S LACTATE 30/500 ML
62.5 PLASTIC BAG, INJECTION (ML) INTRAVENOUS ONCE
Status: COMPLETED | OUTPATIENT
Start: 2022-05-17 | End: 2022-05-18

## 2022-05-17 RX ORDER — HYDROMORPHONE HCL/PF 1 MG/ML
0.5 SYRINGE (ML) INJECTION
Status: DISCONTINUED | OUTPATIENT
Start: 2022-05-17 | End: 2022-05-19

## 2022-05-17 RX ORDER — TRISODIUM CITRATE DIHYDRATE AND CITRIC ACID MONOHYDRATE 500; 334 MG/5ML; MG/5ML
30 SOLUTION ORAL ONCE
Status: DISCONTINUED | OUTPATIENT
Start: 2022-05-17 | End: 2022-05-17

## 2022-05-17 RX ORDER — BUPIVACAINE HYDROCHLORIDE 7.5 MG/ML
INJECTION, SOLUTION INTRASPINAL AS NEEDED
Status: DISCONTINUED | OUTPATIENT
Start: 2022-05-17 | End: 2022-05-17

## 2022-05-17 RX ORDER — FENTANYL CITRATE 50 UG/ML
INJECTION, SOLUTION INTRAMUSCULAR; INTRAVENOUS AS NEEDED
Status: DISCONTINUED | OUTPATIENT
Start: 2022-05-17 | End: 2022-05-17

## 2022-05-17 RX ORDER — NALBUPHINE HCL 10 MG/ML
10 AMPUL (ML) INJECTION
Status: COMPLETED | OUTPATIENT
Start: 2022-05-17 | End: 2022-05-18

## 2022-05-17 RX ORDER — DEXAMETHASONE SODIUM PHOSPHATE 10 MG/ML
INJECTION, SOLUTION INTRAMUSCULAR; INTRAVENOUS AS NEEDED
Status: DISCONTINUED | OUTPATIENT
Start: 2022-05-17 | End: 2022-05-17

## 2022-05-17 RX ORDER — PROMETHAZINE HYDROCHLORIDE 25 MG/ML
12.5 INJECTION, SOLUTION INTRAMUSCULAR; INTRAVENOUS ONCE AS NEEDED
Status: DISCONTINUED | OUTPATIENT
Start: 2022-05-17 | End: 2022-05-19

## 2022-05-17 RX ORDER — ONDANSETRON 2 MG/ML
INJECTION INTRAMUSCULAR; INTRAVENOUS AS NEEDED
Status: DISCONTINUED | OUTPATIENT
Start: 2022-05-17 | End: 2022-05-17

## 2022-05-17 RX ORDER — KETOROLAC TROMETHAMINE 30 MG/ML
INJECTION, SOLUTION INTRAMUSCULAR; INTRAVENOUS AS NEEDED
Status: DISCONTINUED | OUTPATIENT
Start: 2022-05-17 | End: 2022-05-17

## 2022-05-17 RX ORDER — SIMETHICONE 80 MG
80 TABLET,CHEWABLE ORAL 4 TIMES DAILY PRN
Status: DISCONTINUED | OUTPATIENT
Start: 2022-05-17 | End: 2022-05-20 | Stop reason: HOSPADM

## 2022-05-17 RX ORDER — SODIUM CHLORIDE, SODIUM LACTATE, POTASSIUM CHLORIDE, CALCIUM CHLORIDE 600; 310; 30; 20 MG/100ML; MG/100ML; MG/100ML; MG/100ML
125 INJECTION, SOLUTION INTRAVENOUS CONTINUOUS
Status: DISCONTINUED | OUTPATIENT
Start: 2022-05-17 | End: 2022-05-19

## 2022-05-17 RX ORDER — MEPERIDINE HYDROCHLORIDE 25 MG/ML
12.5 INJECTION INTRAMUSCULAR; INTRAVENOUS; SUBCUTANEOUS
Status: DISCONTINUED | OUTPATIENT
Start: 2022-05-17 | End: 2022-05-19

## 2022-05-17 RX ORDER — ACETAMINOPHEN 325 MG/1
650 TABLET ORAL EVERY 6 HOURS SCHEDULED
Status: DISCONTINUED | OUTPATIENT
Start: 2022-05-18 | End: 2022-05-20 | Stop reason: HOSPADM

## 2022-05-17 RX ADMIN — FENTANYL CITRATE 10 MCG: 50 INJECTION INTRAMUSCULAR; INTRAVENOUS at 18:03

## 2022-05-17 RX ADMIN — MORPHINE SULFATE 0.1 MG: 1 INJECTION, SOLUTION EPIDURAL; INTRATHECAL; INTRAVENOUS at 18:03

## 2022-05-17 RX ADMIN — Medication 62.5 MILLI-UNITS/MIN: at 20:15

## 2022-05-17 RX ADMIN — BUPIVACAINE HYDROCHLORIDE IN DEXTROSE 1.6 ML: 7.5 INJECTION, SOLUTION SUBARACHNOID at 18:03

## 2022-05-17 RX ADMIN — PHENYLEPHRINE HYDROCHLORIDE 40 MCG/MIN: 10 INJECTION INTRAVENOUS at 18:11

## 2022-05-17 RX ADMIN — PHENYLEPHRINE HYDROCHLORIDE 40 MCG/MIN: 10 INJECTION INTRAVENOUS at 18:04

## 2022-05-17 RX ADMIN — KETOROLAC TROMETHAMINE 30 MG: 30 INJECTION, SOLUTION INTRAMUSCULAR at 18:40

## 2022-05-17 RX ADMIN — ONDANSETRON 4 MG: 2 INJECTION INTRAMUSCULAR; INTRAVENOUS at 17:55

## 2022-05-17 RX ADMIN — SIMETHICONE 80 MG: 80 TABLET, CHEWABLE ORAL at 23:41

## 2022-05-17 RX ADMIN — Medication 500 MILLI-UNITS/MIN: at 18:17

## 2022-05-17 RX ADMIN — DEXAMETHASONE SODIUM PHOSPHATE 10 MG: 10 INJECTION, SOLUTION INTRAMUSCULAR; INTRAVENOUS at 18:10

## 2022-05-17 RX ADMIN — SODIUM CHLORIDE, SODIUM LACTATE, POTASSIUM CHLORIDE, AND CALCIUM CHLORIDE 125 ML/HR: .6; .31; .03; .02 INJECTION, SOLUTION INTRAVENOUS at 20:15

## 2022-05-17 RX ADMIN — NALBUPHINE HYDROCHLORIDE 10 MG: 10 INJECTION, SOLUTION INTRAMUSCULAR; INTRAVENOUS; SUBCUTANEOUS at 23:41

## 2022-05-17 RX ADMIN — CEFAZOLIN SODIUM 2000 MG: 2 SOLUTION INTRAVENOUS at 17:55

## 2022-05-17 RX ADMIN — SODIUM CHLORIDE, SODIUM LACTATE, POTASSIUM CHLORIDE, AND CALCIUM CHLORIDE: .6; .31; .03; .02 INJECTION, SOLUTION INTRAVENOUS at 17:53

## 2022-05-17 RX ADMIN — SODIUM CHLORIDE, SODIUM LACTATE, POTASSIUM CHLORIDE, AND CALCIUM CHLORIDE 1000 ML: .6; .31; .03; .02 INJECTION, SOLUTION INTRAVENOUS at 17:48

## 2022-05-17 RX ADMIN — ACETAMINOPHEN 650 MG: 325 TABLET ORAL at 23:41

## 2022-05-17 RX ADMIN — NALBUPHINE HYDROCHLORIDE 10 MG: 10 INJECTION, SOLUTION INTRAMUSCULAR; INTRAVENOUS; SUBCUTANEOUS at 19:18

## 2022-05-17 NOTE — ANESTHESIA PROCEDURE NOTES
Spinal Block    Patient location during procedure: OR  Start time: 5/17/2022 6:03 PM  Reason for block: procedure for pain and at surgeon's request  Staffing  Performed: CRNA and Anesthesiologist   Anesthesiologist: Cindy Lancaster MD  Resident/CRNA: Marycarmen Bob CRNA  Preanesthetic Checklist  Completed: patient identified, IV checked, site marked, risks and benefits discussed, surgical consent, monitors and equipment checked, pre-op evaluation and timeout performed  Spinal Block  Patient position: sitting  Prep: ChloraPrep  Patient monitoring: cardiac monitor and frequent blood pressure checks  Approach: midline  Location: L3-4  Injection technique: single-shot  Needle  Needle type: pencil-tip   Needle gauge: 25 G  Needle length: 10 cm  Assessment  Sensory level: T4  Events: cerebrospinal fluid  Injection Assessment:  negative aspiration for heme, no paresthesia on injection and positive aspiration for clear CSF    Post-procedure:  adhesive bandage applied, pressure dressing applied, secured with tape, site cleaned and sterile dressing applied

## 2022-05-17 NOTE — ANESTHESIA PREPROCEDURE EVALUATION
Procedure:   SECTION () (N/A Uterus)    Relevant Problems   GYN   (+) 35 weeks gestation of pregnancy      HEMATOLOGY   (+) Thrombocytopenia (HCC)      Other   (+) Breech presentation   (+) Obesity affecting pregnancy in third trimester   (+) Tobacco smoking affecting pregnancy in third trimester        Physical Exam    Airway    Mallampati score: II  TM Distance: >3 FB  Neck ROM: full     Dental       Cardiovascular  Cardiovascular exam normal    Pulmonary  Pulmonary exam normal     Other Findings        Anesthesia Plan  ASA Score- 3     Anesthesia Type- spinal with ASA Monitors  Additional Monitors:   Airway Plan:           Plan Factors-Exercise tolerance (METS): >4 METS  Chart reviewed  EKG reviewed  Imaging results reviewed  Existing labs reviewed  Patient summary reviewed  Patient is not a current smoker  Patient instructed to abstain from smoking on day of procedure  Patient did not smoke on day of surgery  Obstructive sleep apnea risk education given perioperatively  Induction-     Postoperative Plan- Plan for postoperative opioid use  Informed Consent- Anesthetic plan and risks discussed with patient  I personally reviewed this patient with the CRNA  Discussed and agreed on the Anesthesia Plan with the CRNA  Mina Ospina

## 2022-05-17 NOTE — ANESTHESIA POSTPROCEDURE EVALUATION
Post-Op Assessment Note    No complications documented      BP      Temp      Pulse     Resp     SpO2

## 2022-05-17 NOTE — H&P
H & P- Obstetrics   Javierhan Cheadle Barrett 32 y o  female MRN: [id number]  Unit/Bed#: LD TRIAGE 3-01 Encounter: [id number]      Assessment: 32 y o  Ambreen Sung at 37w4d admitted for labor in the setting of breech presentation  Will go for 1LTCS  SVE: 6cm  FHT: reactive  Clinical EFW: 7 5lbs ; Breech  GBS status: positive   Postpartum contraception plan: undecided      Plan:   · Admit  · CBC, RPR, Type & Screen  · Analgesia at maternal request  · For 1LTCS for breech  · Antibiotics: Ancef 2g    Dr Valente Solis aware      SUBJECTIVE:    Chief Complaint: contractions    HPI: Ambika Wright is a 32 y o  Ambreen Sung with an LILI of 6/3/2022, by Last Menstrual Period at 37w4d who is being admitted for labor   She complains of uterine contractions, occurring every 3 minutes, has no LOF, and reports no VB  She states she has felt good FM  Pregnancy complications: breech presentation    Patient Active Problem List   Diagnosis    Obesity affecting pregnancy in third trimester    Tobacco smoking affecting pregnancy in third trimester    Cervical shortening, second trimester    Right flank discomfort    Hematuria    Thrombocytopenia ([address] )    Breech presentation    35 weeks gestation of pregnancy    Prenatal care in third trimester    GBS (group B Streptococcus carrier), +RV culture, currently pregnant       Baby complications/comments: none    Review of Systems   Constitutional: Negative for chills and fever  HENT: Negative for ear pain and sore throat  Eyes: Negative for pain and visual disturbance  Respiratory: Negative for cough and shortness of breath  Cardiovascular: Negative for chest pain and palpitations  Gastrointestinal: Positive for abdominal pain  Negative for vomiting  Genitourinary: Negative for dysuria and hematuria  Musculoskeletal: Negative for arthralgias and back pain  Skin: Negative for color change and rash  Neurological: Negative for seizures and syncope     All other systems reviewed and are negative  OB History    Para Term  AB Living   2 1 1     1   SAB IAB Ectopic Multiple Live Births           1      # Outcome Date GA Lbr Kashif/2nd Weight Sex Delivery Anes PTL Lv   2 Current            1 Term 10/10/13 41w2d  3771 g (8 lb 5 oz) F Vag-Spont EPI N EUGENIO      Obstetric Comments   Bleeding at 11 wks during her pregnancy   Post dates       Past Medical History:   Diagnosis Date    Abnormal Pap smear of cervix     Depression     Kidney stone     Seasonal allergies        Past Surgical History:   Procedure Laterality Date    IR IMAGE GUIDED ASPIRATION / DRAINAGE  3/22/2019    [place] BREAST REDUCTION Bilateral 3/12/2019    Procedure: REDUCTION MAMMOPLASTY BREAST;  Surgeon: Jovana Moe MD;  [facility];  Service: [address] [place] [address] Road    as a child to remove abcess; cyst removal    WISDOM TOOTH EXTRACTION      3/4       Social History     Tobacco Use    Smoking status: Current Every Day Smoker     Packs/day: 0 25     Years: 11 00     Pack years: 2 75     Types: Cigarettes    Smokeless tobacco: Never Used    Tobacco comment:  smoked since age 15- per Zach Millin   Substance Use Topics    Alcohol use: Not Currently     Comment: socially       Allergies   Allergen Reactions    Medical Tape Rash and Blisters       Medications Prior to Admission   Medication    acetaminophen (TYLENOL) 325 mg tablet    aspirin (ECOTRIN LOW STRENGTH) 81 mg EC tablet    FLUoxetine (PROzac) 20 mg capsule    Prenatal Vit-DSS-Fe Fum-FA (Prenatal 19) tablet    progesterone 200 mg vaginal suppository         OBJECTIVE:  Vitals: There is no height or weight on file to calculate BMI  Physical Exam:  Physical Exam  Constitutional:       Appearance: Normal appearance  Genitourinary:      Vulva normal    Cardiovascular:      Pulses: Normal pulses  Pulmonary:      Effort: Pulmonary effort is normal  No respiratory distress  Abdominal:      Palpations: Abdomen is soft  Tenderness: There is no abdominal tenderness  Neurological:      Mental Status: She is alert  Skin:     General: Skin is warm and dry  Psychiatric:         Mood and Affect: Mood normal          Behavior: Behavior normal    Vitals reviewed            SVE:  Cervical Dilation: 6  Cervical Effacement: 100  Cervical Consistency: Soft  Fetal Station: -2  Presentation: Breech  Method: Manual  OB Examiner: Valentina Hernadez    FHT:  reactive    TOCO:    q3mins    Lab Results   Component Value Date    WBC 12 45 (H) 05/17/2022    HGB 13 4 05/17/2022    HCT 39 5 05/17/2022     (L) 05/17/2022     Lab Results   Component Value Date     09/29/2014    K 3 7 05/17/2022     05/17/2022    CO2 21 05/17/2022    BUN 6 05/17/2022    CREATININE 0 55 (L) 05/17/2022    GLUCOSE 94 09/29/2014    AST 15 05/17/2022    ALT 18 05/17/2022       Prenatal Labs: Reviewed     Blood type: O+  Antibody: negative  Group B strep: positive  HIV: negative  Hepatitis B: negative  RPR: non-reactive  Rubella: Immune  Varicella: Immune  1 hour Glucose: 150  3 hour glucose: 82, 125, 150  Platelets: 458  Hgb: 13 4    >2 Midnights  INPATIENT       Phylicia Membreno MD  OB/GYN PGY-1  5/17/2022  5:39 PM

## 2022-05-18 PROBLEM — O13.9 GESTATIONAL HYPERTENSION: Status: ACTIVE | Noted: 2022-05-18

## 2022-05-18 LAB
ALBUMIN SERPL BCP-MCNC: 2.8 G/DL (ref 3.5–5)
ALP SERPL-CCNC: 91 U/L (ref 34–104)
ALT SERPL W P-5'-P-CCNC: 8 U/L (ref 7–52)
ANION GAP SERPL CALCULATED.3IONS-SCNC: 6 MMOL/L (ref 4–13)
AST SERPL W P-5'-P-CCNC: 10 U/L (ref 13–39)
BILIRUB SERPL-MCNC: 0.46 MG/DL (ref 0.2–1)
BUN SERPL-MCNC: 8 MG/DL (ref 5–25)
CALCIUM ALBUM COR SERPL-MCNC: 9.2 MG/DL (ref 8.3–10.1)
CALCIUM SERPL-MCNC: 8.2 MG/DL (ref 8.4–10.2)
CHLORIDE SERPL-SCNC: 108 MMOL/L (ref 96–108)
CO2 SERPL-SCNC: 21 MMOL/L (ref 21–32)
CREAT SERPL-MCNC: 0.37 MG/DL (ref 0.6–1.3)
CREAT UR-MCNC: 294.1 MG/DL
ERYTHROCYTE [DISTWIDTH] IN BLOOD BY AUTOMATED COUNT: 12.7 % (ref 11.6–15.1)
GFR SERPL CREATININE-BSD FRML MDRD: 142 ML/MIN/1.73SQ M
GLUCOSE SERPL-MCNC: 113 MG/DL (ref 65–140)
HCT VFR BLD AUTO: 32.7 % (ref 34.8–46.1)
HGB BLD-MCNC: 11.3 G/DL (ref 11.5–15.4)
MCH RBC QN AUTO: 33 PG (ref 26.8–34.3)
MCHC RBC AUTO-ENTMCNC: 34.6 G/DL (ref 31.4–37.4)
MCV RBC AUTO: 96 FL (ref 82–98)
PLATELET # BLD AUTO: 102 THOUSANDS/UL (ref 149–390)
PMV BLD AUTO: 12 FL (ref 8.9–12.7)
POTASSIUM SERPL-SCNC: 3.9 MMOL/L (ref 3.5–5.3)
PROT SERPL-MCNC: 5.2 G/DL (ref 6.4–8.4)
PROT UR-MCNC: 56 MG/DL
PROT/CREAT UR: 0.19 MG/G{CREAT} (ref 0–0.1)
RBC # BLD AUTO: 3.42 MILLION/UL (ref 3.81–5.12)
RPR SER QL: NORMAL
SODIUM SERPL-SCNC: 135 MMOL/L (ref 135–147)
WBC # BLD AUTO: 16.53 THOUSAND/UL (ref 4.31–10.16)

## 2022-05-18 PROCEDURE — 99024 POSTOP FOLLOW-UP VISIT: CPT | Performed by: OBSTETRICS & GYNECOLOGY

## 2022-05-18 PROCEDURE — 85027 COMPLETE CBC AUTOMATED: CPT

## 2022-05-18 PROCEDURE — 82570 ASSAY OF URINE CREATININE: CPT | Performed by: OBSTETRICS & GYNECOLOGY

## 2022-05-18 PROCEDURE — 80053 COMPREHEN METABOLIC PANEL: CPT | Performed by: OBSTETRICS & GYNECOLOGY

## 2022-05-18 PROCEDURE — 84156 ASSAY OF PROTEIN URINE: CPT | Performed by: OBSTETRICS & GYNECOLOGY

## 2022-05-18 RX ORDER — OXYCODONE HYDROCHLORIDE 5 MG/1
5 TABLET ORAL EVERY 4 HOURS PRN
Status: DISCONTINUED | OUTPATIENT
Start: 2022-05-18 | End: 2022-05-20 | Stop reason: HOSPADM

## 2022-05-18 RX ORDER — OXYCODONE HYDROCHLORIDE 5 MG/1
10 TABLET ORAL EVERY 4 HOURS PRN
Status: DISCONTINUED | OUTPATIENT
Start: 2022-05-18 | End: 2022-05-20 | Stop reason: HOSPADM

## 2022-05-18 RX ORDER — ENOXAPARIN SODIUM 100 MG/ML
40 INJECTION SUBCUTANEOUS
Status: DISCONTINUED | OUTPATIENT
Start: 2022-05-18 | End: 2022-05-20 | Stop reason: HOSPADM

## 2022-05-18 RX ORDER — ONDANSETRON 2 MG/ML
4 INJECTION INTRAMUSCULAR; INTRAVENOUS EVERY 8 HOURS PRN
Status: DISCONTINUED | OUTPATIENT
Start: 2022-05-18 | End: 2022-05-20 | Stop reason: HOSPADM

## 2022-05-18 RX ORDER — KETOROLAC TROMETHAMINE 30 MG/ML
15 INJECTION, SOLUTION INTRAMUSCULAR; INTRAVENOUS EVERY 6 HOURS SCHEDULED
Status: DISPENSED | OUTPATIENT
Start: 2022-05-18 | End: 2022-05-19

## 2022-05-18 RX ORDER — CALCIUM CARBONATE 200(500)MG
1000 TABLET,CHEWABLE ORAL DAILY PRN
Status: DISCONTINUED | OUTPATIENT
Start: 2022-05-18 | End: 2022-05-20 | Stop reason: HOSPADM

## 2022-05-18 RX ORDER — DOCUSATE SODIUM 100 MG/1
100 CAPSULE, LIQUID FILLED ORAL 2 TIMES DAILY
Status: DISCONTINUED | OUTPATIENT
Start: 2022-05-18 | End: 2022-05-20 | Stop reason: HOSPADM

## 2022-05-18 RX ORDER — DIPHENHYDRAMINE HCL 25 MG
25 TABLET ORAL EVERY 6 HOURS PRN
Status: DISCONTINUED | OUTPATIENT
Start: 2022-05-18 | End: 2022-05-20 | Stop reason: HOSPADM

## 2022-05-18 RX ADMIN — NALBUPHINE HYDROCHLORIDE 10 MG: 10 INJECTION, SOLUTION INTRAMUSCULAR; INTRAVENOUS; SUBCUTANEOUS at 08:54

## 2022-05-18 RX ADMIN — DOCUSATE SODIUM 100 MG: 100 CAPSULE, LIQUID FILLED ORAL at 08:54

## 2022-05-18 RX ADMIN — OXYCODONE HYDROCHLORIDE 10 MG: 5 TABLET ORAL at 20:28

## 2022-05-18 RX ADMIN — KETOROLAC TROMETHAMINE 15 MG: 30 INJECTION, SOLUTION INTRAMUSCULAR at 08:53

## 2022-05-18 RX ADMIN — ACETAMINOPHEN 650 MG: 325 TABLET ORAL at 06:28

## 2022-05-18 RX ADMIN — SIMETHICONE 80 MG: 80 TABLET, CHEWABLE ORAL at 20:29

## 2022-05-18 RX ADMIN — ENOXAPARIN SODIUM 40 MG: 40 INJECTION SUBCUTANEOUS at 08:53

## 2022-05-18 RX ADMIN — ACETAMINOPHEN 650 MG: 325 TABLET ORAL at 12:45

## 2022-05-18 RX ADMIN — KETOROLAC TROMETHAMINE 15 MG: 30 INJECTION, SOLUTION INTRAMUSCULAR at 02:27

## 2022-05-18 RX ADMIN — DOCUSATE SODIUM 100 MG: 100 CAPSULE, LIQUID FILLED ORAL at 18:10

## 2022-05-18 RX ADMIN — ACETAMINOPHEN 650 MG: 325 TABLET ORAL at 18:10

## 2022-05-18 RX ADMIN — KETOROLAC TROMETHAMINE 15 MG: 30 INJECTION, SOLUTION INTRAMUSCULAR at 15:07

## 2022-05-18 RX ADMIN — SODIUM CHLORIDE 1000 ML: 0.9 INJECTION, SOLUTION INTRAVENOUS at 05:31

## 2022-05-18 NOTE — LACTATION NOTE
This note was copied from a baby's chart  CONSULT - LACTATION  Baby Boy Isa Sepulveda) Cordero 1 days male MRN: [id number]    [facility] NURSERY Room / Bed: (N)/(N) Encounter: [id number]    Maternal Information     MOTHER:  Wilver Young  Maternal Age: 32 y o    OB History: # 1 - Date: 10/10/13, Sex: Female, Weight: 3771 g (8 lb 5 oz), GA: 41w2d, Delivery: Vaginal, Spontaneous, Apgar1: None, Apgar5: None, Living: Living, Birth Comments: None    # 2 - Date: 22, Sex: Male, Weight: 3530 g (7 lb 12 5 oz), GA: 37w4d, Delivery: , Low Transverse, Apgar1: 3, Apgar5: 6, Living: Living, Birth Comments: None   Previouse breast reduction surgery? YES    Lactation history:   Has patient previously breast fed: No (first child would not latch)   How long had patient previously breast fed:     Previous breast feeding complications:       Past Surgical History:   Procedure Laterality Date    IR IMAGE GUIDED ASPIRATION / DRAINAGE  3/22/2019    [place] BREAST REDUCTION Bilateral 3/12/2019    Procedure: REDUCTION MAMMOPLASTY BREAST;  Surgeon: Russell Lim MD;  [facility];  Service: [address] [place] [address]    as a child to remove abcess; cyst removal    WISDOM TOOTH EXTRACTION      3/4        Birth information:  YOB: 2022   Time of birth: 7:12 PM   Sex: male   Delivery type: , Low Transverse   Birth Weight: 3530 g (7 lb 12 5 oz)   Percent of Weight Change: 0%     Gestational Age: 37w4d   [unfilled]    Assessment     No clinical assessment  Hx breast reduction surgery  Feeding recommendations:  breast feed on demand     Met with mother  Provided mother with Ready, Set, Baby booklet  Discussed Skin to Skin contact an benefits to mom and baby  Talked about the delay of the first bath until baby has adjusted  Spoke about the benefits of rooming in  Feeding on cue and what that means for recognizing infant's hunger   Avoidance of pacifiers for the first month discussed  Talked about exclusive breastfeeding for the first 6 months  Positioning and latch reviewed as well as showing images of other feeding positions  Discussed the properties of a good latch in any position  Reviewed hand/manual expression  Discussed s/s that baby is getting enough milk and some s/s that breastfeeding dyad may need further help  Gave information on common concerns, what to expect the first few weeks after delivery, preparing for other caregivers, and how partners can help  Resources for support also provided  Information on hand expression given  Discussed benefits of knowing how to manually express breast including stimulating milk supply, softening nipple for latch and evacuating breast in the event of engorgement  Discussed 2nd night syndrome and ways to calm infant  Hand out given  Provided DC booklet at this time, enc family to review and prepare questions for day of DC  Plan to breast and bottle feed  Mom hx breast reduction surgery, states she was told by surgeon "due to complications with surgery she would likely not have a substantial breast milk supply"  Discussed importance of stimulation in establishing a supply of any volume, enc her to put baby to the breast when he cues and offer bottles after wards if she feels supplementation is needed  Enc pumping for additional support  Mom did receive a breast pump from insurance  Enc her to call for latching support the next time baby cues       Say Nelson RN 5/18/2022 4:29 PM

## 2022-05-18 NOTE — PLAN OF CARE
Problem: PAIN - ADULT  Goal: Verbalizes/displays adequate comfort level or baseline comfort level  Description: Interventions:  - Encourage patient to monitor pain and request assistance  - Assess pain using appropriate pain scale  - Administer analgesics based on type and severity of pain and evaluate response  - Implement non-pharmacological measures as appropriate and evaluate response  - Consider cultural and social influences on pain and pain management  - Notify physician/advanced practitioner if interventions unsuccessful or patient reports new pain  Outcome: Progressing     Problem: INFECTION - ADULT  Goal: Absence or prevention of progression during hospitalization  Description: INTERVENTIONS:  - Assess and monitor for signs and symptoms of infection  - Monitor lab/diagnostic results  - Monitor all insertion sites, i e  indwelling lines, tubes, and drains  - Monitor endotracheal if appropriate and nasal secretions for changes in amount and color  - [place] appropriate cooling/warming therapies per order  - Administer medications as ordered  - Instruct and encourage patient and family to use good hand hygiene technique  - Identify and instruct in appropriate isolation precautions for identified infection/condition  Outcome: Progressing  Goal: Absence of fever/infection during neutropenic period  Description: INTERVENTIONS:  - Monitor WBC    Outcome: Progressing     Problem: SAFETY ADULT  Goal: Patient will remain free of falls  Description: INTERVENTIONS:  - Educate patient/family on patient safety including physical limitations  - Instruct patient to call for assistance with activity   - Consult OT/PT to assist with strengthening/mobility   - Keep Call bell within reach  - Keep bed low and locked with side rails adjusted as appropriate  - Keep care items and personal belongings within reach  - Initiate and maintain comfort rounds  - Make Fall Risk Sign visible to staff  - Apply yellow socks and bracelet for high fall risk patients  - Consider moving patient to room near nurses station  Outcome: Progressing  Goal: Maintain or return to baseline ADL function  Description: INTERVENTIONS:  -  Assess patient's ability to carry out ADLs; assess patient's baseline for ADL function and identify physical deficits which impact ability to perform ADLs (bathing, care of mouth/teeth, toileting, grooming, dressing, etc )  - Assess/evaluate cause of self-care deficits   - Assess range of motion  - Assess patient's mobility; develop plan if impaired  - Assess patient's need for assistive devices and provide as appropriate  - Encourage maximum independence but intervene and supervise when necessary  - Involve family in performance of ADLs  - Assess for home care needs following discharge   - Consider OT consult to assist with ADL evaluation and planning for discharge  - Provide patient education as appropriate  Outcome: Progressing  Goal: Maintains/Returns to pre admission functional level  Description: INTERVENTIONS:  - Perform BMAT or MOVE assessment daily    - Set and communicate daily mobility goal to care team and patient/family/caregiver  - Collaborate with rehabilitation services on mobility goals if consulted  - Out of bed for toileting  - Record patient progress and toleration of activity level   Outcome: Progressing     Problem: Knowledge Deficit  Goal: Patient/family/caregiver demonstrates understanding of disease process, treatment plan, medications, and discharge instructions  Description: Complete learning assessment and assess knowledge base    Interventions:  - Provide teaching at level of understanding  - Provide teaching via preferred learning methods  Outcome: Progressing     Problem: DISCHARGE PLANNING  Goal: Discharge to home or other facility with appropriate resources  Description: INTERVENTIONS:  - Identify barriers to discharge w/patient and caregiver  - Arrange for needed discharge resources and transportation as appropriate  - Identify discharge learning needs (meds, wound care, etc )  - Arrange for interpretive services to assist at discharge as needed  - Refer to Case Management Department for coordinating discharge planning if the patient needs post-hospital services based on physician/advanced practitioner order or complex needs related to functional status, cognitive ability, or social support system  Outcome: Progressing     Problem: POSTPARTUM  Goal: Experiences normal postpartum course  Description: INTERVENTIONS:  - Monitor maternal vital signs  - Assess uterine involution and lochia  Outcome: Progressing  Goal: Appropriate maternal -  bonding  Description: INTERVENTIONS:  - Identify family support  - Assess for appropriate maternal/infant bonding   -Encourage maternal/infant bonding opportunities  - Referral to  or  as needed  Outcome: Progressing  Goal: Establishment of infant feeding pattern  Description: INTERVENTIONS:  - Assess breast/bottle feeding  - Refer to lactation as needed  Outcome: Progressing  Goal: Incision(s), wounds(s) or drain site(s) healing without S/S of infection  Description: INTERVENTIONS  - Assess and document dressing, incision, wound bed, drain sites and surrounding tissue  - Provide patient and family education  Outcome: Progressing

## 2022-05-18 NOTE — ASSESSMENT & PLAN NOTE
Blood pressures normotensive, no symptoms of preeclampsia  Follow up CBC, CMP and urine P/C from [facility]

## 2022-05-18 NOTE — ASSESSMENT & PLAN NOTE
For breech presentation in active labor  Routine postoperative/postpartum care   Hgb 13 7g/dL->11 3   Passed void trial after 1L bolus x2   Encourage ambulation   Encourage breastfeeding  DVT chemoprophylaxis with Lovenox 40mg

## 2022-05-18 NOTE — DISCHARGE SUMMARY
CS Discharge Summary - Rodriguez Baxter 32 y o  female MRN: [id number]    Unit/Bed#: -01 Encounter: [id number]    Admission Date: 2022     Discharge Date: 22    Admitting Diagnosis:   Patient Active Problem List   Diagnosis    Obesity affecting pregnancy in third trimester    Tobacco smoking affecting pregnancy in third trimester    Cervical shortening, second trimester    Right flank discomfort    Hematuria    Thrombocytopenia ([address] )    Breech presentation    37 weeks gestation of pregnancy    Prenatal care in third trimester    GBS (group B Streptococcus carrier), +RV culture, currently pregnant    S/P primary low transverse     Gestational hypertension       Discharge Diagnosis:   Same, delivered    Procedures:   primary  section, low transverse incision    Admitting Attending: Dr Louie Hicks  Delivery Attending: Dr Louie Hicks  Discharge Attending: Dr Natasha Dhillon service: none  Consult attending: none    Hospital Course:     Rodriguez Baxter is a 32 y o  [id number] who was admitted for labor  She presented to triage in was 6 cm dilated and intact  Patient was confirmed to be breech  Decision was made to proceed with a primary low transverse  section in the setting of labor for breech presentation  Patient and family agreed to plan  She then underwent an uncomplicated  section delivery and delivered a viable male  on 22 at 1815  APGARS were 3, 6, 8 at 1, 5 and 10 minutes, respectively   weighed 7lb 12 5oz  Placenta was delivered shortly after   was then transferred to  nursery  Patient tolerated the procedure well and was transferred to recovery in stable condition  The patient's post partum course was unremarkable    Preoperative hemaglobin was 13 4, postoperative was 11 3  Her postoperative pain was well controlled with oral analgesics      On day of discharge, she was ambulating and able to reasonably perform all ADLs  She was voiding and had appropriate bowel function  Pain was well controlled  She was discharged home on post-operative day #3 without complications  Patient was instructed to follow up with her OB as an outpatient and was given appropriate warnings to call provider if she develops signs of infection or uncontrolled pain  Complications:   None apparent    Condition at discharge:   good     Provisions for Follow-Up Care:  See after visit summary for information related to follow-up care and any pertinent home health orders  Disposition:   Home    Planned Readmission:   No    Discharge Medications:   Please see AVS for full list of medications upon discharge  Discharge instructions:   -Do not place anything (no partner, tampons or douche) in your vagina for 6 weeks  -You may walk for exercise for the first 6 weeks then gradually return to your usual activities    -Please do not drive for 1 week if you have no stitches and for 2 weeks if you have stitches or underwent a  delivery     -You may take baths or shower per your preference    -Please look at your bust (breasts) in the mirror daily and call provider for redness or tenderness or increased warmth  - If you have had a  please look at your incision daily as well and call provider for increasing redness or steady drainage from the incision    -Please call your provider if temperature > 100 4*F or 38* C, worsening pain or a foul discharge          Isaiah Boxer, MD  22  2:56 PM

## 2022-05-18 NOTE — PROGRESS NOTES
Obstetrics Progress Note  Opal aHmmond 32 y o  female MRN: [id number]  Unit/Bed#:  313-01 Encounter: [id number]    Assessment/Plan:  Postoperative day #1 s/p primary low transverse  delivery  Stable  Baby in room  By issue:  Gestational hypertension  Assessment & Plan  Blood pressures normotensive, no symptoms of preeclampsia  Follow up CBC, CMP and urine P/C from [facility]      * S/P primary low transverse   Assessment & Plan  For breech presentation in active labor  Routine postoperative/postpartum care   Hgb 13 7g/dL->FU CBC   Urinary output currently inadequate  1 L fluid bolus ordered and  pedroza to be removed later today once output is increased   Encourage ambulation   Encourage breastfeeding  DVT chemoprophylaxis with Lovenox 40mg daily pending CBC      Anticipate discharge postoperative day 2-3  Subjective/Objective   Chief Complaint:   Post delivery  Subjective:   Pain:  controlled  Tolerating PO: yes  Voiding: Pedroza in place  Flatus: no  Bowel Movement: no  Ambulating: not yet  Chest pain: no  Shortness of breath: no  Leg pain: no  Lochia: within normal limits  Infant feeding: breast, formula supplement  Objective:   Vitals:   Temp:  [97 6 °F (36 4 °C)-99 1 °F (37 3 °C)] 99 1 °F (37 3 °C)  HR:  [75-97] 88  Resp:  [18-24] 18  BP: (103-131)/(63-81) 114/63     Intake/Output Summary (Last 24 hours) at 2022 0352  Last data filed at 2022 0530  Gross per 24 hour   Intake 800 ml   Output 1372 ml   Net -572 ml       Physical Exam:   -General: alert and oriented x 3, in no apparent distress  -Cardiovascular: regular rate and rhythm  -Pulmonary: normal effort, clear to auscultation bilaterally  -Abdomen/Pelvis: soft, non-tender, non-distended, no rebound or guarding  Uterine fundus firm and non-tender, -2 cm below the umbilicus   Incision:  Clean, dry and intact  -Genitourinary: concentrated urine in pedroza bag  -Extremities: Nontender    Lab Results   Component Value Date WBC 15 64 (H) 05/17/2022    HGB 13 7 05/17/2022    HCT 39 2 05/17/2022    MCV 93 05/17/2022     (L) 05/17/2022         Cassie James MD  PGY-II, OBGYN  5/18/2022, 6:26 AM

## 2022-05-18 NOTE — PLAN OF CARE
Problem: PAIN - ADULT  Goal: Verbalizes/displays adequate comfort level or baseline comfort level  Description: Interventions:  - Encourage patient to monitor pain and request assistance  - Assess pain using appropriate pain scale  - Administer analgesics based on type and severity of pain and evaluate response  - Implement non-pharmacological measures as appropriate and evaluate response  - Consider cultural and social influences on pain and pain management  - Notify physician/advanced practitioner if interventions unsuccessful or patient reports new pain  Outcome: Progressing     Problem: INFECTION - ADULT  Goal: Absence or prevention of progression during hospitalization  Description: INTERVENTIONS:  - Assess and monitor for signs and symptoms of infection  - Monitor lab/diagnostic results  - Monitor all insertion sites, i e  indwelling lines, tubes, and drains  - Monitor endotracheal if appropriate and nasal secretions for changes in amount and color  - [place] appropriate cooling/warming therapies per order  - Administer medications as ordered  - Instruct and encourage patient and family to use good hand hygiene technique  - Identify and instruct in appropriate isolation precautions for identified infection/condition  Outcome: Progressing  Goal: Absence of fever/infection during neutropenic period  Description: INTERVENTIONS:  - Monitor WBC    Outcome: Progressing     Problem: SAFETY ADULT  Goal: Patient will remain free of falls  Description: INTERVENTIONS:  - Educate patient/family on patient safety including physical limitations  - Instruct patient to call for assistance with activity   - Consult OT/PT to assist with strengthening/mobility   - Keep Call bell within reach  - Keep bed low and locked with side rails adjusted as appropriate  - Keep care items and personal belongings within reach  - Initiate and maintain comfort rounds  - Make Fall Risk Sign visible to staff  - Apply yellow socks and bracelet for high fall risk patients  - Consider moving patient to room near nurses station  Outcome: Progressing  Goal: Maintain or return to baseline ADL function  Description: INTERVENTIONS:  -  Assess patient's ability to carry out ADLs; assess patient's baseline for ADL function and identify physical deficits which impact ability to perform ADLs (bathing, care of mouth/teeth, toileting, grooming, dressing, etc )  - Assess/evaluate cause of self-care deficits   - Assess range of motion  - Assess patient's mobility; develop plan if impaired  - Assess patient's need for assistive devices and provide as appropriate  - Encourage maximum independence but intervene and supervise when necessary  - Involve family in performance of ADLs  - Assess for home care needs following discharge   - Consider OT consult to assist with ADL evaluation and planning for discharge  - Provide patient education as appropriate  Outcome: Progressing  Goal: Maintains/Returns to pre admission functional level  Description: INTERVENTIONS:  - Perform BMAT or MOVE assessment daily    - Set and communicate daily mobility goal to care team and patient/family/caregiver  - Collaborate with rehabilitation services on mobility goals if consulted  -- Out of bed for toileting  - Record patient progress and toleration of activity level   Outcome: Progressing     Problem: Knowledge Deficit  Goal: Patient/family/caregiver demonstrates understanding of disease process, treatment plan, medications, and discharge instructions  Description: Complete learning assessment and assess knowledge base    Interventions:  - Provide teaching at level of understanding  - Provide teaching via preferred learning methods  Outcome: Progressing     Problem: DISCHARGE PLANNING  Goal: Discharge to home or other facility with appropriate resources  Description: INTERVENTIONS:  - Identify barriers to discharge w/patient and caregiver  - Arrange for needed discharge resources and transportation as appropriate  - Identify discharge learning needs (meds, wound care, etc )  - Arrange for interpretive services to assist at discharge as needed  - Refer to Case Management Department for coordinating discharge planning if the patient needs post-hospital services based on physician/advanced practitioner order or complex needs related to functional status, cognitive ability, or social support system  Outcome: Progressing     Problem: POSTPARTUM  Goal: Experiences normal postpartum course  Description: INTERVENTIONS:  - Monitor maternal vital signs  - Assess uterine involution and lochia  Outcome: Progressing  Goal: Appropriate maternal -  bonding  Description: INTERVENTIONS:  - Identify family support  - Assess for appropriate maternal/infant bonding   -Encourage maternal/infant bonding opportunities  - Referral to  or  as needed  Outcome: Progressing  Goal: Establishment of infant feeding pattern  Description: INTERVENTIONS:  - Assess breast/bottle feeding  - Refer to lactation as needed  Outcome: Progressing  Goal: Incision(s), wounds(s) or drain site(s) healing without S/S of infection  Description: INTERVENTIONS  - Assess and document dressing, incision, wound bed, drain sites and surrounding tissue  - Provide patient and family education  - Outcome: Progressing

## 2022-05-18 NOTE — PLAN OF CARE
Problem: PAIN - ADULT  Goal: Verbalizes/displays adequate comfort level or baseline comfort level  Description: Interventions:  - Encourage patient to monitor pain and request assistance  - Assess pain using appropriate pain scale  - Administer analgesics based on type and severity of pain and evaluate response  - Implement non-pharmacological measures as appropriate and evaluate response  - Consider cultural and social influences on pain and pain management  - Notify physician/advanced practitioner if interventions unsuccessful or patient reports new pain  Outcome: Progressing     Problem: INFECTION - ADULT  Goal: Absence or prevention of progression during hospitalization  Description: INTERVENTIONS:  - Assess and monitor for signs and symptoms of infection  - Monitor lab/diagnostic results  - Monitor all insertion sites, i e  indwelling lines, tubes, and drains  - Monitor endotracheal if appropriate and nasal secretions for changes in amount and color  - [place] appropriate cooling/warming therapies per order  - Administer medications as ordered  - Instruct and encourage patient and family to use good hand hygiene technique  - Identify and instruct in appropriate isolation precautions for identified infection/condition  Outcome: Progressing  Goal: Absence of fever/infection during neutropenic period  Description: INTERVENTIONS:  - Monitor WBC    Outcome: Progressing     Problem: SAFETY ADULT  Goal: Patient will remain free of falls  Description: INTERVENTIONS:  - Educate patient/family on patient safety including physical limitations  - Instruct patient to call for assistance with activity   - Consult OT/PT to assist with strengthening/mobility   - Keep Call bell within reach  - Keep bed low and locked with side rails adjusted as appropriate  - Keep care items and personal belongings within reach  - Initiate and maintain comfort rounds  - Make Fall Risk Sign visible to staff  - Offer Toileting every  Hours, in advance of need  - Initiate/Maintain alarm  - Obtain necessary fall risk management equipment:   - Apply yellow socks and bracelet for high fall risk patients  - Consider moving patient to room near nurses station  Outcome: Progressing  Goal: Maintain or return to baseline ADL function  Description: INTERVENTIONS:  -  Assess patient's ability to carry out ADLs; assess patient's baseline for ADL function and identify physical deficits which impact ability to perform ADLs (bathing, care of mouth/teeth, toileting, grooming, dressing, etc )  - Assess/evaluate cause of self-care deficits   - Assess range of motion  - Assess patient's mobility; develop plan if impaired  - Assess patient's need for assistive devices and provide as appropriate  - Encourage maximum independence but intervene and supervise when necessary  - Involve family in performance of ADLs  - Assess for home care needs following discharge   - Consider OT consult to assist with ADL evaluation and planning for discharge  - Provide patient education as appropriate  Outcome: Progressing  Goal: Maintains/Returns to pre admission functional level  Description: INTERVENTIONS:  - Perform BMAT or MOVE assessment daily    - Set and communicate daily mobility goal to care team and patient/family/caregiver  - Collaborate with rehabilitation services on mobility goals if consulted  - Perform Range of Motion times a day  - Reposition patient every hours    - Dangle patient  times a day  - Stand patient  times a day  - Ambulate patient times a day  - Out of bed to chair  times a day   - Out of bed for meals  times a day  - Out of bed for toileting  - Record patient progress and toleration of activity level   Outcome: Progressing     Problem: Knowledge Deficit  Goal: Patient/family/caregiver demonstrates understanding of disease process, treatment plan, medications, and discharge instructions  Description: Complete learning assessment and assess knowledge base   Interventions:  - Provide teaching at level of understanding  - Provide teaching via preferred learning methods  Outcome: Progressing     Problem: DISCHARGE PLANNING  Goal: Discharge to home or other facility with appropriate resources  Description: INTERVENTIONS:  - Identify barriers to discharge w/patient and caregiver  - Arrange for needed discharge resources and transportation as appropriate  - Identify discharge learning needs (meds, wound care, etc )  - Arrange for interpretive services to assist at discharge as needed  - Refer to Case Management Department for coordinating discharge planning if the patient needs post-hospital services based on physician/advanced practitioner order or complex needs related to functional status, cognitive ability, or social support system  Outcome: Progressing     Problem: POSTPARTUM  Goal: Experiences normal postpartum course  Description: INTERVENTIONS:  - Monitor maternal vital signs  - Assess uterine involution and lochia  Outcome: Progressing  Goal: Appropriate maternal -  bonding  Description: INTERVENTIONS:  - Identify family support  - Assess for appropriate maternal/infant bonding   -Encourage maternal/infant bonding opportunities  - Referral to  or  as needed  Outcome: Progressing  Goal: Establishment of infant feeding pattern  Description: INTERVENTIONS:  - Assess breast/bottle feeding  - Refer to lactation as needed  Outcome: Progressing  Goal: Incision(s), wounds(s) or drain site(s) healing without S/S of infection  Description: INTERVENTIONS  - Assess and document dressing, incision, wound bed, drain sites and surrounding tissue  - Provide patient and family education  - Perform skin care/dressing changes every   Outcome: Progressing

## 2022-05-19 PROCEDURE — 99024 POSTOP FOLLOW-UP VISIT: CPT | Performed by: OBSTETRICS & GYNECOLOGY

## 2022-05-19 RX ORDER — IBUPROFEN 600 MG/1
600 TABLET ORAL EVERY 6 HOURS PRN
Status: DISCONTINUED | OUTPATIENT
Start: 2022-05-19 | End: 2022-05-20 | Stop reason: HOSPADM

## 2022-05-19 RX ADMIN — DOCUSATE SODIUM 100 MG: 100 CAPSULE, LIQUID FILLED ORAL at 19:19

## 2022-05-19 RX ADMIN — ENOXAPARIN SODIUM 40 MG: 40 INJECTION SUBCUTANEOUS at 09:52

## 2022-05-19 RX ADMIN — IBUPROFEN 600 MG: 600 TABLET ORAL at 09:52

## 2022-05-19 RX ADMIN — ACETAMINOPHEN 650 MG: 325 TABLET ORAL at 13:14

## 2022-05-19 RX ADMIN — SIMETHICONE 80 MG: 80 TABLET, CHEWABLE ORAL at 00:57

## 2022-05-19 RX ADMIN — IBUPROFEN 600 MG: 600 TABLET ORAL at 20:29

## 2022-05-19 RX ADMIN — IBUPROFEN 600 MG: 600 TABLET ORAL at 00:57

## 2022-05-19 RX ADMIN — OXYCODONE HYDROCHLORIDE 5 MG: 5 TABLET ORAL at 04:35

## 2022-05-19 RX ADMIN — DOCUSATE SODIUM 100 MG: 100 CAPSULE, LIQUID FILLED ORAL at 09:53

## 2022-05-19 RX ADMIN — OXYCODONE HYDROCHLORIDE 10 MG: 5 TABLET ORAL at 00:54

## 2022-05-19 RX ADMIN — ACETAMINOPHEN 650 MG: 325 TABLET ORAL at 19:19

## 2022-05-19 RX ADMIN — ACETAMINOPHEN 650 MG: 325 TABLET ORAL at 06:11

## 2022-05-19 RX ADMIN — OXYCODONE HYDROCHLORIDE 5 MG: 5 TABLET ORAL at 23:54

## 2022-05-19 RX ADMIN — ACETAMINOPHEN 650 MG: 325 TABLET ORAL at 00:04

## 2022-05-19 RX ADMIN — SIMETHICONE 80 MG: 80 TABLET, CHEWABLE ORAL at 20:29

## 2022-05-19 NOTE — PROGRESS NOTES
Obstetrics Progress Note  Yumiko Gonzales 32 y o  female MRN: [id number]  Unit/Bed#: -01 Encounter: [id number]    Assessment/Plan:  Postoperative day #2 s/p primary low transverse  delivery  Stable  Baby in nursery  By issue:  Gestational hypertension  Assessment & Plan  Blood pressures normotensive, no symptoms of preeclampsia  Follow up CBC, CMP and urine P/C from [facility]      * S/P primary low transverse   Assessment & Plan  For breech presentation in active labor  Routine postoperative/postpartum care   Hgb 13 7g/dL->11 3   Passed void trial after 1L bolus x2   Encourage ambulation   Encourage breastfeeding  DVT chemoprophylaxis with Lovenox 40mg      Anticipate discharge tomorrow as baby is staying for elevated bilirubin  Subjective/Objective   Chief Complaint:   Post delivery  Subjective:   Pain:  controlled  Tolerating PO: yes  Voiding: yes  Flatus: no  Bowel Movement: no  Ambulating: not yet  Chest pain: no  Shortness of breath: no  Leg pain: no  Lochia: within normal limits  Infant feeding: breast, formula supplement  Objective:   Vitals:   Temp:  [97 4 °F (36 3 °C)-98 4 °F (36 9 °C)] 98 2 °F (36 8 °C)  HR:  [81-95] 95  Resp:  [18-20] 18  BP: (108-129)/(57-71) 117/59     Intake/Output Summary (Last 24 hours) at 2022 0648  Last data filed at 2022 3944  Gross per 24 hour   Intake --   Output 850 ml   Net -850 ml       Physical Exam:   -General: alert and oriented x 3, in no apparent distress  -Cardiovascular: regular rate and rhythm  -Pulmonary: normal effort, clear to auscultation bilaterally  -Abdomen/Pelvis: soft, non-tender, non-distended, no rebound or guarding  Uterine fundus firm and non-tender, -2 cm below the umbilicus   Incision:  Clean, dry and intact  -Genitourinary: voiding spontaneously  -Extremities: Nontender    Lab Results   Component Value Date    WBC 16 53 (H) 2022    HGB 11 3 (L) 2022    HCT 32 7 (L) 2022    MCV 96 2022  (L) 05/18/2022       Anastacia Davis MD  PGY-1   5/19/2022  6:49 AM

## 2022-05-19 NOTE — PLAN OF CARE
Problem: PAIN - ADULT  Goal: Verbalizes/displays adequate comfort level or baseline comfort level  Description: Interventions:  - Encourage patient to monitor pain and request assistance  - Assess pain using appropriate pain scale  - Administer analgesics based on type and severity of pain and evaluate response  - Implement non-pharmacological measures as appropriate and evaluate response  - Consider cultural and social influences on pain and pain management  - Notify physician/advanced practitioner if interventions unsuccessful or patient reports new pain  Outcome: Progressing     Problem: INFECTION - ADULT  Goal: Absence or prevention of progression during hospitalization  Description: INTERVENTIONS:  - Assess and monitor for signs and symptoms of infection  - Monitor lab/diagnostic results  - Monitor all insertion sites, i e  indwelling lines, tubes, and drains  - Monitor endotracheal if appropriate and nasal secretions for changes in amount and color  - [place] appropriate cooling/warming therapies per order  - Administer medications as ordered  - Instruct and encourage patient and family to use good hand hygiene technique  - Identify and instruct in appropriate isolation precautions for identified infection/condition  Outcome: Progressing  Goal: Absence of fever/infection during neutropenic period  Description: INTERVENTIONS:  - Monitor WBC    Outcome: Progressing     Problem: SAFETY ADULT  Goal: Patient will remain free of falls  Description: INTERVENTIONS:  - Educate patient/family on patient safety including physical limitations  - Instruct patient to call for assistance with activity   - Consult OT/PT to assist with strengthening/mobility   - Keep Call bell within reach  - Keep bed low and locked with side rails adjusted as appropriate  - Keep care items and personal belongings within reach  - Initiate and maintain comfort rounds  - Make Fall Risk Sign visible to staff  - Offer Toileting every  Hours, in advance of need  - Initiate/Maintain alarm  - Obtain necessary fall risk management equipment:   - Apply yellow socks and bracelet for high fall risk patients  - Consider moving patient to room near nurses station  Outcome: Progressing  Goal: Maintain or return to baseline ADL function  Description: INTERVENTIONS:  -  Assess patient's ability to carry out ADLs; assess patient's baseline for ADL function and identify physical deficits which impact ability to perform ADLs (bathing, care of mouth/teeth, toileting, grooming, dressing, etc )  - Assess/evaluate cause of self-care deficits   - Assess range of motion  - Assess patient's mobility; develop plan if impaired  - Assess patient's need for assistive devices and provide as appropriate  - Encourage maximum independence but intervene and supervise when necessary  - Involve family in performance of ADLs  - Assess for home care needs following discharge   - Consider OT consult to assist with ADL evaluation and planning for discharge  - Provide patient education as appropriate  Outcome: Progressing  Goal: Maintains/Returns to pre admission functional level  Description: INTERVENTIONS:  - Perform BMAT or MOVE assessment daily    - Set and communicate daily mobility goal to care team and patient/family/caregiver  - Collaborate with rehabilitation services on mobility goals if consulted  - Perform Range of Motion  times a day  - Reposition patient every hours    - Dangle patient  times a day  - Stand patient  times a day  - Ambulate patient  times a day  - Out of bed to chair  times a day   - Out of bed for meals  times a day  - Out of bed for toileting  - Record patient progress and toleration of activity level   Outcome: Progressing     Problem: Knowledge Deficit  Goal: Patient/family/caregiver demonstrates understanding of disease process, treatment plan, medications, and discharge instructions  Description: Complete learning assessment and assess knowledge base   Interventions:  - Provide teaching at level of understanding  - Provide teaching via preferred learning methods  Outcome: Progressing     Problem: DISCHARGE PLANNING  Goal: Discharge to home or other facility with appropriate resources  Description: INTERVENTIONS:  - Identify barriers to discharge w/patient and caregiver  - Arrange for needed discharge resources and transportation as appropriate  - Identify discharge learning needs (meds, wound care, etc )  - Arrange for interpretive services to assist at discharge as needed  - Refer to Case Management Department for coordinating discharge planning if the patient needs post-hospital services based on physician/advanced practitioner order or complex needs related to functional status, cognitive ability, or social support system  Outcome: Progressing     Problem: POSTPARTUM  Goal: Experiences normal postpartum course  Description: INTERVENTIONS:  - Monitor maternal vital signs  - Assess uterine involution and lochia  Outcome: Progressing  Goal: Appropriate maternal -  bonding  Description: INTERVENTIONS:  - Identify family support  - Assess for appropriate maternal/infant bonding   -Encourage maternal/infant bonding opportunities  - Referral to  or  as needed  Outcome: Progressing  Goal: Establishment of infant feeding pattern  Description: INTERVENTIONS:  - Assess breast/bottle feeding  - Refer to lactation as needed  Outcome: Progressing  Goal: Incision(s), wounds(s) or drain site(s) healing without S/S of infection  Description: INTERVENTIONS  - Assess and document dressing, incision, wound bed, drain sites and surrounding tissue  - Provide patient and family education  - Perform skin care/dressing changes every   Outcome: Progressing

## 2022-05-19 NOTE — PLAN OF CARE
Problem: PAIN - ADULT  Goal: Verbalizes/displays adequate comfort level or baseline comfort level  Description: Interventions:  - Encourage patient to monitor pain and request assistance  - Assess pain using appropriate pain scale  - Administer analgesics based on type and severity of pain and evaluate response  - Implement non-pharmacological measures as appropriate and evaluate response  - Consider cultural and social influences on pain and pain management  - Notify physician/advanced practitioner if interventions unsuccessful or patient reports new pain  Outcome: Progressing     Problem: INFECTION - ADULT  Goal: Absence or prevention of progression during hospitalization  Description: INTERVENTIONS:  - Assess and monitor for signs and symptoms of infection  - Monitor lab/diagnostic results  - Monitor all insertion sites, i e  indwelling lines, tubes, and drains  - Monitor endotracheal if appropriate and nasal secretions for changes in amount and color  - [place] appropriate cooling/warming therapies per order  - Administer medications as ordered  - Instruct and encourage patient and family to use good hand hygiene technique  - Identify and instruct in appropriate isolation precautions for identified infection/condition  Outcome: Progressing  Goal: Absence of fever/infection during neutropenic period  Description: INTERVENTIONS:  - Monitor WBC    Outcome: Progressing     Problem: SAFETY ADULT  Goal: Patient will remain free of falls  Description: INTERVENTIONS:  - Educate patient/family on patient safety including physical limitations  - Instruct patient to call for assistance with activity   - Consult OT/PT to assist with strengthening/mobility   - Keep Call bell within reach  - Keep bed low and locked with side rails adjusted as appropriate  - Keep care items and personal belongings within reach  - Initiate and maintain comfort rounds  Outcome: Progressing  Goal: Maintain or return to baseline ADL function  Description: INTERVENTIONS:  -  Assess patient's ability to carry out ADLs; assess patient's baseline for ADL function and identify physical deficits which impact ability to perform ADLs (bathing, care of mouth/teeth, toileting, grooming, dressing, etc )  - Assess/evaluate cause of self-care deficits   - Assess range of motion  - Assess patient's mobility; develop plan if impaired  - Assess patient's need for assistive devices and provide as appropriate  - Encourage maximum independence but intervene and supervise when necessary  - Involve family in performance of ADLs  - Assess for home care needs following discharge   - Consider OT consult to assist with ADL evaluation and planning for discharge  - Provide patient education as appropriate  Outcome: Progressing  Goal: Maintains/Returns to pre admission functional level  Description: INTERVENTIONS:  - Perform BMAT or MOVE assessment daily    - Set and communicate daily mobility goal to care team and patient/family/caregiver  - Collaborate with rehabilitation services on mobility goals if consulted    - Out of bed for toileting  - Record patient progress and toleration of activity level   Outcome: Progressing     Problem: Knowledge Deficit  Goal: Patient/family/caregiver demonstrates understanding of disease process, treatment plan, medications, and discharge instructions  Description: Complete learning assessment and assess knowledge base    Interventions:  - Provide teaching at level of understanding  - Provide teaching via preferred learning methods  Outcome: Progressing     Problem: DISCHARGE PLANNING  Goal: Discharge to home or other facility with appropriate resources  Description: INTERVENTIONS:  - Identify barriers to discharge w/patient and caregiver  - Arrange for needed discharge resources and transportation as appropriate  - Identify discharge learning needs (meds, wound care, etc )  - Arrange for interpretive services to assist at discharge as needed  - Refer to Case Management Department for coordinating discharge planning if the patient needs post-hospital services based on physician/advanced practitioner order or complex needs related to functional status, cognitive ability, or social support system  Outcome: Progressing     Problem: POSTPARTUM  Goal: Experiences normal postpartum course  Description: INTERVENTIONS:  - Monitor maternal vital signs  - Assess uterine involution and lochia  Outcome: Progressing  Goal: Appropriate maternal -  bonding  Description: INTERVENTIONS:  - Identify family support  - Assess for appropriate maternal/infant bonding   -Encourage maternal/infant bonding opportunities  - Referral to  or  as needed  Outcome: Progressing  Goal: Establishment of infant feeding pattern  Description: INTERVENTIONS:  - Assess breast/bottle feeding  - Refer to lactation as needed  Outcome: Progressing  Goal: Incision(s), wounds(s) or drain site(s) healing without S/S of infection  Description: INTERVENTIONS  - Assess and document dressing, incision, wound bed, drain sites and surrounding tissue  - Provide patient and family education  - Perform skin care/dressing changes every day  Outcome: Progressing

## 2022-05-20 VITALS
DIASTOLIC BLOOD PRESSURE: 68 MMHG | HEIGHT: 66 IN | WEIGHT: 230 LBS | TEMPERATURE: 98 F | HEART RATE: 90 BPM | OXYGEN SATURATION: 93 % | SYSTOLIC BLOOD PRESSURE: 117 MMHG | BODY MASS INDEX: 36.96 KG/M2 | RESPIRATION RATE: 18 BRPM

## 2022-05-20 PROCEDURE — 99024 POSTOP FOLLOW-UP VISIT: CPT | Performed by: OBSTETRICS & GYNECOLOGY

## 2022-05-20 RX ORDER — DOCUSATE SODIUM 100 MG/1
100 CAPSULE, LIQUID FILLED ORAL 2 TIMES DAILY PRN
Refills: 0
Start: 2022-05-20

## 2022-05-20 RX ORDER — OXYCODONE HYDROCHLORIDE 5 MG/1
5 TABLET ORAL EVERY 4 HOURS PRN
Qty: 10 TABLET | Refills: 0 | Status: SHIPPED | OUTPATIENT
Start: 2022-05-20 | End: 2022-05-30

## 2022-05-20 RX ORDER — MEDROXYPROGESTERONE ACETATE 150 MG/ML
150 INJECTION, SUSPENSION INTRAMUSCULAR ONCE
Status: COMPLETED | OUTPATIENT
Start: 2022-05-20 | End: 2022-05-20

## 2022-05-20 RX ORDER — ACETAMINOPHEN 325 MG/1
650 TABLET ORAL EVERY 6 HOURS PRN
Qty: 6 TABLET | Refills: 0
Start: 2022-05-20

## 2022-05-20 RX ORDER — IBUPROFEN 600 MG/1
600 TABLET ORAL EVERY 6 HOURS PRN
Qty: 30 TABLET | Refills: 0
Start: 2022-05-20

## 2022-05-20 RX ADMIN — ACETAMINOPHEN 650 MG: 325 TABLET ORAL at 07:13

## 2022-05-20 RX ADMIN — IBUPROFEN 600 MG: 600 TABLET ORAL at 06:09

## 2022-05-20 RX ADMIN — MEDROXYPROGESTERONE ACETATE 150 MG: 150 INJECTION, SUSPENSION, EXTENDED RELEASE INTRAMUSCULAR at 11:49

## 2022-05-20 RX ADMIN — ACETAMINOPHEN 650 MG: 325 TABLET ORAL at 12:36

## 2022-05-20 RX ADMIN — DOCUSATE SODIUM 100 MG: 100 CAPSULE, LIQUID FILLED ORAL at 11:49

## 2022-05-20 RX ADMIN — ACETAMINOPHEN 650 MG: 325 TABLET ORAL at 01:12

## 2022-05-20 RX ADMIN — ENOXAPARIN SODIUM 40 MG: 40 INJECTION SUBCUTANEOUS at 11:50

## 2022-05-20 RX ADMIN — SIMETHICONE 80 MG: 80 TABLET, CHEWABLE ORAL at 06:09

## 2022-05-20 NOTE — PLAN OF CARE
Problem: PAIN - ADULT  Goal: Verbalizes/displays adequate comfort level or baseline comfort level  Description: Interventions:  - Encourage patient to monitor pain and request assistance  - Assess pain using appropriate pain scale  - Administer analgesics based on type and severity of pain and evaluate response  - Implement non-pharmacological measures as appropriate and evaluate response  - Consider cultural and social influences on pain and pain management  - Notify physician/advanced practitioner if interventions unsuccessful or patient reports new pain  Outcome: Adequate for Discharge     Problem: INFECTION - ADULT  Goal: Absence or prevention of progression during hospitalization  Description: INTERVENTIONS:  - Assess and monitor for signs and symptoms of infection  - Monitor lab/diagnostic results  - Monitor all insertion sites, i e  indwelling lines, tubes, and drains  - Monitor endotracheal if appropriate and nasal secretions for changes in amount and color  - [place] appropriate cooling/warming therapies per order  - Administer medications as ordered  - Instruct and encourage patient and family to use good hand hygiene technique  - Identify and instruct in appropriate isolation precautions for identified infection/condition  Outcome: Adequate for Discharge  Goal: Absence of fever/infection during neutropenic period  Description: INTERVENTIONS:  - Monitor WBC    Outcome: Adequate for Discharge     Problem: SAFETY ADULT  Goal: Patient will remain free of falls  Description: INTERVENTIONS:  - Educate patient/family on patient safety including physical limitations  - Instruct patient to call for assistance with activity   - Consult OT/PT to assist with strengthening/mobility   - Keep Call bell within reach  - Keep bed low and locked with side rails adjusted as appropriate  - Keep care items and personal belongings within reach  - Initiate and maintain comfort rounds  -  Outcome: Adequate for Discharge  Goal: Maintain or return to baseline ADL function  Description: INTERVENTIONS:  -  Assess patient's ability to carry out ADLs; assess patient's baseline for ADL function and identify physical deficits which impact ability to perform ADLs (bathing, care of mouth/teeth, toileting, grooming, dressing, etc )  - Assess/evaluate cause of self-care deficits   - Assess range of motion  - Assess patient's mobility; develop plan if impaired  - Assess patient's need for assistive devices and provide as appropriate  - Encourage maximum independence but intervene and supervise when necessary  - Involve family in performance of ADLs  - Assess for home care needs following discharge   - Consider OT consult to assist with ADL evaluation and planning for discharge  - Provide patient education as appropriate  Outcome: Adequate for Discharge  Goal: Maintains/Returns to pre admission functional level  Description: INTERVENTIONS:  - Perform BMAT or MOVE assessment daily    - Set and communicate daily mobility goal to care team and patient/family/caregiver  - Collaborate with rehabilitation services on mobility goals if consulted    - Out of bed for toileting  - Record patient progress and toleration of activity level   Outcome: Adequate for Discharge     Problem: Knowledge Deficit  Goal: Patient/family/caregiver demonstrates understanding of disease process, treatment plan, medications, and discharge instructions  Description: Complete learning assessment and assess knowledge base    Interventions:  - Provide teaching at level of understanding  - Provide teaching via preferred learning methods  Outcome: Adequate for Discharge     Problem: DISCHARGE PLANNING  Goal: Discharge to home or other facility with appropriate resources  Description: INTERVENTIONS:  - Identify barriers to discharge w/patient and caregiver  - Arrange for needed discharge resources and transportation as appropriate  - Identify discharge learning needs (meds, wound care, etc )  - Arrange for interpretive services to assist at discharge as needed  - Refer to Case Management Department for coordinating discharge planning if the patient needs post-hospital services based on physician/advanced practitioner order or complex needs related to functional status, cognitive ability, or social support system  Outcome: Adequate for Discharge     Problem: POSTPARTUM  Goal: Experiences normal postpartum course  Description: INTERVENTIONS:  - Monitor maternal vital signs  - Assess uterine involution and lochia  Outcome: Adequate for Discharge  Goal: Appropriate maternal -  bonding  Description: INTERVENTIONS:  - Identify family support  - Assess for appropriate maternal/infant bonding   -Encourage maternal/infant bonding opportunities  - Referral to  or  as needed  Outcome: Adequate for Discharge  Goal: Establishment of infant feeding pattern  Description: INTERVENTIONS:  - Assess breast/bottle feeding  - Refer to lactation as needed  Outcome: Adequate for Discharge  Goal: Incision(s), wounds(s) or drain site(s) healing without S/S of infection  Description: INTERVENTIONS  - Assess and document dressing, incision, wound bed, drain sites and surrounding tissue  - Provide patient and family education  - Perform skin care/dressing changes every day  Outcome: Adequate for Discharge

## 2022-05-20 NOTE — PLAN OF CARE
Problem: PAIN - ADULT  Goal: Verbalizes/displays adequate comfort level or baseline comfort level  Description: Interventions:  - Encourage patient to monitor pain and request assistance  - Assess pain using appropriate pain scale  - Administer analgesics based on type and severity of pain and evaluate response  - Implement non-pharmacological measures as appropriate and evaluate response  - Consider cultural and social influences on pain and pain management  - Notify physician/advanced practitioner if interventions unsuccessful or patient reports new pain  Outcome: Progressing     Problem: INFECTION - ADULT  Goal: Absence or prevention of progression during hospitalization  Description: INTERVENTIONS:  - Assess and monitor for signs and symptoms of infection  - Monitor lab/diagnostic results  - Monitor all insertion sites, i e  indwelling lines, tubes, and drains  - Monitor endotracheal if appropriate and nasal secretions for changes in amount and color  - [place] appropriate cooling/warming therapies per order  - Administer medications as ordered  - Instruct and encourage patient and family to use good hand hygiene technique  - Identify and instruct in appropriate isolation precautions for identified infection/condition  Outcome: Progressing  Goal: Absence of fever/infection during neutropenic period  Description: INTERVENTIONS:  - Monitor WBC    Outcome: Progressing     Problem: SAFETY ADULT  Goal: Patient will remain free of falls  Description: INTERVENTIONS:  - Educate patient/family on patient safety including physical limitations  - Instruct patient to call for assistance with activity   - Consult OT/PT to assist with strengthening/mobility   - Keep Call bell within reach  - Keep bed low and locked with side rails adjusted as appropriate  - Keep care items and personal belongings within reach  - Initiate and maintain comfort rounds  - Make Fall Risk Sign visible to staff  - Apply yellow socks and bracelet for high fall risk patients  - Consider moving patient to room near nurses station  Outcome: Progressing  Goal: Maintain or return to baseline ADL function  Description: INTERVENTIONS:  -  Assess patient's ability to carry out ADLs; assess patient's baseline for ADL function and identify physical deficits which impact ability to perform ADLs (bathing, care of mouth/teeth, toileting, grooming, dressing, etc )  - Assess/evaluate cause of self-care deficits   - Assess range of motion  - Assess patient's mobility; develop plan if impaired  - Assess patient's need for assistive devices and provide as appropriate  - Encourage maximum independence but intervene and supervise when necessary  - Involve family in performance of ADLs  - Assess for home care needs following discharge   - Consider OT consult to assist with ADL evaluation and planning for discharge  - Provide patient education as appropriate  Outcome: Progressing  Goal: Maintains/Returns to pre admission functional level  Description: INTERVENTIONS:  - Perform BMAT or MOVE assessment daily    - Set and communicate daily mobility goal to care team and patient/family/caregiver  - Collaborate with rehabilitation services on mobility goals if consulted  - Out of bed for toileting  - Record patient progress and toleration of activity level   Outcome: Progressing     Problem: Knowledge Deficit  Goal: Patient/family/caregiver demonstrates understanding of disease process, treatment plan, medications, and discharge instructions  Description: Complete learning assessment and assess knowledge base    Interventions:  - Provide teaching at level of understanding  - Provide teaching via preferred learning methods  Outcome: Progressing     Problem: DISCHARGE PLANNING  Goal: Discharge to home or other facility with appropriate resources  Description: INTERVENTIONS:  - Identify barriers to discharge w/patient and caregiver  - Arrange for needed discharge resources and transportation as appropriate  - Identify discharge learning needs (meds, wound care, etc )  - Arrange for interpretive services to assist at discharge as needed  - Refer to Case Management Department for coordinating discharge planning if the patient needs post-hospital services based on physician/advanced practitioner order or complex needs related to functional status, cognitive ability, or social support system  Outcome: Progressing     Problem: POSTPARTUM  Goal: Experiences normal postpartum course  Description: INTERVENTIONS:  - Monitor maternal vital signs  - Assess uterine involution and lochia  Outcome: Progressing  Goal: Appropriate maternal -  bonding  Description: INTERVENTIONS:  - Identify family support  - Assess for appropriate maternal/infant bonding   -Encourage maternal/infant bonding opportunities  - Referral to  or  as needed  Outcome: Progressing  Goal: Establishment of infant feeding pattern  Description: INTERVENTIONS:  - Assess breast/bottle feeding  - Refer to lactation as needed  Outcome: Progressing  Goal: Incision(s), wounds(s) or drain site(s) healing without S/S of infection  Description: INTERVENTIONS  - Assess and document dressing, incision, wound bed, drain sites and surrounding tissue  Outcome: Progressing

## 2022-05-20 NOTE — PROGRESS NOTES
Progress Note - OB/GYN  Mel Cordero 32 y o  female MRN: [id number]  Unit/Bed#: -01 Encounter: [id number]    ASSESSMENT:  Postpartum day #3 s/p 1LTCS, stable, baby in nursery  Blood pressures: 100s-110s/50s-60s    PLAN:  #1  Postoperative state s/p 1LTCS  -  mL, Hgb 13 7 --> 11 3  - Pain well controlled with oral analgesics  - Voiding spontaneously  - Tolerating PO fluids and solids  - Ambulating without difficulty  - DVT ppx with Lovenox 40 mg daily, SCDs    #2  GHTN  - 24 hr BP range 100s-110s/50s-60s  - Asymptomatic  - Plt 129 --> 102, Hgb 13 7 --> 11 3  - CMP wnl, UPC 0 19    #3  Thrombocytopenia  - Plt 129 --> 102  - No signs/symptoms of bleeding aside from normal postpartum lochia    #4  Contraception  - Depo (ordered)    #5  Continue routine post partum care  - Encourage ambulation  - Encourage breastfeeding  - Anticipate discharge PPD3 today pending baby's bilirubin      SUBJECTIVE:  Post delivery  Patient is doing well  Lochia WNL  Pain well controlled      Pain: yes, cramping, improved with meds  Tolerating PO: yes  Voiding: yes  Flatus: yes  BM: yes  Ambulating: yes  Breastfeeding: yes  Chest pain: no  Shortness of breath: no  Leg pain: no  Lochia: WNL      OBJECTIVE:    Vitals:  Vitals:    05/19/22 1222 05/19/22 1609 05/20/22 0004 05/20/22 0341   BP: 107/61 114/57 103/63    BP Location: Left arm Left arm Right arm    Pulse: 91 89 78    Resp: 18 18 18    Temp: 98 6 °F (37 °C) 98 4 °F (36 9 °C) 98 3 °F (36 8 °C)    TempSrc: Oral Oral Oral    SpO2:  98% 93%    Weight:    104 kg (230 lb)   Height:    5' 6" (1 676 m)         Intake/Output Summary (Last 24 hours) at 5/20/2022 0624  Last data filed at 5/19/2022 0815  Gross per 24 hour   Intake --   Output 450 ml   Net -450 ml       Lab Results   Component Value Date    WBC 16 53 (H) 05/18/2022    HGB 11 3 (L) 05/18/2022    HCT 32 7 (L) 05/18/2022    MCV 96 05/18/2022     (L) 05/18/2022       Physical Exam:  Gen: NAD  CV: warm and well perfused  Lungs: non-labored breathing  Abd: soft, non-tender, non-distended, no rebound or guarding  Uterine fundus: firm and non-tender, 2 cm below the umbilicus  Ext: non-tender    Incision: clean, dry, intact      José Miguel Godoy MD  OB/GYN PGY-1  5/20/2022  6:24 AM

## 2022-05-23 ENCOUNTER — TELEPHONE (OUTPATIENT)
Dept: OBGYN CLINIC | Facility: CLINIC | Age: 32
End: 2022-05-23

## 2022-05-23 NOTE — TELEPHONE ENCOUNTER
Transition of Care Post Partum phone call: Congratulations      Date of delivery: May 17, 2022  Weeks gestation: full term 37 weeks  Type of delivery:  section  Sex of child: male  Name of child:   Birth weight: 3530 gm   7lbs 12 5ounces  Perineum laceration: NA   Pediatric provider:  Vaginal bleeding status: light  Urinary status: voiding WNL  Bowel movement: Yes  Incision status: Yes healing well  Pain control: acetaminophen and narcotic analgesics including oxycodone   feeding: Bottle feeding and Using breast pump   Any baby blues: No  Scheduled for follow-up appointment: May 24, 2022

## 2022-05-23 NOTE — TELEPHONE ENCOUNTER
Pt reported "I think I may be allergic to the shots they gave me in the hospital in my stomach for my pregnancy"  Per pt rash started near first injection site and spread to second injection site  Appt offered - pt denied  Pt denied difficulty breathing, no fever and denied swelling  Per pt administered Benadryl as needed  RN advised pt to follow-up with PCP and call back if symptoms worsen and/or questions/concerns prior to tomorrow's incision check appt at [facility] at 0815  Pt had a verbal understanding and was comfortable with the plan

## 2022-05-24 ENCOUNTER — OFFICE VISIT (OUTPATIENT)
Dept: OBGYN CLINIC | Facility: CLINIC | Age: 32
End: 2022-05-24

## 2022-05-24 VITALS
SYSTOLIC BLOOD PRESSURE: 134 MMHG | WEIGHT: 222 LBS | HEART RATE: 82 BPM | HEIGHT: 66 IN | DIASTOLIC BLOOD PRESSURE: 87 MMHG | BODY MASS INDEX: 35.68 KG/M2

## 2022-05-24 DIAGNOSIS — Z09 POSTOP CHECK: ICD-10-CM

## 2022-05-24 DIAGNOSIS — R21 SKIN RASH: Primary | ICD-10-CM

## 2022-05-24 PROCEDURE — 99213 OFFICE O/P EST LOW 20 MIN: CPT | Performed by: NURSE PRACTITIONER

## 2022-05-24 NOTE — PROGRESS NOTES
Assessment/Plan:    No problem-specific Assessment & Plan notes found for this encounter  Diagnoses and all orders for this visit:    Skin rash  Suspect reaction from abdominal binder  May use 1% hydrocortisone cream sparingly to rash, continue with Benadryl as needed  Follow-up in 1 week at her next scheduled appointment for incision check  Review to call if rash worsens  Postop check  Incision healing well        Subjective:      Patient ID: Emiliano Douglass is a 32 y o  female  HPI 33 yo here for rash on her abdomen  She noticed it while she was in the hospital She has been using Benadryl for itching, has also used anti-itch cream but has not been helping much  Patient does have history of allergy to medical tape  Rash is an area of abdominal binder which she did use previously  She denies any rash or itching and other parts of her body  Denies any use of any new products  Pt had a Primary C/s 5/17/2022 at 37 wks and 4 d, she had a baby boy that weighed 7 lbs 12 5 oz  Breech presentation  [de-identified] weight name is "Genevieve "  She is bottle feeding with Similac and also pumping  Pregnancy complicated by GHTN, tobacco use, cervical shortening, thrombocytopenia  Her blood pressure today is 134/87  She does report headache yesterday and then this morning but very mild, she denies any visual changes  Reviewed precautions with patient  Will have patient return to office in 1 week for blood pressure check with her incision check which she already has scheduled  Her preop hgb was 13 4 and postop was 11 3   Her pain is controlled uses oxycodone at night and  Tylenol//ibuprofen as needed  She did receive Depo-Provera postpartum      The following portions of the patient's history were reviewed and updated as appropriate: allergies, current medications, past family history, past medical history, past social history, past surgical history and problem list     Review of Systems   Constitutional: Negative for chills and fever  Eyes: Negative for photophobia and visual disturbance  Respiratory: Negative  Cardiovascular: Negative  Gastrointestinal: Negative for abdominal pain, nausea and vomiting  Genitourinary: Positive for pelvic pain  Negative for dysuria, flank pain and vaginal discharge  Incisional discomfort from recent surgery   Neurological: Positive for headaches  Negative for dizziness and light-headedness  Objective:      /87 (BP Location: Left arm, Patient Position: Sitting, Cuff Size: Adult)   Pulse 82   Ht 5' 6" (1 676 m)   Wt 101 kg (222 lb)   LMP 08/27/2021 (Exact Date)   BMI 35 83 kg/m²          Physical Exam  Constitutional:       Appearance: Normal appearance  Cardiovascular:      Rate and Rhythm: Normal rate  Pulmonary:      Effort: Pulmonary effort is normal    Abdominal:      Palpations: Abdomen is soft  Skin:     General: Skin is warm and dry  Neurological:      Mental Status: She is oriented to person, place, and time  Psychiatric:         Mood and Affect: Mood normal          Behavior: Behavior normal        incision-healing well,  glue intact, drainage or erythema  Small bruise noted below incision on mons pubis  No collections noted    Abdomen-slightly raised reddened rash on right and left abdomen, the warm to touch, is itching    Rash is more pronounced on left abdomen

## 2022-05-30 NOTE — PROGRESS NOTES
Assessment/Plan:    No problem-specific Assessment & Plan notes found for this encounter  Diagnoses and all orders for this visit:    Postop check  Return to office in 2 weeks for postpartum check        Subjective:      Patient ID: Eunice Brizuela is a 32 y o  female  HPI 31 yo   here for an incision check  She had a Primary  C/S 2022, she had a baby boy 7lbs 12 5 oz, that was breech  position named "Genevieve"  She was seen for a skin rash on 2022 thought to be a reaction from her abdominal binder  Was to use 1% hydrocortisone cream and Benadryl as needed  This has helped and rash left side is barely visible, denies any further itching  Her pregnancy was complicated by G HTN, tobacco use, cervical shortening, thrombocytopenia  Her preop hemoglobin was 13 4 and a postop was 11 3  Pain is minimal   Denies any problems with incision  She denies any headaches or visual changes, chest pain shortness of breath  She received Depo-Provera postpartum 2022, has no vaginal bleeding  Is bottle feeding denies any problems with formula  Denies any breast symptoms    The following portions of the patient's history were reviewed and updated as appropriate: allergies, current medications, past family history, past medical history, past social history, past surgical history and problem list     Review of Systems   Constitutional: Negative for chills and fever  Respiratory: Negative  Cardiovascular: Negative  Gastrointestinal: Negative for abdominal pain  Genitourinary: Positive for pelvic pain  Negative for dysuria and vaginal discharge  Slight incision discomfort         Objective:      /86 (BP Location: Right arm, Patient Position: Sitting, Cuff Size: Adult)   Pulse 90   Ht 5' 6" (1 676 m)   Wt 97 5 kg (215 lb)   LMP 2021 (Exact Date)   BMI 34 70 kg/m²          Physical Exam  Constitutional:       Appearance: Normal appearance     Cardiovascular:      Rate and Rhythm: Normal rate  Pulmonary:      Effort: Pulmonary effort is normal    Abdominal:      Palpations: Abdomen is soft  Tenderness: There is no abdominal tenderness  Skin:     General: Skin is warm and dry     Psychiatric:         Mood and Affect: Mood normal          Behavior: Behavior normal        incision-no erythema or drainage, nontender, no collections noted,  glue intact

## 2022-05-31 ENCOUNTER — OFFICE VISIT (OUTPATIENT)
Dept: OBGYN CLINIC | Facility: CLINIC | Age: 32
End: 2022-05-31

## 2022-05-31 VITALS
DIASTOLIC BLOOD PRESSURE: 86 MMHG | WEIGHT: 215 LBS | HEART RATE: 90 BPM | BODY MASS INDEX: 34.55 KG/M2 | SYSTOLIC BLOOD PRESSURE: 126 MMHG | HEIGHT: 66 IN

## 2022-05-31 DIAGNOSIS — Z09 POSTOP CHECK: Primary | ICD-10-CM

## 2022-05-31 PROBLEM — O99.333 TOBACCO SMOKING AFFECTING PREGNANCY IN THIRD TRIMESTER: Status: RESOLVED | Noted: 2022-01-14 | Resolved: 2022-05-31

## 2022-05-31 PROBLEM — O99.820 GBS (GROUP B STREPTOCOCCUS CARRIER), +RV CULTURE, CURRENTLY PREGNANT: Status: RESOLVED | Noted: 2022-05-08 | Resolved: 2022-05-31

## 2022-05-31 PROBLEM — O99.213 OBESITY AFFECTING PREGNANCY IN THIRD TRIMESTER: Status: RESOLVED | Noted: 2021-12-02 | Resolved: 2022-05-31

## 2022-05-31 PROBLEM — Z3A.37 37 WEEKS GESTATION OF PREGNANCY: Status: RESOLVED | Noted: 2022-04-20 | Resolved: 2022-05-31

## 2022-05-31 PROBLEM — O26.872 CERVICAL SHORTENING, SECOND TRIMESTER: Status: RESOLVED | Noted: 2022-01-14 | Resolved: 2022-05-31

## 2022-05-31 PROBLEM — Z34.93 PRENATAL CARE IN THIRD TRIMESTER: Status: RESOLVED | Noted: 2022-05-05 | Resolved: 2022-05-31

## 2022-05-31 PROCEDURE — 3008F BODY MASS INDEX DOCD: CPT | Performed by: NURSE PRACTITIONER

## 2022-05-31 PROCEDURE — 99213 OFFICE O/P EST LOW 20 MIN: CPT | Performed by: NURSE PRACTITIONER

## 2022-06-08 ENCOUNTER — TELEPHONE (OUTPATIENT)
Dept: OBGYN CLINIC | Facility: CLINIC | Age: 32
End: 2022-06-08

## 2022-06-08 NOTE — TELEPHONE ENCOUNTER
Sandra from [address] left VM at [facility] re: pt's date of delivery and delivery method for disability insurance  RN called back [address] and spoke to [address] with requested information and advised her that the pt's form was completed by the provider and faxed over yesterday for their office to review  Sandra had a verbal understanding and was comfortable with the plan

## 2022-06-13 NOTE — PROGRESS NOTES
Subjective     Dionicio Stewart is a 32 y o  y o  female  who presents for a postpartum visit  She is 4 weeks postpartum following a low cervical transverse  section on 2022  She had a baby boy that weighed 7 lb 12 point oz, was breech position, her baby's name is ' Genevieve"  Her pregnancy was complicated by G HTN, tobacco use, cervical shortening, thrombocytopenia  Her preop hemoglobin was 13 4 and postop was 11  3  her BP today is 137/86, states she just drank a energy drink due to baby awake during the night and slept only 4 hours  She denies any HA's, visual changes, SOB, chestpain or swelling in extremities  Did have lower abdominal pain yesterday and some today, feels like she may have ended her bleeding, light flow    I have fully reviewed the prenatal and intrapartum course  The delivery was at 37wk4d gestational weeks  Outcome: primary  section, low transverse incision  Anesthesia: spinal  Postpartum course has been uneventful except for rash on her abdomen caused from abdominal binder  Baby's course has been formula change and fusiness  Baby is feeding by similac sensitive  Bleeding staining only  Bowel function is normal  Bladder function is normal  Patient is not sexually active  Contraception method is Depo-Provera injections  Postpartum depression screening: negative  Scored a 6,  Sees [facility] for her mental health- has appt today  She is on Prozac 20 mgs daily, sometimes forgets to take  The following portions of the patient's history were reviewed and updated as appropriate: allergies, current medications, past family history, past medical history, past social history, past surgical history and problem list     Review of Systems  Pertinent items are noted in HPI       Objective     /86   Pulse 73   Temp 98 °F (36 7 °C)   Ht 5' 6" (1 676 m)   Wt 98 4 kg (217 lb)   BMI 35 02 kg/m²     General:   appears stated age, cooperative, alert normal mood and affect   Neck: Neck: normal, supple,trachea midline, no masses   Heart: regular rate and rhythm, S1, S2 normal, no murmur, click, rub or gallop   Lungs: clear to auscultation bilaterally   Breasts: Denies concerns   Abdomen: soft, non-tender, without masses or organomegaly  Incision well healed, nontender  Vulva: Deferred, denies concerns   Vagina:    Urethra:    Cervix:    Uterus:    Adnexa:      Assessment/Plan     Four weeks postpartum exam  Primary C/S done for breech  Pap smear not done at today's visit  Her last Pap and HPV was 11/19/2021 and was negative  Annual due in November 1  Contraception: Depo-Provera injections she received Depo-Provera 05/20/2022 postpartum  2  Depo-Provera injection due 08/05/2022, prescription renewed, due for annual 11/2022  Reviewed to call with any concerns  3  Follow up in: 5 months or as needed  4  Plans to go back to work mid July  5  Prenatal vitamins until finished  6  Follows at [facility] for her mental health in [place]

## 2022-06-14 ENCOUNTER — POSTPARTUM VISIT (OUTPATIENT)
Dept: OBGYN CLINIC | Facility: CLINIC | Age: 32
End: 2022-06-14

## 2022-06-14 VITALS
SYSTOLIC BLOOD PRESSURE: 137 MMHG | HEART RATE: 73 BPM | DIASTOLIC BLOOD PRESSURE: 86 MMHG | TEMPERATURE: 98 F | WEIGHT: 217 LBS | HEIGHT: 66 IN | BODY MASS INDEX: 34.87 KG/M2

## 2022-06-14 DIAGNOSIS — Z98.891 S/P PRIMARY LOW TRANSVERSE C-SECTION: ICD-10-CM

## 2022-06-14 DIAGNOSIS — Z30.42 ENCOUNTER FOR SURVEILLANCE OF INJECTABLE CONTRACEPTIVE: ICD-10-CM

## 2022-06-14 PROCEDURE — 3008F BODY MASS INDEX DOCD: CPT | Performed by: NURSE PRACTITIONER

## 2022-06-14 PROCEDURE — 99024 POSTOP FOLLOW-UP VISIT: CPT | Performed by: NURSE PRACTITIONER

## 2022-06-14 RX ORDER — MEDROXYPROGESTERONE ACETATE 150 MG/ML
150 INJECTION, SUSPENSION INTRAMUSCULAR
Qty: 1 ML | Refills: 2 | Status: SHIPPED | OUTPATIENT
Start: 2022-08-05

## 2022-08-18 ENCOUNTER — CLINICAL SUPPORT (OUTPATIENT)
Dept: OBGYN CLINIC | Facility: CLINIC | Age: 32
End: 2022-08-18

## 2022-08-18 DIAGNOSIS — Z30.42 ENCOUNTER FOR SURVEILLANCE OF INJECTABLE CONTRACEPTIVE: ICD-10-CM

## 2022-08-18 LAB — SL AMB POCT URINE HCG: NEGATIVE

## 2022-08-18 PROCEDURE — 81025 URINE PREGNANCY TEST: CPT

## 2022-08-18 PROCEDURE — 96372 THER/PROPH/DIAG INJ SC/IM: CPT

## 2022-08-18 RX ORDER — MEDROXYPROGESTERONE ACETATE 150 MG/ML
150 INJECTION, SUSPENSION INTRAMUSCULAR
Status: SHIPPED | OUTPATIENT
Start: 2022-08-18

## 2022-08-18 RX ADMIN — MEDROXYPROGESTERONE ACETATE 150 MG: 150 INJECTION, SUSPENSION INTRAMUSCULAR at 10:02

## 2022-08-18 NOTE — PROGRESS NOTES
Depo-Provera      [x]   Patient provided box yes   o 2 Refills remain  o Refills submitted no   Last  Annual Date / Birth control check : 11/2021   Last Depo date: 5/5/2022   Side effects: no   HCG: yes  o if applicable: negative   Given by: ev   Site: left deltoid    o Calcium supplement daily teaching  o Condoms for 2 weeks following first injection dose

## 2022-11-08 ENCOUNTER — APPOINTMENT (OUTPATIENT)
Dept: RADIOLOGY | Facility: MEDICAL CENTER | Age: 32
End: 2022-11-08

## 2022-11-08 ENCOUNTER — OFFICE VISIT (OUTPATIENT)
Dept: FAMILY MEDICINE CLINIC | Facility: CLINIC | Age: 32
End: 2022-11-08

## 2022-11-08 VITALS
BODY MASS INDEX: 35.68 KG/M2 | TEMPERATURE: 98.6 F | RESPIRATION RATE: 18 BRPM | DIASTOLIC BLOOD PRESSURE: 80 MMHG | HEIGHT: 66 IN | SYSTOLIC BLOOD PRESSURE: 126 MMHG | WEIGHT: 222 LBS | OXYGEN SATURATION: 99 % | HEART RATE: 75 BPM

## 2022-11-08 DIAGNOSIS — M54.40 CHRONIC LOW BACK PAIN WITH SCIATICA, SCIATICA LATERALITY UNSPECIFIED, UNSPECIFIED BACK PAIN LATERALITY: Primary | ICD-10-CM

## 2022-11-08 DIAGNOSIS — M62.838 MUSCLE SPASM: ICD-10-CM

## 2022-11-08 DIAGNOSIS — M54.40 CHRONIC LOW BACK PAIN WITH SCIATICA, SCIATICA LATERALITY UNSPECIFIED, UNSPECIFIED BACK PAIN LATERALITY: ICD-10-CM

## 2022-11-08 DIAGNOSIS — G89.29 CHRONIC LOW BACK PAIN WITH SCIATICA, SCIATICA LATERALITY UNSPECIFIED, UNSPECIFIED BACK PAIN LATERALITY: ICD-10-CM

## 2022-11-08 DIAGNOSIS — G89.29 CHRONIC LOW BACK PAIN WITH SCIATICA, SCIATICA LATERALITY UNSPECIFIED, UNSPECIFIED BACK PAIN LATERALITY: Primary | ICD-10-CM

## 2022-11-08 RX ORDER — PREDNISONE 10 MG/1
10 TABLET ORAL 2 TIMES DAILY WITH MEALS
Qty: 10 TABLET | Refills: 0 | Status: SHIPPED | OUTPATIENT
Start: 2022-11-08 | End: 2022-11-13

## 2022-11-08 RX ORDER — CYCLOBENZAPRINE HCL 10 MG
10 TABLET ORAL 3 TIMES DAILY PRN
Qty: 21 TABLET | Refills: 0 | Status: SHIPPED | OUTPATIENT
Start: 2022-11-08

## 2022-11-08 NOTE — PROGRESS NOTES
Name: Drew Vega      : 1990      MRN: [id number]  Encounter Provider: LUIS Paul  Encounter Date: 2022   Encounter department: [address] [place] [address]    Assessment & Plan     1  Chronic low back pain with sciatica, sciatica laterality unspecified, unspecified back pain laterality  -     XR spine lumbar 2 or 3 views injury; Future; Expected date: 2022  -     cyclobenzaprine (FLEXERIL) 10 mg tablet; Take 1 tablet (10 mg total) by mouth 3 (three) times a day as needed for muscle spasms  -     predniSONE 10 mg tablet; Take 1 tablet (10 mg total) by mouth 2 (two) times a day with meals for 5 days    2  Muscle spasm  -     XR spine lumbar 2 or 3 views injury; Future; Expected date: 2022  -     cyclobenzaprine (FLEXERIL) 10 mg tablet; Take 1 tablet (10 mg total) by mouth 3 (three) times a day as needed for muscle spasms  -     predniSONE 10 mg tablet; Take 1 tablet (10 mg total) by mouth 2 (two) times a day with meals for 5 days        Depression Screening and Follow-up Plan: Patient was screened for depression during today's encounter  They screened negative with a PHQ-2 score of 0  Physical assessment is unremarkable DTRs remain intact  Did advise the subjective pain and discomfort to muscle spasms can be treated with Flexeril 10 mg b i d  Or t i d  As needed  Also will send a little oral steroids for inflammation  Did advised patient that I would like for her to get the lumbar spinal series completed will contact her with results when available  Otherwise activity as tolerated return very as needed  Dosing all possible side effects of the prescribed medications or medications that had been prescribed in the past were reviewed and all questions were answered  Patient verbalized agreement and understanding of the plan of care as outlined during the office visit today return to office as indicated or sooner if a problem arises      Subjective      Patient is here with a complaint of lower back pain  She does have some degree of sciatic bilaterally  Does have a past medical history of disc narrowing of the lumbar spine  She recently had a baby approximately 6 months ago problem is a reoccurring and bothering her  She denies any other related symptoms she denies any change in bowel or bladder habits  Review of Systems   Constitutional: Negative for appetite change and fever  HENT: Negative for sinus pressure and sore throat  Eyes: Negative for pain  Respiratory: Negative for shortness of breath  Cardiovascular: Negative for chest pain  Gastrointestinal: Negative for abdominal pain  Genitourinary: Negative for dysuria  Musculoskeletal: Positive for back pain  Negative for arthralgias and myalgias  Skin: Negative for color change  Neurological: Negative for light-headedness  Psychiatric/Behavioral: Negative for behavioral problems  Current Outpatient Medications on File Prior to Visit   Medication Sig   • medroxyPROGESTERone (DEPO-PROVERA) 150 mg/mL injection Inject 1 mL (150 mg total) into a muscle every 3 (three) months   • acetaminophen (TYLENOL) 325 mg tablet Take 2 tablets (650 mg total) by mouth every 6 (six) hours as needed for mild pain or moderate pain for up to 3 doses (Patient not taking: No sig reported)   • docusate sodium (COLACE) 100 mg capsule Take 1 capsule (100 mg total) by mouth as needed in the morning and 1 capsule (100 mg total) as needed in the evening for constipation   (Patient not taking: No sig reported)   • FLUoxetine (PROzac) 20 mg capsule  (Patient not taking: Reported on 11/8/2022)   • ibuprofen (MOTRIN) 600 mg tablet Take 1 tablet (600 mg total) by mouth every 6 (six) hours as needed for moderate pain or mild pain (Patient not taking: No sig reported)   • Prenatal Vit-DSS-Fe Fum-FA (Prenatal 19) tablet Take 1 tablet by mouth daily  (Patient not taking: No sig reported)       Objective     /80 (BP Location: Left arm, Patient Position: Sitting, Cuff Size: Large)   Pulse 75   Temp 98 6 °F (37 °C) (Temporal)   Resp 18   Ht 5' 6" (1 676 m)   Wt 101 kg (222 lb)   SpO2 99%   Breastfeeding No   BMI 35 83 kg/m²     Physical Exam  Vitals and nursing note reviewed  Constitutional:       General: She is not in acute distress  Appearance: She is well-developed  She is not diaphoretic  HENT:      Head: Normocephalic and atraumatic  Right Ear: External ear normal       Left Ear: External ear normal       Mouth/Throat:      Pharynx: Oropharynx is clear  Eyes:      Pupils: Pupils are equal, round, and reactive to light  Cardiovascular:      Rate and Rhythm: Normal rate and regular rhythm  Heart sounds: Normal heart sounds  Pulmonary:      Effort: Pulmonary effort is normal       Breath sounds: Normal breath sounds  Abdominal:      Palpations: Abdomen is soft  Musculoskeletal:         General: Tenderness (lumbar) present  Normal range of motion  Cervical back: Normal range of motion  Skin:     General: Skin is dry  Neurological:      General: No focal deficit present  Mental Status: She is alert and oriented to person, place, and time  Psychiatric:         Behavior: Behavior normal          Thought Content:  Thought content normal        LUIS Duffy

## 2022-11-11 ENCOUNTER — CLINICAL SUPPORT (OUTPATIENT)
Dept: OBGYN CLINIC | Facility: CLINIC | Age: 32
End: 2022-11-11

## 2022-11-11 DIAGNOSIS — Z30.42 ENCOUNTER FOR SURVEILLANCE OF INJECTABLE CONTRACEPTIVE: ICD-10-CM

## 2022-11-11 RX ADMIN — MEDROXYPROGESTERONE ACETATE 150 MG: 150 INJECTION, SUSPENSION INTRAMUSCULAR at 09:08

## 2022-11-11 NOTE — PROGRESS NOTES
Depo-Provera     • [x]   Patient provided box YES   o 1 Refills remain  o Refills submitted NO  • Last  Annual Date / Birth control check :  11/19/2021  • Last Depo date: 8/18/2022  • Side effects: NO  • HCG: NO  • Given by: Eleuterio Carrion MA  • Site: Right Deltoid     o Calcium supplement daily teaching  o Condoms for 2 weeks following first injection dose

## 2022-11-16 ENCOUNTER — TELEPHONE (OUTPATIENT)
Dept: FAMILY MEDICINE CLINIC | Facility: CLINIC | Age: 32
End: 2022-11-16

## 2022-11-16 NOTE — TELEPHONE ENCOUNTER
Pt  called back regarding her X-ray results, I informed her of results, she will let us know if she feels she wants to proceed with therapy

## 2023-02-03 ENCOUNTER — CLINICAL SUPPORT (OUTPATIENT)
Dept: OBGYN CLINIC | Facility: CLINIC | Age: 33
End: 2023-02-03

## 2023-02-03 ENCOUNTER — TELEPHONE (OUTPATIENT)
Dept: OBGYN CLINIC | Facility: CLINIC | Age: 33
End: 2023-02-03

## 2023-02-03 DIAGNOSIS — Z30.42 ENCOUNTER FOR SURVEILLANCE OF INJECTABLE CONTRACEPTIVE: ICD-10-CM

## 2023-02-03 RX ORDER — MEDROXYPROGESTERONE ACETATE 150 MG/ML
150 INJECTION, SUSPENSION INTRAMUSCULAR
Qty: 1 ML | Refills: 0 | Status: SHIPPED | OUTPATIENT
Start: 2023-02-03 | End: 2023-04-27 | Stop reason: SDUPTHER

## 2023-02-03 RX ADMIN — MEDROXYPROGESTERONE ACETATE 150 MG: 150 INJECTION, SUSPENSION INTRAMUSCULAR at 08:37

## 2023-02-03 NOTE — PROGRESS NOTES
Depo-Provera     • [x]   Patient provided box YES   o 0 Refills remain  o Refills submitted NO   • Last  Annual Date / Birth control check : 11/2021  • Last Depo date: 11/11/22  • Side effects: NO  • HCG: NO   • Given by: Yaquelin Penny MA  • Site: Left Deltoid     o Calcium supplement daily teaching  o Condoms for 2 weeks following first injection dose

## 2023-04-06 ENCOUNTER — NURSE TRIAGE (OUTPATIENT)
Dept: PHYSICAL THERAPY | Facility: OTHER | Age: 33
End: 2023-04-06

## 2023-04-06 ENCOUNTER — OFFICE VISIT (OUTPATIENT)
Dept: FAMILY MEDICINE CLINIC | Facility: CLINIC | Age: 33
End: 2023-04-06

## 2023-04-06 VITALS
DIASTOLIC BLOOD PRESSURE: 80 MMHG | TEMPERATURE: 98.5 F | OXYGEN SATURATION: 98 % | WEIGHT: 219 LBS | HEIGHT: 66 IN | SYSTOLIC BLOOD PRESSURE: 120 MMHG | HEART RATE: 84 BPM | RESPIRATION RATE: 18 BRPM | BODY MASS INDEX: 35.2 KG/M2

## 2023-04-06 DIAGNOSIS — M54.40 CHRONIC LOW BACK PAIN WITH SCIATICA, SCIATICA LATERALITY UNSPECIFIED, UNSPECIFIED BACK PAIN LATERALITY: ICD-10-CM

## 2023-04-06 DIAGNOSIS — G89.29 CHRONIC LOW BACK PAIN WITH SCIATICA, SCIATICA LATERALITY UNSPECIFIED, UNSPECIFIED BACK PAIN LATERALITY: ICD-10-CM

## 2023-04-06 DIAGNOSIS — M54.41 CHRONIC BILATERAL LOW BACK PAIN WITH RIGHT-SIDED SCIATICA: Primary | ICD-10-CM

## 2023-04-06 DIAGNOSIS — Z00.00 ENCOUNTER FOR PREVENTATIVE ADULT HEALTH CARE EXAMINATION: Primary | ICD-10-CM

## 2023-04-06 DIAGNOSIS — G89.29 CHRONIC BILATERAL LOW BACK PAIN WITH RIGHT-SIDED SCIATICA: Primary | ICD-10-CM

## 2023-04-06 DIAGNOSIS — M62.838 MUSCLE SPASM: ICD-10-CM

## 2023-04-06 DIAGNOSIS — F32.0 CURRENT MILD EPISODE OF MAJOR DEPRESSIVE DISORDER, UNSPECIFIED WHETHER RECURRENT (HCC): ICD-10-CM

## 2023-04-06 RX ORDER — CYCLOBENZAPRINE HCL 10 MG
10 TABLET ORAL 3 TIMES DAILY PRN
Qty: 30 TABLET | Refills: 0 | Status: SHIPPED | OUTPATIENT
Start: 2023-04-06

## 2023-04-06 RX ORDER — FLUOXETINE HYDROCHLORIDE 40 MG/1
CAPSULE ORAL
COMMUNITY
Start: 2023-03-30

## 2023-04-06 NOTE — PROGRESS NOTES
Name: Danish Rogers      : 1990      MRN: [id number]  Encounter Provider: LUIS Soria  Encounter Date: 2023   Encounter department: [address] [place] [address] Place     1  Encounter for preventative adult health care examination  -     CBC and differential; Future  -     Comprehensive metabolic panel; Future  -     Lipid panel; Future  -     TSH, 3rd generation; Future  -     UA w Reflex to Microscopic w Reflex to Culture    2  Current mild episode of major depressive disorder, unspecified whether recurrent ([facility] [address] )    3  Chronic low back pain with sciatica, sciatica laterality unspecified, unspecified back pain laterality  -     cyclobenzaprine (FLEXERIL) 10 mg tablet; Take 1 tablet (10 mg total) by mouth 3 (three) times a day as needed for muscle spasms  -     Ambulatory Referral to Comprehensive Spine Program; Future    4  Muscle spasm  -     cyclobenzaprine (FLEXERIL) 10 mg tablet; Take 1 tablet (10 mg total) by mouth 3 (three) times a day as needed for muscle spasms  -     Ambulatory Referral to Comprehensive Spine Program; Future    Physical assessment unremarkable except were noted  Vital signs are well within normal limits no labs to review at this office visit  Patient does have an incidental complaint about low back pain did request that she be sent to the spine center there are current x-rays in her chart from 2022 they can be referenced  Also cyclobenzaprine was given refill due to the muscle spasms she is having in conjunction with this  This was her health physical return to office in 1 year or sooner if need be  Subjective       Patient is here for routine follow-up  To review most recent labs  To also discuss current state of health and any new problems that they may be experiencing  Patient states that medications taken as prescribed and very well tolerated no new complaints at this time        Review of Systems   Constitutional: Negative for appetite change and fever  HENT: Negative for sinus pressure and sore throat  Eyes: Negative for pain  Respiratory: Negative for shortness of breath  Cardiovascular: Negative for chest pain  Gastrointestinal: Negative for abdominal pain  Genitourinary: Negative for dysuria  Musculoskeletal: Positive for back pain  Negative for arthralgias and myalgias  Skin: Negative for color change  Neurological: Negative for light-headedness  Psychiatric/Behavioral: Negative for behavioral problems  Past Medical History:   Diagnosis Date   • 37 weeks gestation of pregnancy 2022   • Abnormal Pap smear of cervix    • Breech presentation 2022    Desires primary , declines EV   • Depression    • GBS (group B Streptococcus carrier), +RV culture, currently pregnant 2022   • Gestational hypertension 2022   • Kidney stone    • Seasonal allergies    • Tobacco smoking affecting pregnancy in third trimester 2022     Past Surgical History:   Procedure Laterality Date   • IR IMAGE GUIDED ASPIRATION / DRAINAGE  3/22/2019   • OH BREAST REDUCTION Bilateral 3/12/2019    Procedure: REDUCTION MAMMOPLASTY BREAST;  Surgeon: Vaishnavi Lobo MD;  Location: [facility];  Service: Plastics   • OH  DELIVERY ONLY N/A 2022    Procedure:  SECTION ();   Surgeon: Ariadne Archuleta DO;  Location: [facility];  Service: Obstetrics   • THROAT SURGERY      as a child to remove abcess; cyst removal   • WISDOM TOOTH EXTRACTION      3/4     Family History   Problem Relation Age of Onset   • Hypertension Mother    • No Known Problems Father    • Cervical cancer Maternal Grandmother    • Diabetes Maternal Grandmother    • Kidney disease Maternal Grandmother    • Cancer Maternal Grandmother    • Depression Sister    • Polycystic ovary syndrome Sister    • No Known Problems Daughter    • Depression Maternal Grandfather      Social History     Socioeconomic History   • Marital status: Single     Spouse name: None   • Number of children: None   • Years of education: 15   • Highest education level: None   Occupational History   • Occupation: [employer]   Tobacco Use   • Smoking status: Every Day     Packs/day: 0 25     Years: 11 00     Pack years: 2 75     Types: Cigarettes   • Smokeless tobacco: Never   • Tobacco comments:      smoked since age 15- per [place]   Vaping Use   • Vaping Use: Never used   Substance and Sexual Activity   • Alcohol use: Not Currently     Comment: socially   • Drug use: No   • Sexual activity: Yes     Partners: Male     Birth control/protection: None   Other Topics Concern   • None   Social History Narrative    · Most recent tobacco use screenin2019        · Sexual orientation:   Heterosexual      · Exercise level: Moderate      · Diet:   Regular      · General stress level:   High        · Alcohol intake:   Occasional      · Caffeine intake: Moderate       · Guns present in home:   No      · Seat belts used routinely:   Yes      · Sunscreen used routinely:   No      · Smoke alarm in home:    Yes      · Advance directive:   No      Social Determinants of Health     Financial Resource Strain: Not on file   Food Insecurity: Not on file   Transportation Needs: Not on file   Physical Activity: Not on file   Stress: Not on file   Social Connections: Not on file   Intimate Partner Violence: Not on file   Housing Stability: Not on file     Current Outpatient Medications on File Prior to Visit   Medication Sig   • FLUoxetine (PROzac) 40 MG capsule    • medroxyPROGESTERone (DEPO-PROVERA) 150 mg/mL injection Inject 1 mL (150 mg total) into a muscle every 3 (three) months   • [DISCONTINUED] cyclobenzaprine (FLEXERIL) 10 mg tablet Take 1 tablet (10 mg total) by mouth 3 (three) times a day as needed for muscle spasms   • acetaminophen (TYLENOL) 325 mg tablet Take 2 tablets (650 mg total) by mouth every 6 (six) hours as needed for mild pain or moderate pain "for up to 3 doses (Patient not taking: Reported on 6/14/2022)   • [DISCONTINUED] docusate sodium (COLACE) 100 mg capsule Take 1 capsule (100 mg total) by mouth as needed in the morning and 1 capsule (100 mg total) as needed in the evening for constipation  (Patient not taking: No sig reported)   • [DISCONTINUED] FLUoxetine (PROzac) 20 mg capsule  (Patient not taking: Reported on 11/8/2022)   • [DISCONTINUED] ibuprofen (MOTRIN) 600 mg tablet Take 1 tablet (600 mg total) by mouth every 6 (six) hours as needed for moderate pain or mild pain (Patient not taking: No sig reported)   • [DISCONTINUED] Prenatal Vit-DSS-Fe Fum-FA (Prenatal 19) tablet Take 1 tablet by mouth daily  (Patient not taking: No sig reported)     Allergies   Allergen Reactions   • Medical Tape Rash and Blisters     Immunization History   Administered Date(s) Administered   • DTaP 5 01/25/1991, 03/14/1991, 06/18/1991, 04/21/1992, 05/29/1996   • HPV Quadrivalent 11/12/2007, 08/29/2008, 12/29/2008   • Hep B, adult 11/02/2000, 12/07/2000, 03/04/2002   • IPV 01/25/1991, 03/14/1991, 04/21/1992, 05/29/1996   • Influenza, injectable, quadrivalent, preservative free 0 5 mL 11/19/2021   • MMR 04/21/1992, 05/29/1996   • Meningococcal, Unknown Serogroups 11/12/2007   • Td (adult), adsorbed 08/22/2007   • Tdap 06/12/2016, 03/10/2022       Objective     /80 (BP Location: Left arm, Patient Position: Sitting, Cuff Size: Large)   Pulse 84   Temp 98 5 °F (36 9 °C) (Temporal)   Resp 18   Ht 5' 6\" (1 676 m)   Wt 99 3 kg (219 lb)   SpO2 98%   BMI 35 35 kg/m²     Physical Exam  Vitals and nursing note reviewed  Constitutional:       General: She is not in acute distress  Appearance: She is well-developed  She is not diaphoretic  HENT:      Head: Normocephalic and atraumatic  Right Ear: External ear normal       Left Ear: External ear normal       Mouth/Throat:      Pharynx: Oropharynx is clear     Eyes:      Pupils: Pupils are equal, round, and " reactive to light  Cardiovascular:      Rate and Rhythm: Normal rate and regular rhythm  Pulmonary:      Effort: Pulmonary effort is normal       Breath sounds: Normal breath sounds  Abdominal:      Palpations: Abdomen is soft  Musculoskeletal:         General: Tenderness (Lumbar spine) present  Normal range of motion  Cervical back: Normal range of motion and neck supple  Skin:     General: Skin is dry  Neurological:      Mental Status: She is alert and oriented to person, place, and time  Psychiatric:         Behavior: Behavior normal          Thought Content:  Thought content normal        LUIS Mendes

## 2023-04-06 NOTE — TELEPHONE ENCOUNTER
Additional Information  • Negative: Has the patient had unexplained weight loss? • Negative: Does the patient have a fever? • Negative: Is the patient experiencing blood in sputum? • Negative: Is the patient experiencing urine retention? • Negative: Is the patient experiencing acute drop foot or paralysis? • Affirmative: Is this a chronic condition? • Negative: Has the patient experienced major trauma? (fall from height, high speed collision, direct blow to spine) and is also experiencing nausea, light-headedness, or loss of consciousness? Protocols used: SL AMB COMPREHENSIVE SPINE PROGRAM PROTOCOL    This RN did review in detail the Comprehensive Spine Program and what we can provide for their back pain  Patient is agreeable to being triaged by this RN and would like to proceed with Physical Therapy  Referral was placed for Physical Therapy at the [facility] site  Patients information was sent to the  to make evaluation appointment  Patient made aware that the PT office  will be calling to schedule the appointment  Patient was provided with the phone number to the PT office  No further questions and/or concerns were voiced by the patient at this time  Patient states understanding of the referral that was placed  Referral Closed

## 2023-04-06 NOTE — TELEPHONE ENCOUNTER
"Patient called and left v/m on 04/06/23 @  11:16am      Returned phone call on 04/06/23 @11:45am       Additional Information  • Negative: Is this related to a work injury? • Negative: Is this related to an MVA? • Negative: Are you currently recieving homecare services? Background - Initial Assessment  Clinical complaint: B/L lower back pain with sciatica  Hx of back pain for years but, it got worse after she had her son almost a year ago  Patient states the pain radiates to her buttocks, hips, and her entire R-leg   When she bends over she feels the radiation shooting up her upper back, neck and shoulders  Patient feels numbness on her R-leg  NKI  Saw a chiropractor before with no relief  Seen by PCP only  Had PT a year ago for sciatica and Xray from 11/2022 shows \"mild Scoliosis on L5-S1\"  Patient states the pain is constant and describes it as \"constant aching and then it turns into a sharp pain\"  Currently taking muscles relaxer with no relief  Date of onset: almost a year ago (new flare up)  Frequency of pain: constant  Quality of pain: aching, numb and sharp      Protocols used: SL AMB COMPREHENSIVE SPINE PROGRAM PROTOCOL      "

## 2023-04-18 ENCOUNTER — APPOINTMENT (OUTPATIENT)
Dept: PHYSICAL THERAPY | Age: 33
End: 2023-04-18

## 2023-04-27 ENCOUNTER — ANNUAL EXAM (OUTPATIENT)
Dept: OBGYN CLINIC | Facility: CLINIC | Age: 33
End: 2023-04-27

## 2023-04-27 VITALS
HEART RATE: 91 BPM | RESPIRATION RATE: 18 BRPM | BODY MASS INDEX: 35.2 KG/M2 | HEIGHT: 66 IN | SYSTOLIC BLOOD PRESSURE: 138 MMHG | DIASTOLIC BLOOD PRESSURE: 75 MMHG | WEIGHT: 219 LBS

## 2023-04-27 DIAGNOSIS — Z30.42 ENCOUNTER FOR SURVEILLANCE OF INJECTABLE CONTRACEPTIVE: ICD-10-CM

## 2023-04-27 DIAGNOSIS — Z20.2 POSSIBLE EXPOSURE TO STD: ICD-10-CM

## 2023-04-27 DIAGNOSIS — Z01.419 ENCOUNTER FOR GYNECOLOGICAL EXAMINATION WITHOUT ABNORMAL FINDING: Primary | ICD-10-CM

## 2023-04-27 PROBLEM — O13.9 GESTATIONAL HYPERTENSION: Status: RESOLVED | Noted: 2022-05-18 | Resolved: 2023-04-27

## 2023-04-27 PROBLEM — Z09 POSTOP CHECK: Status: RESOLVED | Noted: 2022-05-24 | Resolved: 2023-04-27

## 2023-04-27 RX ORDER — MEDROXYPROGESTERONE ACETATE 150 MG/ML
150 INJECTION, SUSPENSION INTRAMUSCULAR
Qty: 1 ML | Refills: 4 | Status: SHIPPED | OUTPATIENT
Start: 2023-04-27

## 2023-04-27 RX ADMIN — MEDROXYPROGESTERONE ACETATE 150 MG: 150 INJECTION, SUSPENSION INTRAMUSCULAR at 11:14

## 2023-04-27 NOTE — PROGRESS NOTES
Depo-Provera     • [x]   Patient provided box YES    o 0 Refills remain  o Refills submitted YES   • Last  Annual Date / Birth control check : 4/27/2023  • Last Depo date: 2/3/2023  • Side effects: NO   • HCG: NO   • Given by: Syl Mckeon  •   • Site: LEFT DELTOID     o Calcium supplement daily teaching  o Condoms for 2 weeks following first injection dose

## 2023-04-28 ENCOUNTER — OFFICE VISIT (OUTPATIENT)
Dept: PHYSICAL THERAPY | Age: 33
End: 2023-04-28

## 2023-04-28 DIAGNOSIS — G89.29 CHRONIC BILATERAL LOW BACK PAIN WITH RIGHT-SIDED SCIATICA: Primary | ICD-10-CM

## 2023-04-28 DIAGNOSIS — M54.41 CHRONIC BILATERAL LOW BACK PAIN WITH RIGHT-SIDED SCIATICA: Primary | ICD-10-CM

## 2023-04-28 LAB
C TRACH DNA SPEC QL NAA+PROBE: NEGATIVE
N GONORRHOEA DNA SPEC QL NAA+PROBE: NEGATIVE

## 2023-04-28 NOTE — PROGRESS NOTES
"Daily Note     Today's date: 2023  Patient name: Simona Barrow  : 1990  MRN: [id number]  Referring provider: Allyssa Moreno PT  Dx:   Encounter Diagnosis     ICD-10-CM    1  Chronic bilateral low back pain with right-sided sciatica  M54 41     G89 29                      Subjective: Reports feeling a little better until she fell up her steps last week  Objective: See treatment diary below      Assessment: Tolerated treatment well  Patient would benefit from continued PT, did well with tx with no complaints of pain throughout today's session  Was able to demonstrate HEP with no cueing needed  Plan: Continue per plan of care        Precautions: HTN      Manuals              CPA's T10-L2 CW                                                    Neuro Re-Ed             Bridge  20x5\"             Bridge w march 20x alt             rows             LPD 2x15 15#             Multifidi press             S/L hip abd  2x10 ea             Prone hip ext 2x10 ea             Ther Ex             TM to improve activity tolerance 8'             ADRIAN 2'             PPU 3x10                                                                              Ther Activity                                       Gait Training                                       Modalities                                            "

## 2023-05-01 ENCOUNTER — TELEPHONE (OUTPATIENT)
Dept: OBGYN CLINIC | Facility: CLINIC | Age: 33
End: 2023-05-01

## 2023-05-01 ENCOUNTER — OFFICE VISIT (OUTPATIENT)
Dept: PHYSICAL THERAPY | Age: 33
End: 2023-05-01

## 2023-05-01 DIAGNOSIS — M54.41 CHRONIC BILATERAL LOW BACK PAIN WITH RIGHT-SIDED SCIATICA: Primary | ICD-10-CM

## 2023-05-01 DIAGNOSIS — G89.29 CHRONIC BILATERAL LOW BACK PAIN WITH RIGHT-SIDED SCIATICA: Primary | ICD-10-CM

## 2023-05-01 NOTE — PROGRESS NOTES
"Daily Note     Today's date: 2023  Patient name: Michelle Cooper  : 1990  MRN: [id number]  Referring provider: Torrie Vivar, PT  Dx:   Encounter Diagnosis     ICD-10-CM    1  Chronic bilateral low back pain with right-sided sciatica  M54 41     G89 29                      Subjective: Reports feeling much better today, feels like therapy is helping her low back pain  Objective: See treatment diary below      Assessment: Tolerated treatment well  Patient would benefit from continued PT, continues to progress well through therapy with decreased complaints of pain  Plan: Continue per plan of care        Precautions: HTN      Manuals             CPA's T10-L2 CW                                                    Neuro Re-Ed             Bridge  20x5\"  np           Bridge w march 20x alt  25x alt            rows  2x20 20#           LPD 2x15 15#  2x20 15#           Multifidi press  25x ea 12#           S/L hip abd  2x10 ea  2x15 ea            Prone hip ext 2x10 ea  2x15 ea            Ther Ex             TM to improve activity tolerance 8'  10'            ADRIAN 2'  2'            PPU 3x10  3x10                                                                             Ther Activity                                       Gait Training                                       Modalities                                            "

## 2023-05-01 NOTE — TELEPHONE ENCOUNTER
----- Message from Linda Manzanares, [address] sent at 4/30/2023 10:23 PM EDT -----   Please inform pt that her culture for chlamydia and gonorrhea were negative  Thank You!

## 2023-05-01 NOTE — TELEPHONE ENCOUNTER
Called and spoke to patient, informed her of the [facility] results  Patient verbalized understanding, no questions

## 2023-05-09 ENCOUNTER — OFFICE VISIT (OUTPATIENT)
Dept: PHYSICAL THERAPY | Age: 33
End: 2023-05-09

## 2023-05-09 DIAGNOSIS — M54.41 CHRONIC BILATERAL LOW BACK PAIN WITH RIGHT-SIDED SCIATICA: Primary | ICD-10-CM

## 2023-05-09 DIAGNOSIS — G89.29 CHRONIC BILATERAL LOW BACK PAIN WITH RIGHT-SIDED SCIATICA: Primary | ICD-10-CM

## 2023-05-09 NOTE — PROGRESS NOTES
"Daily Note     Today's date: 2023  Patient name: Daniella Romero  : 1990  MRN: [id number]  Referring provider: Argenis Maria, PT  Dx:   Encounter Diagnosis     ICD-10-CM    1  Chronic bilateral low back pain with right-sided sciatica  M54 41     G89 29           Start Time: 1700  Stop Time: 1745  Total time in clinic (min): 45 minutes    Subjective: Pt reports increased pain since last session localized to lumbar spine  Pt reports history of hip issues causing hip weakness  Objective: See treatment diary below      Assessment: Tolerated treatment well  Pt re-assessed and finding very similar to initial evaluation  Open books added to increase mobility throughout lumbar and thoracic spine  Pt educated on importance  focusing on spinal stabilization in HEP  Patient demonstrated fatigue post treatment and would benefit from continued PT      Plan: Continue per plan of care        Precautions: HTN      Manuals           CPA's T10-L2 CW   JF                                                 Neuro Re-Ed             Bridge  20x5\"  np           Bridge w march 20x alt  25x alt            rows  2x20 20#           LPD 2x15 15#  2x20 15#           Multifidi press  25x ea 12#           S/L hip abd  2x10 ea  2x15 ea  2*15          Prone hip ext 2x10 ea  2x15 ea            Ther Ex             TM to improve activity tolerance 8'  10'  7'          ADRIAN 2'  2'            PPU 3x10  3x10  2*10          Open books   2*10*5\"                                                              Ther Activity                                       Gait Training                                       Modalities                                            "

## 2023-05-15 ENCOUNTER — OFFICE VISIT (OUTPATIENT)
Dept: PHYSICAL THERAPY | Age: 33
End: 2023-05-15

## 2023-05-15 DIAGNOSIS — M54.41 CHRONIC BILATERAL LOW BACK PAIN WITH RIGHT-SIDED SCIATICA: Primary | ICD-10-CM

## 2023-05-15 DIAGNOSIS — G89.29 CHRONIC BILATERAL LOW BACK PAIN WITH RIGHT-SIDED SCIATICA: Primary | ICD-10-CM

## 2023-05-15 NOTE — PROGRESS NOTES
"Daily Note     Today's date: 5/15/2023  Patient name: Corinna Alvarado  : 1990  MRN: [id number]  Referring provider: Elida Mera PT  Dx:   Encounter Diagnosis     ICD-10-CM    1  Chronic bilateral low back pain with right-sided sciatica  M54 41     G89 29                      Subjective: Reports feeling continued relief through physical therapy  Objective: See treatment diary below      Assessment: Tolerated treatment well  Patient would benefit from continued PT, was challenged by table exercises but was able to complete  Plan: Continue per plan of care        Precautions: HTN      Manuals 4/28  5/1  5/9 5/15         CPA's T10-L2 CW   JF CW                                                 Neuro Re-Ed             Bridge  20x5\"  np           Bridge w march 20x alt  25x alt   30x alt          rows  2x20 20#  2x20 22#         LPD 2x15 15#  2x20 15#  2x20 16#         Multifidi press  25x ea 12#  20x ea 13#         S/L hip abd  2x10 ea  2x15 ea  2*15 2x15 ea         Prone hip ext 2x10 ea  2x15 ea   2x15 ea          Ther Ex             TM to improve activity tolerance 8'  10'  7' 10'          ADRIAN 2'  2'            PPU 3x10  3x10  2*10 3x10          Open books   2*10*5\" 10x10\" ea                                                             Ther Activity                                       Gait Training                                       Modalities                                            "

## 2023-05-17 ENCOUNTER — HOSPITAL ENCOUNTER (EMERGENCY)
Facility: HOSPITAL | Age: 33
Discharge: HOME | End: 2023-05-17
Attending: EMERGENCY MEDICINE

## 2023-05-17 VITALS
SYSTOLIC BLOOD PRESSURE: 149 MMHG | TEMPERATURE: 97.7 F | HEART RATE: 86 BPM | DIASTOLIC BLOOD PRESSURE: 73 MMHG | OXYGEN SATURATION: 98 % | RESPIRATION RATE: 16 BRPM

## 2023-05-17 DIAGNOSIS — M54.50 LOW BACK PAIN: Primary | ICD-10-CM

## 2023-05-17 RX ORDER — LIDOCAINE 50 MG/G
2 PATCH TOPICAL ONCE
Status: DISCONTINUED | OUTPATIENT
Start: 2023-05-17 | End: 2023-05-17 | Stop reason: HOSPADM

## 2023-05-17 RX ORDER — ACETAMINOPHEN 325 MG/1
975 TABLET ORAL ONCE
Status: COMPLETED | OUTPATIENT
Start: 2023-05-17 | End: 2023-05-17

## 2023-05-17 RX ORDER — LIDOCAINE 50 MG/G
1 PATCH TOPICAL DAILY
Qty: 30 PATCH | Refills: 0 | Status: SHIPPED | OUTPATIENT
Start: 2023-05-17 | End: 2023-06-16

## 2023-05-17 RX ORDER — NAPROXEN 500 MG/1
500 TABLET ORAL 2 TIMES DAILY WITH MEALS
Qty: 30 TABLET | Refills: 0 | Status: SHIPPED | OUTPATIENT
Start: 2023-05-17

## 2023-05-17 RX ORDER — KETOROLAC TROMETHAMINE 30 MG/ML
15 INJECTION, SOLUTION INTRAMUSCULAR; INTRAVENOUS ONCE
Status: COMPLETED | OUTPATIENT
Start: 2023-05-17 | End: 2023-05-17

## 2023-05-17 RX ADMIN — LIDOCAINE 5% 2 PATCH: 700 PATCH TOPICAL at 14:05

## 2023-05-17 RX ADMIN — KETOROLAC TROMETHAMINE 15 MG: 30 INJECTION, SOLUTION INTRAMUSCULAR; INTRAVENOUS at 14:06

## 2023-05-17 RX ADMIN — ACETAMINOPHEN 975 MG: 325 TABLET ORAL at 14:05

## 2023-05-17 NOTE — Clinical Note
Elsi Fritz was seen and treated in our emergency department on 5/17/2023  Diagnosis:     Yolande Young  is off the rest of the shift today, may return to work on return date  She may return on this date: 05/22/2023         If you have any questions or concerns, please don't hesitate to call        Kate Funes MD    ______________________________           _______________          _______________  [facility] Representative                              Date                                Time

## 2023-05-17 NOTE — ED ATTENDING ATTESTATION
"5/17/2023  I, Ermias Arndt MD, saw and evaluated the patient  I have discussed the patient with the resident/non-physician practitioner and agree with the resident's/non-physician practitioner's findings, Plan of Care, and MDM as documented in the resident's/non-physician practitioner's note, except where noted  All available labs and Radiology studies were reviewed  I was present for key portions of any procedure(s) performed by the resident/non-physician practitioner and I was immediately available to provide assistance  At this point I agree with the current assessment done in the Emergency Department  I have conducted an independent evaluation of this patient a history and physical is as follows:    26-year-old female with history of right-sided low back pain, followed by comprehensive spine [place], presenting for evaluation of worsening right lower back pain  Patient was at work when she was adjusting her pant leg and felt a \"pop\" in her right lower back, accompanied by pain radiating into her right gluteus  Denies numbness or weakness in her legs, saddle anesthesia, or bowel or bladder incontinence  She is able to ambulate but with pain in the right lower back/right gluteal region with doing so  Denies midline back pain  Physical Exam  Vitals reviewed  Constitutional:       General: She is not in acute distress  Appearance: Normal appearance  She is not ill-appearing or toxic-appearing  HENT:      Head: Normocephalic and atraumatic  Right Ear: External ear normal       Left Ear: External ear normal       Nose: Nose normal    Eyes:      Conjunctiva/sclera: Conjunctivae normal    Cardiovascular:      Rate and Rhythm: Normal rate  Pulses: Normal pulses  Pulmonary:      Effort: Pulmonary effort is normal  No respiratory distress  Abdominal:      General: There is no distension  Musculoskeletal:         General: No deformity  Normal range of motion        Cervical back: " Normal range of motion  Comments: No midline TTP over the lumbar spine  TTP to the R lower perispinous muscles  Skin:     General: Skin is warm and dry  Neurological:      General: No focal deficit present  Mental Status: She is alert and oriented to person, place, and time  Comments: 5/5 strength to the proximal and distal bilateral LEs with intact sensation to light touch  No saddle anesthesia  Psychiatric:         Mood and Affect: Mood normal          ED Course  ED Course as of 05/17/23 1436   Wed May 17, 2023   1433 Patient has no red flag symptoms for cauda equina, conus medullaris, or spinal cord mass lesion  No midline tenderness to palpation over the lumbar spine  Pain is present to the right lower lumbar paraspinous muscles and radiates to the gluteal region, possible component of sciatica  Neurovascularly intact to the bilateral lower extremities  Will treat with dose of IM Toradol, lidocaine patches  Will provide prescription for naproxen after discussion of possible side effects  She already follows with the comprehensive spine program and was encouraged to continue follow-up with them  Recommend over-the-counter lidocaine patches  Patient is comfortable with this plan and will provide work note to allow for rest   Return precautions discussed           Critical Care Time  Procedures

## 2023-05-17 NOTE — ED PROVIDER NOTES
History  Chief Complaint   Patient presents with   • Back Pain     States was @ work today and bent over and while getting up felt lower back pop  States more R sided lower back pain  Able to walk but with pain  Denies any back surgeries     HPI 80-year-old female with history of lower back pain presents to the emergency department for acute onset right-sided lower back pain that happened earlier this morning after bending down  Patient says she was bending down and heard a sudden pop followed by significant right-sided lower back pain that radiates across the lower back and down into her glute  She went to the physical therapist at her job who tried to engage her in some stretching which helped a little but she continues to have significant pain, specifically with extension of the upper back  States she is able to walk independently, denies any numbness or tingling or weakness in her legs  Denies urinary or bowel incontinence  Denies prior back surgeries although she has been in PT for several weeks for her ongoing lower back pain, her most recent appointment was 2 days ago  Denies IV drug use  Took 2 Aleve prior to arrival     Prior to Admission Medications   Prescriptions Last Dose Informant Patient Reported? Taking?    FLUoxetine (PROzac) 40 MG capsule   Yes No   acetaminophen (TYLENOL) 325 mg tablet   No No   Sig: Take 2 tablets (650 mg total) by mouth every 6 (six) hours as needed for mild pain or moderate pain for up to 3 doses   cyclobenzaprine (FLEXERIL) 10 mg tablet   No No   Sig: Take 1 tablet (10 mg total) by mouth 3 (three) times a day as needed for muscle spasms   medroxyPROGESTERone (DEPO-PROVERA) 150 mg/mL injection   No No   Sig: Inject 1 mL (150 mg total) into a muscle every 3 (three) months      Facility-Administered Medications Last Administration Doses Remaining   medroxyPROGESTERone (DEPO-PROVERA) IM injection 150 mg 4/27/2023 11:14 AM           Past Medical History:   Diagnosis Date   • 37 weeks gestation of pregnancy 2022   • Breech presentation 2022    Desires primary , declines EV   • Depression    • GBS (group B Streptococcus carrier), +RV culture, currently pregnant 2022   • Gestational hypertension 2022   • Gestational hypertension 2022   • Kidney stone    • Seasonal allergies    • Tobacco smoking affecting pregnancy in third trimester 2022       Past Surgical History:   Procedure Laterality Date   • IR IMAGE GUIDED ASPIRATION / DRAINAGE  3/22/2019   • MT BREAST REDUCTION Bilateral 3/12/2019    Procedure: REDUCTION MAMMOPLASTY BREAST;  Surgeon: Demi Srivastava MD;  Location: [facility];  Service: Plastics   • MT  DELIVERY ONLY N/A 2022    Procedure:  SECTION (); Surgeon: Harjeet Vargas DO;  Location: [facility];  Service: Obstetrics   • THROAT SURGERY      as a child to remove abcess; cyst removal   • WISDOM TOOTH EXTRACTION      3/4       Family History   Problem Relation Age of Onset   • Hypertension Mother    • No Known Problems Father    • Depression Sister    • Polycystic ovary syndrome Sister    • No Known Problems Daughter    • Cervical cancer Maternal Grandmother    • Diabetes Maternal Grandmother    • Kidney disease Maternal Grandmother    • Cancer Maternal Grandmother    • Depression Maternal Grandfather    • Breast cancer Neg Hx    • Colon cancer Neg Hx    • Ovarian cancer Neg Hx      I have reviewed and agree with the history as documented      E-Cigarette/Vaping   • E-Cigarette Use Never User      E-Cigarette/Vaping Substances   • Nicotine No    • THC No    • CBD No    • Flavoring No    • Other No    • Unknown No      Social History     Tobacco Use   • Smoking status: Every Day     Packs/day: 0 25     Years: 11 00     Pack years: 2 75     Types: Cigarettes   • Smokeless tobacco: Never   • Tobacco comments:      smoked since age 15- per [place]   Vaping Use   • Vaping Use: Never used   Substance Use Topics • Alcohol use: Not Currently     Comment: socially   • Drug use: No        Review of Systems   Constitutional: Negative for chills and fever  HENT: Negative for ear pain and sore throat  Eyes: Negative for visual disturbance  Respiratory: Negative for cough and shortness of breath  Cardiovascular: Negative for chest pain and leg swelling  Gastrointestinal: Negative for abdominal pain, blood in stool, constipation, diarrhea, nausea and vomiting  Genitourinary: Negative for dysuria and hematuria  Musculoskeletal: Positive for back pain  Negative for neck pain and neck stiffness  Neurological: Negative for dizziness, syncope, weakness and light-headedness  All other systems reviewed and are negative  Physical Exam  ED Triage Vitals [05/17/23 1228]   Temperature Pulse Respirations Blood Pressure SpO2   97 7 °F (36 5 °C) 86 16 149/73 98 %      Temp Source Heart Rate Source Patient Position - Orthostatic VS BP Location FiO2 (%)   Oral Monitor Sitting Right arm --      Pain Score       7             Orthostatic Vital Signs  Vitals:    05/17/23 1228   BP: 149/73   Pulse: 86   Patient Position - Orthostatic VS: Sitting       Physical Exam  Vitals and nursing note reviewed  Constitutional:       General: She is not in acute distress  Appearance: She is well-developed  Comments: In pain, looks uncomfortable   HENT:      Head: Normocephalic and atraumatic  Eyes:      Conjunctiva/sclera: Conjunctivae normal    Cardiovascular:      Rate and Rhythm: Normal rate and regular rhythm  Heart sounds: Normal heart sounds  No murmur heard  Pulmonary:      Effort: Pulmonary effort is normal  No respiratory distress  Breath sounds: Normal breath sounds  Abdominal:      Palpations: Abdomen is soft  Tenderness: There is no abdominal tenderness  Musculoskeletal:         General: Tenderness (right lower back, paraspinal lumbar tenderness) present  No swelling        Cervical back: Neck supple  Lumbar back: No deformity, signs of trauma, spasms or bony tenderness  Positive right straight leg raise test and positive left straight leg raise test       Right lower leg: No edema  Left lower leg: No edema  Skin:     General: Skin is warm and dry  Capillary Refill: Capillary refill takes less than 2 seconds  Neurological:      General: No focal deficit present  Mental Status: She is alert and oriented to person, place, and time  GCS: GCS eye subscore is 4  GCS verbal subscore is 5  GCS motor subscore is 6  Cranial Nerves: No dysarthria or facial asymmetry  Sensory: Sensation is intact  Motor: Motor function is intact  No weakness  Gait: Gait is intact  Gait normal       Comments: 5/5 BLE strength, no saddle anesthesia    Psychiatric:         Mood and Affect: Mood normal          ED Medications  Medications   acetaminophen (TYLENOL) tablet 975 mg (975 mg Oral Given 5/17/23 1405)   ketorolac (TORADOL) injection 15 mg (15 mg Intravenous Given 5/17/23 1406)       Diagnostic Studies  Results Reviewed     None                 No orders to display         Procedures  Procedures      ED Course                                       Medical Decision Making  72-year-old female with history of lower back pain presents to the emergency department for acute onset right-sided lower back pain that happened earlier this morning after bending down  Differential diagnosis includes but is not limited to: Musculoskeletal strain, ligamentous injury, sciatica; lower suspicion for cauda equina or epidural abscess    Here in the department, patient is hemodynamically stable, afebrile, nontachycardic with normal work of breathing satting 98% on room air  In the room she is alert and oriented, speaking in full sentences, unable to get comfortable, in pain but not in acute distress    She is tender in the paraspinal muscles on the right lower back, no overlying skin changes or rashes  No midline bony tenderness  No saddle anesthesia  Sensation intact to bilateral lower extremities, 5 out of 5 lower extremity strength, strong distal dorsal pedis pulses, skin is warm and pink bilaterally  I get patient up to ambulate, she is able to walk independently, no foot drop  Given lack of red flag symptoms or findings on exam, do not feel that imaging is necessary at this point  Patient treated with Tylenol, Toradol, lidocaine patch  She [facility] and engaged with physical therapy services  Discussion of strict return precautions  Patient discharged home in stable condition  She walks independently out of the department  Risk  OTC drugs  Prescription drug management  Disposition  Final diagnoses:   Low back pain     Time reflects when diagnosis was documented in both MDM as applicable and the Disposition within this note     Time User Action Codes Description Comment    5/17/2023  1:38 PM Leonidas Zamora [M54 50] Low back pain       ED Disposition     ED Disposition   Discharge    Condition   Stable    Date/Time   Wed May 17, 2023  2:28 PM    Comment   Dario Cordero discharge to home/self care                 Follow-up Information     Follow up With Specialties Details Why Contact Info    [facility]  Schedule an appointment as soon as possible for a visit             Discharge Medication List as of 5/17/2023  2:31 PM      START taking these medications    Details   lidocaine (Lidoderm) 5 % Apply 1 patch topically over 12 hours daily Remove & Discard patch within 12 hours or as directed by MD, Starting Wed 5/17/2023, Until Fri 6/16/2023, Normal      naproxen (Naprosyn) 500 mg tablet Take 1 tablet (500 mg total) by mouth 2 (two) times a day with meals, Starting Wed 5/17/2023, Normal         CONTINUE these medications which have NOT CHANGED    Details   acetaminophen (TYLENOL) 325 mg tablet Take 2 tablets (650 mg total) by mouth every 6 (six) hours as needed for mild pain or moderate pain for up to 3 doses, Starting Fri 5/20/2022, No Print      cyclobenzaprine (FLEXERIL) 10 mg tablet Take 1 tablet (10 mg total) by mouth 3 (three) times a day as needed for muscle spasms, Starting Thu 4/6/2023, Normal      FLUoxetine (PROzac) 40 MG capsule Starting Thu 3/30/2023, Historical Med      medroxyPROGESTERone (DEPO-PROVERA) 150 mg/mL injection Inject 1 mL (150 mg total) into a muscle every 3 (three) months, Starting Thu 4/27/2023, Normal           No discharge procedures on file  PDMP Review       Value Time User    PDMP Reviewed  Yes 5/20/2022  8:50 AM Heladio Manjarrez MD           ED Provider  Attending physically available and evaluated Shelly Owen I managed the patient along with the ED Attending      Electronically Signed by         Radha Graf MD  05/19/23 3296 Daily Fractionated Dose (Optional- Include Units): 266.76 cGy

## 2023-05-17 NOTE — DISCHARGE INSTRUCTIONS
Send you home with 2 prescriptions, one is for naproxen Verlon Houser), which you can take twice daily as needed for pain  PLEASE TAKE WITH FOOD  The other is for lidocaine patches which you can apply daily as needed  Recommend you continue follow-up with the [facility] and PT  Please come back to the emergency department if you have difficulty urinating, loss of bladder or bowels, fever, numbness or weakness in the legs

## 2023-05-18 ENCOUNTER — TELEPHONE (OUTPATIENT)
Dept: FAMILY MEDICINE CLINIC | Facility: CLINIC | Age: 33
End: 2023-05-18

## 2023-05-18 ENCOUNTER — APPOINTMENT (OUTPATIENT)
Dept: URGENT CARE | Facility: CLINIC | Age: 33
End: 2023-05-18

## 2023-05-18 NOTE — TELEPHONE ENCOUNTER
Patient called she reported that she has been doing well with Physical Therapy  She hurt her back and was in ER  She reports that she spoke with Physical Therapy and they suggested that they contact her PCP to maybe order a MRI  Please review and advise  I did make patient aware that she made need an office appointment  Patient understood

## 2023-05-19 DIAGNOSIS — G89.29 CHRONIC LOW BACK PAIN WITH SCIATICA, SCIATICA LATERALITY UNSPECIFIED, UNSPECIFIED BACK PAIN LATERALITY: Primary | ICD-10-CM

## 2023-05-19 DIAGNOSIS — M54.40 CHRONIC LOW BACK PAIN WITH SCIATICA, SCIATICA LATERALITY UNSPECIFIED, UNSPECIFIED BACK PAIN LATERALITY: Primary | ICD-10-CM

## 2023-05-19 NOTE — TELEPHONE ENCOUNTER
The MRI order for the lumbar spine is in her chart I am recommending and highly suggesting that she contact her insurance company to find out if this needs any prior testing prior to that she had had an x-ray of the lumbar spine recently

## 2023-06-01 ENCOUNTER — HOSPITAL ENCOUNTER (OUTPATIENT)
Dept: RADIOLOGY | Facility: HOSPITAL | Age: 33
Discharge: HOME | End: 2023-06-01

## 2023-06-01 DIAGNOSIS — G89.29 CHRONIC LOW BACK PAIN WITH SCIATICA, SCIATICA LATERALITY UNSPECIFIED, UNSPECIFIED BACK PAIN LATERALITY: ICD-10-CM

## 2023-06-01 DIAGNOSIS — M54.40 CHRONIC LOW BACK PAIN WITH SCIATICA, SCIATICA LATERALITY UNSPECIFIED, UNSPECIFIED BACK PAIN LATERALITY: ICD-10-CM

## 2023-06-05 ENCOUNTER — APPOINTMENT (OUTPATIENT)
Dept: PHYSICAL THERAPY | Age: 33
End: 2023-06-05
Payer: COMMERCIAL

## 2023-06-07 ENCOUNTER — TELEPHONE (OUTPATIENT)
Dept: FAMILY MEDICINE CLINIC | Facility: CLINIC | Age: 33
End: 2023-06-07

## 2023-06-07 NOTE — TELEPHONE ENCOUNTER
Miladis James called reporting that her MRI results are complete and would like you to read them  She suspended her physical therapy until you result them      Please review and advise   Thanks

## 2023-06-07 NOTE — TELEPHONE ENCOUNTER
She does have Lower lumbar disc narrowing most notably at L4-L5 and L5-S1  If her physical therapy is not giving her the relief she has a referral for the spine center and I would proceed with that but most likely they will reorder the physical therapy but we can give it a shot

## 2023-06-12 ENCOUNTER — APPOINTMENT (OUTPATIENT)
Dept: PHYSICAL THERAPY | Age: 33
End: 2023-06-12
Payer: COMMERCIAL

## 2023-06-15 ENCOUNTER — EVALUATION (OUTPATIENT)
Dept: PHYSICAL THERAPY | Age: 33
End: 2023-06-15
Payer: COMMERCIAL

## 2023-06-15 DIAGNOSIS — G89.29 CHRONIC LOW BACK PAIN, UNSPECIFIED BACK PAIN LATERALITY, UNSPECIFIED WHETHER SCIATICA PRESENT: Primary | ICD-10-CM

## 2023-06-15 DIAGNOSIS — M54.50 CHRONIC LOW BACK PAIN, UNSPECIFIED BACK PAIN LATERALITY, UNSPECIFIED WHETHER SCIATICA PRESENT: Primary | ICD-10-CM

## 2023-06-15 PROCEDURE — 97112 NEUROMUSCULAR REEDUCATION: CPT | Performed by: PHYSICAL THERAPIST

## 2023-06-15 PROCEDURE — 97140 MANUAL THERAPY 1/> REGIONS: CPT | Performed by: PHYSICAL THERAPIST

## 2023-06-15 PROCEDURE — 97110 THERAPEUTIC EXERCISES: CPT | Performed by: PHYSICAL THERAPIST

## 2023-06-15 NOTE — PROGRESS NOTES
"Daily Note     Today's date: 6/15/2023  Patient name: Wes Calix  : 1990  MRN: [id number]  Referring provider: Komal Case, PT  Dx:   Encounter Diagnosis     ICD-10-CM    1  Chronic low back pain, unspecified back pain laterality, unspecified whether sciatica present  M54 50     G89 29                      Subjective: Reported feeling good about a month ago when finishing therapy but then was putting on her work outfit and aggravated her back  She had an MRI completed which demonstrated spondylosis @ L4-5 ad L5-S1 with some foraminal narrowing  Objective: See treatment diary below      Assessment: Tolerated treatment well  Patient would benefit from continued PT, patient would like to continue 1x/week and will reassess if she would benefit from referral to spine and pain for further consultation  Plan: Continue per plan of care        Precautions: HTN      Manuals 4/28  5/1  5/9 5/15 6/15         CPA's T10-L2 CW   JF CW                                                 Neuro Re-Ed             Bridge  20x5\"  np           Bridge w march 20x alt  25x alt   30x alt  3x10x5\"         rows  2x20 20#  2x20 22# 2x20 20#         LPD 2x15 15#  2x20 15#  2x20 16# 2x20 14#        Multifidi press  25x ea 12#  20x ea 13# 20x ea 13#        S/L hip abd  2x10 ea  2x15 ea  2*15 2x15 ea 2x15 ea         Prone hip ext 2x10 ea  2x15 ea   2x15 ea  2x15 ea         Ther Ex             TM to improve activity tolerance 8'  10'  7' 10'  8'         ADRIAN 2'  2'            PPU 3x10  3x10  2*10 3x10  3x10         Open books   2*10*5\" 10x10\" ea 10x10\" ea                                                            Ther Activity                                       Gait Training                                       Modalities                                            "

## 2023-06-20 ENCOUNTER — APPOINTMENT (OUTPATIENT)
Dept: PHYSICAL THERAPY | Age: 33
End: 2023-06-20
Payer: COMMERCIAL

## 2023-06-27 ENCOUNTER — OFFICE VISIT (OUTPATIENT)
Dept: PHYSICAL THERAPY | Age: 33
End: 2023-06-27
Payer: COMMERCIAL

## 2023-06-27 DIAGNOSIS — M54.50 CHRONIC LOW BACK PAIN, UNSPECIFIED BACK PAIN LATERALITY, UNSPECIFIED WHETHER SCIATICA PRESENT: Primary | ICD-10-CM

## 2023-06-27 DIAGNOSIS — G89.29 CHRONIC LOW BACK PAIN, UNSPECIFIED BACK PAIN LATERALITY, UNSPECIFIED WHETHER SCIATICA PRESENT: Primary | ICD-10-CM

## 2023-06-27 PROCEDURE — 97112 NEUROMUSCULAR REEDUCATION: CPT | Performed by: PHYSICAL THERAPIST

## 2023-06-27 PROCEDURE — 97110 THERAPEUTIC EXERCISES: CPT | Performed by: PHYSICAL THERAPIST

## 2023-06-27 PROCEDURE — 97140 MANUAL THERAPY 1/> REGIONS: CPT | Performed by: PHYSICAL THERAPIST

## 2023-06-27 NOTE — PROGRESS NOTES
"Daily Note     Today's date: 2023  Patient name: Say Ramos  : 1990  MRN: [id number]  Referring provider: Neville Cartagena PT  Dx:   Encounter Diagnosis     ICD-10-CM    1  Chronic low back pain, unspecified back pain laterality, unspecified whether sciatica present  M54 50     G89 29                      Subjective: Reports feeling some increased soreness coming in today  Objective: See treatment diary below      Assessment: Tolerated treatment well  Patient would benefit from continued PT, did well with tx with no complaints of pain throughout, can continue to progress going forward  Plan: Continue per plan of care        Precautions: HTN      Manuals 4/28  5/1  5/9 5/15 6/15  6/27        CPA's T10-L2 CW   JF CW                                                 Neuro Re-Ed             Bridge  20x5\"  np           Bridge w march 20x alt  25x alt   30x alt  3x10x5\"  25x ea        S/L bridge       2x10 ea        rows  2x20 20#  2x20 22# 2x20 20#  2x15 22#        LPD 2x15 15#  2x20 15#  2x20 16# 2x20 14# 2x15 16#       Multifidi press  25x ea 12#  20x ea 13# 20x ea 13# 20x ea 14#       S/L hip abd  2x10 ea  2x15 ea  2*15 2x15 ea 2x15 ea  2x10 ea 2#       Prone hip ext 2x10 ea  2x15 ea   2x15 ea  2x15 ea  2x10 ea 2#        Ther Ex             TM to improve activity tolerance 8'  10'  7' 10'  8'  8'        ADRIAN 2'  2'            PPU 3x10  3x10  2*10 3x10  3x10  3x10        Open books   2*10*5\" 10x10\" ea 10x10\" ea 10x10\" ea                                                           Ther Activity                                       Gait Training                                       Modalities                                            "

## 2023-07-06 ENCOUNTER — APPOINTMENT (OUTPATIENT)
Dept: PHYSICAL THERAPY | Age: 33
End: 2023-07-06
Payer: COMMERCIAL

## 2023-07-11 ENCOUNTER — OFFICE VISIT (OUTPATIENT)
Dept: PHYSICAL THERAPY | Age: 33
End: 2023-07-11
Payer: COMMERCIAL

## 2023-07-11 DIAGNOSIS — M54.50 CHRONIC LOW BACK PAIN, UNSPECIFIED BACK PAIN LATERALITY, UNSPECIFIED WHETHER SCIATICA PRESENT: Primary | ICD-10-CM

## 2023-07-11 DIAGNOSIS — G89.29 CHRONIC LOW BACK PAIN, UNSPECIFIED BACK PAIN LATERALITY, UNSPECIFIED WHETHER SCIATICA PRESENT: Primary | ICD-10-CM

## 2023-07-11 PROCEDURE — 97140 MANUAL THERAPY 1/> REGIONS: CPT | Performed by: PHYSICAL THERAPIST

## 2023-07-11 PROCEDURE — 97112 NEUROMUSCULAR REEDUCATION: CPT | Performed by: PHYSICAL THERAPIST

## 2023-07-11 PROCEDURE — 97110 THERAPEUTIC EXERCISES: CPT | Performed by: PHYSICAL THERAPIST

## 2023-07-11 NOTE — PROGRESS NOTES
Daily Note     Today's date: 2023  Patient name: Zuleyka Terry  : 1990  MRN: [id number]  Referring provider: Roxanna Calzada PT  Dx:   Encounter Diagnosis     ICD-10-CM    1. Chronic low back pain, unspecified back pain laterality, unspecified whether sciatica present  M54.50     G89.29                      Subjective: Reports feeling continued improvement with less complaints of shooting pain distally. Objective: See treatment diary below      Assessment: Tolerated treatment well. Patient would benefit from continued PT, focused on progressing DLS exercises with minimal complaints of pain throughout today's session. Plan: Continue per plan of care.       Precautions: HTN      Manuals 4/28  5/1  5/9 5/15 6/15  6/27  7/11       CPA's T10-L2 CW   JF CW  CW CW CW                                             Neuro Re-Ed             Bridge  20x5"  np           Bridge w march 20x alt  25x alt   30x alt  3x10x5"  25x ea        S/L bridge       2x10 ea  2x15x5" ea      rows  2x20 20#  2x20 22# 2x20 20#  2x15 22#  2x15 24#       LPD 2x15 15#  2x20 15#  2x20 16# 2x20 14# 2x15 16# 2x15 18#      Multifidi press  25x ea 12#  20x ea 13# 20x ea 13# 20x ea 14# 2x15 ea 15#      [place] mini rotations       2x10 ea 12#      S/L hip abd  2x10 ea  2x15 ea  2*15 2x15 ea 2x15 ea  2x10 ea 2#       Prone hip ext 2x10 ea  2x15 ea   2x15 ea  2x15 ea  2x10 ea 2#        Dead bugs              Ther Ex             TM to improve activity tolerance 8'  10'  7' 10'  8'  8'  10'       ADRIAN 2'  2'            PPU 3x10  3x10  2*10 3x10  3x10  3x10  3x10       Open books   2*10*5" 10x10" ea 10x10" ea 10x10" ea 10x10" ea                                                           Ther Activity                                       Gait Training                                       Modalities

## 2023-07-13 NOTE — ASSESSMENT & PLAN NOTE
Problem: Infection  Goal: Absence of Infection Signs and Symptoms  Outcome: Ongoing, Progressing      - She reduce 5 cig/day,  Encourage to quit smoking

## 2023-07-20 ENCOUNTER — CLINICAL SUPPORT (OUTPATIENT)
Dept: OBGYN CLINIC | Facility: CLINIC | Age: 33
End: 2023-07-20

## 2023-07-20 ENCOUNTER — OFFICE VISIT (OUTPATIENT)
Dept: PHYSICAL THERAPY | Age: 33
End: 2023-07-20
Payer: COMMERCIAL

## 2023-07-20 DIAGNOSIS — G89.29 CHRONIC LOW BACK PAIN, UNSPECIFIED BACK PAIN LATERALITY, UNSPECIFIED WHETHER SCIATICA PRESENT: Primary | ICD-10-CM

## 2023-07-20 DIAGNOSIS — Z30.42 ENCOUNTER FOR SURVEILLANCE OF INJECTABLE CONTRACEPTIVE: Primary | ICD-10-CM

## 2023-07-20 DIAGNOSIS — M54.50 CHRONIC LOW BACK PAIN, UNSPECIFIED BACK PAIN LATERALITY, UNSPECIFIED WHETHER SCIATICA PRESENT: Primary | ICD-10-CM

## 2023-07-20 PROCEDURE — 97140 MANUAL THERAPY 1/> REGIONS: CPT | Performed by: PHYSICAL THERAPIST

## 2023-07-20 PROCEDURE — 97110 THERAPEUTIC EXERCISES: CPT | Performed by: PHYSICAL THERAPIST

## 2023-07-20 PROCEDURE — 97112 NEUROMUSCULAR REEDUCATION: CPT | Performed by: PHYSICAL THERAPIST

## 2023-07-20 RX ADMIN — MEDROXYPROGESTERONE ACETATE 150 MG: 150 INJECTION, SUSPENSION INTRAMUSCULAR at 10:02

## 2023-07-20 NOTE — PROGRESS NOTES
Daily Note     Today's date: 2023  Patient name: Bola Longoria  : 1990  MRN: [id number]  Referring provider: Bhavik Su PT  Dx:   Encounter Diagnosis     ICD-10-CM    1. Chronic low back pain, unspecified back pain laterality, unspecified whether sciatica present  M54.50     G89.29                      Assessment  Assessment details: Patient presents with decreased complaints of pain coming in today. She demonstrates improved thoracolumbar range of motion and strength. She will be D/C to HEP at this time. Prognosis: good    Goals  ST- demonstrate compliancy with HEP in 1 week. Met   Decrease reported pain to 5/10 at worst and with activity, to improve functional capacity, within 3 weeks partially met   Improve thoracolumbar range of motion to normal, within 3 weeks partially met     LT- Improve FOTO score to specified value to improve patients perceived benefit of therapy, in 8 weeks met  Be able to play with her kid with no complaints of pain, to improve QoL, within 10 weeks partially met     Plan  D/C to HEP focusing on thoracolumbar range of motion and stabilization.      Subjective Evaluation    History of Present Illness  Mechanism of injury: Chronic onset, child birth   Pain  Current pain ratin  At best pain ratin  At worst pain ratin  Location: central low back   Quality: dull ache, sharp and burning  Relieving factors: relaxation and rest  Aggravating factors: lifting, standing and sitting  Progression: worsening      Diagnostic Tests  X-ray: normal  Treatments  Previous treatment: chiropractic  Current treatment: medication  Patient Goals  Patient goals for therapy: decreased pain and independence with ADLs/IADLs  Patient goal: be able to play with her kid with no complaints of pain         Objective     General Comments:      Lumbar Comments  rom  Thoracolumbar flexion, B/L rotation and B/L side bend all minimally limited     mmt  Hip flexion L=4+ R=4+  Hip abduction L=4+ R=4+  Hip adduction L=4+ R=4+  Knee extension L=4+ R=4+  Knee flexion L=4+ R=4+         Precautions: HTN      Manuals 4/28  5/1  5/9 5/15 6/15  6/27  7/11  7/20     CPA's T10-L2 CW   JF CW  CW CW CW CW                                            Neuro Re-Ed             Bridge  20x5"  np           Bridge w march 20x alt  25x alt   30x alt  3x10x5"  25x ea        S/L bridge       2x10 ea  2x15x5" ea 2x15x5"      rows  2x20 20#  2x20 22# 2x20 20#  2x15 22#  2x15 24#  2x15 BTB      LPD 2x15 15#  2x20 15#  2x20 16# 2x20 14# 2x15 16# 2x15 18# 2x15 BTB     Multifidi press  25x ea 12#  20x ea 13# 20x ea 13# 20x ea 14# 2x15 ea 15#      [place] mini rotations       2x10 ea 12#      S/L hip abd  2x10 ea  2x15 ea  2*15 2x15 ea 2x15 ea  2x10 ea 2#       Prone hip ext 2x10 ea  2x15 ea   2x15 ea  2x15 ea  2x10 ea 2#        Dead bugs         2x10      Ther Ex             TM to improve activity tolerance 8'  10'  7' 10'  8'  8'  10'  8'      ADRIAN 2'  2'            PPU 3x10  3x10  2*10 3x10  3x10  3x10  3x10       Open books   2*10*5" 10x10" ea 10x10" ea 10x10" ea 10x10" ea  5x10" ea                                                          Ther Activity                                       Gait Training                                       Modalities

## 2023-07-20 NOTE — PROGRESS NOTES
Depo-Provera     • [x]   Patient provided box yes   o 4 Refills remain  o Refills submitted no  • Last  Annual Date / Birth control check : 04/27/2023  • Last Depo date: 04/27/2023  • Side effects: no  • HCG: no  o if applicable: negative  • Given by: ev  • Site: left deltoid    o Calcium supplement daily teaching  o Condoms for 2 weeks following first injection dose.

## 2023-10-10 ENCOUNTER — CLINICAL SUPPORT (OUTPATIENT)
Dept: OBGYN CLINIC | Facility: CLINIC | Age: 33
End: 2023-10-10

## 2023-10-10 DIAGNOSIS — Z30.42 ENCOUNTER FOR SURVEILLANCE OF INJECTABLE CONTRACEPTIVE: ICD-10-CM

## 2023-10-10 PROCEDURE — 96372 THER/PROPH/DIAG INJ SC/IM: CPT

## 2023-10-10 RX ADMIN — MEDROXYPROGESTERONE ACETATE 150 MG: 150 INJECTION, SUSPENSION INTRAMUSCULAR at 15:31

## 2023-10-10 NOTE — PROGRESS NOTES
Depo-Provera     • [x]   Patient provided box yes   o 3 Refills remain  o Refills submitted no  • Last  Annual Date / Birth control check : 04/27/2023  • Last Depo date: 07/20/2023  • Side effects: no  • HCG: no  o if applicable: negative  • Given by: ev  • Site: left deltoid    o Calcium supplement daily teaching  o Condoms for 2 weeks following first injection dose.

## 2024-01-08 ENCOUNTER — CLINICAL SUPPORT (OUTPATIENT)
Dept: OBGYN CLINIC | Facility: CLINIC | Age: 34
End: 2024-01-08

## 2024-01-08 DIAGNOSIS — Z30.42 ENCOUNTER FOR MANAGEMENT AND INJECTION OF DEPO-PROVERA: Primary | ICD-10-CM

## 2024-01-08 PROCEDURE — 96372 THER/PROPH/DIAG INJ SC/IM: CPT

## 2024-01-08 RX ADMIN — MEDROXYPROGESTERONE ACETATE 150 MG: 150 INJECTION, SUSPENSION INTRAMUSCULAR at 15:12

## 2024-01-08 NOTE — PROGRESS NOTES
Depo-Provera     [x]   Patient provided box yes   1 Refills remain  Refills submitted no  Last  Annual Date / Birth control check : 4/27/2023  Last Depo date: 10/10/2023  Side effects: no  HCG: no  if applicable: N/A  Given by: Lorena Barrientos  Site: right upper outer quadrant     Calcium supplement daily teaching  Condoms for 2 weeks following first injection dose.

## 2024-02-21 PROBLEM — Z01.419 ENCOUNTER FOR GYNECOLOGICAL EXAMINATION WITHOUT ABNORMAL FINDING: Status: RESOLVED | Noted: 2023-04-27 | Resolved: 2024-02-21

## 2024-04-05 ENCOUNTER — CLINICAL SUPPORT (OUTPATIENT)
Dept: OBGYN CLINIC | Facility: CLINIC | Age: 34
End: 2024-04-05

## 2024-04-05 DIAGNOSIS — Z30.42 ENCOUNTER FOR MANAGEMENT AND INJECTION OF DEPO-PROVERA: Primary | ICD-10-CM

## 2024-04-05 PROCEDURE — 96372 THER/PROPH/DIAG INJ SC/IM: CPT

## 2024-04-05 RX ADMIN — MEDROXYPROGESTERONE ACETATE 150 MG: 150 INJECTION, SUSPENSION INTRAMUSCULAR at 11:15

## 2024-04-05 NOTE — PROGRESS NOTES
Depo-Provera     [x]   Patient provided box YES   0 Refills remain  Refills submitted no  Last  Annual Date / Birth control check : 04/27/2024  Last Depo date: 01/08/2024  Side effects: no  HCG: no  if applicable:   Given by: Salina Yu  Site: RUOQ    Calcium supplement daily teaching  Condoms for 2 weeks following first injection dose.

## 2024-04-25 ENCOUNTER — OFFICE VISIT (OUTPATIENT)
Dept: FAMILY MEDICINE CLINIC | Facility: CLINIC | Age: 34
End: 2024-04-25
Payer: COMMERCIAL

## 2024-04-25 VITALS
BODY MASS INDEX: 34.87 KG/M2 | WEIGHT: 217 LBS | OXYGEN SATURATION: 97 % | HEIGHT: 66 IN | TEMPERATURE: 98.2 F | DIASTOLIC BLOOD PRESSURE: 78 MMHG | RESPIRATION RATE: 16 BRPM | HEART RATE: 76 BPM | SYSTOLIC BLOOD PRESSURE: 126 MMHG

## 2024-04-25 DIAGNOSIS — Z00.00 ROUTINE ADULT HEALTH MAINTENANCE: Primary | ICD-10-CM

## 2024-04-25 PROCEDURE — 99395 PREV VISIT EST AGE 18-39: CPT | Performed by: NURSE PRACTITIONER

## 2024-04-25 RX ORDER — MEDROXYPROGESTERONE ACETATE 150 MG/ML
INJECTION, SUSPENSION INTRAMUSCULAR
COMMUNITY
Start: 2024-04-03

## 2024-04-25 NOTE — PROGRESS NOTES
Assessment/Plan:      Diagnoses and all orders for this visit:    Routine adult health maintenance  -     CBC and differential; Future  -     Comprehensive metabolic panel; Future  -     Lipid panel; Future  -     TSH, 3rd generation; Future  -     UA w Reflex to Microscopic w Reflex to Culture; Future    Physical assessment unremarkable. VS were well controlled. Labs given is to complete for possible fu discussion. RTO as needed or for next scheduled appt. All questions answered.          Subjective:     Patient ID: Jaclyn Cordero is a 33 y.o. female.      Patient is here for routine follow-up.  To review most recent labs.  To also discuss current state of health and any new problems that they may be experiencing.  Patient states that medications taken as prescribed and very well tolerated no new complaints at this time.            Review of Systems   Constitutional:  Negative for appetite change and fever.   HENT:  Negative for sinus pressure and sore throat.    Eyes:  Negative for pain.   Respiratory:  Negative for shortness of breath.    Cardiovascular:  Negative for chest pain.   Gastrointestinal:  Negative for abdominal pain.   Genitourinary:  Negative for dysuria.   Musculoskeletal:  Negative for arthralgias and myalgias.   Skin:  Negative for color change.   Neurological:  Negative for light-headedness.   Psychiatric/Behavioral:  Negative for behavioral problems.          Objective:     Physical Exam  Vitals and nursing note reviewed.   Constitutional:       General: She is not in acute distress.     Appearance: She is well-developed. She is obese. She is not diaphoretic.   HENT:      Head: Normocephalic and atraumatic.      Right Ear: External ear normal.      Left Ear: External ear normal.      Mouth/Throat:      Pharynx: Oropharynx is clear.   Eyes:      Pupils: Pupils are equal, round, and reactive to light.   Cardiovascular:      Rate and Rhythm: Normal rate and regular rhythm.      Heart sounds:  Normal heart sounds.   Pulmonary:      Effort: Pulmonary effort is normal.      Breath sounds: Normal breath sounds.   Abdominal:      Palpations: Abdomen is soft.   Musculoskeletal:         General: Normal range of motion.      Cervical back: Normal range of motion and neck supple.   Skin:     General: Skin is dry.   Neurological:      Mental Status: She is alert and oriented to person, place, and time.   Psychiatric:         Behavior: Behavior normal.         Thought Content: Thought content normal.

## 2024-04-29 ENCOUNTER — APPOINTMENT (OUTPATIENT)
Dept: LAB | Facility: MEDICAL CENTER | Age: 34
End: 2024-04-29
Payer: COMMERCIAL

## 2024-04-29 DIAGNOSIS — Z00.00 ROUTINE ADULT HEALTH MAINTENANCE: ICD-10-CM

## 2024-04-29 LAB
ALBUMIN SERPL BCP-MCNC: 4.6 G/DL (ref 3.5–5)
ALP SERPL-CCNC: 56 U/L (ref 34–104)
ALT SERPL W P-5'-P-CCNC: 13 U/L (ref 7–52)
ANION GAP SERPL CALCULATED.3IONS-SCNC: 11 MMOL/L (ref 4–13)
AST SERPL W P-5'-P-CCNC: 16 U/L (ref 13–39)
BASOPHILS # BLD AUTO: 0.02 THOUSANDS/ÂΜL (ref 0–0.1)
BASOPHILS NFR BLD AUTO: 0 % (ref 0–1)
BILIRUB SERPL-MCNC: 0.54 MG/DL (ref 0.2–1)
BUN SERPL-MCNC: 14 MG/DL (ref 5–25)
CALCIUM SERPL-MCNC: 9.3 MG/DL (ref 8.4–10.2)
CHLORIDE SERPL-SCNC: 107 MMOL/L (ref 96–108)
CHOLEST SERPL-MCNC: 145 MG/DL
CO2 SERPL-SCNC: 23 MMOL/L (ref 21–32)
CREAT SERPL-MCNC: 0.72 MG/DL (ref 0.6–1.3)
EOSINOPHIL # BLD AUTO: 0.1 THOUSAND/ÂΜL (ref 0–0.61)
EOSINOPHIL NFR BLD AUTO: 2 % (ref 0–6)
ERYTHROCYTE [DISTWIDTH] IN BLOOD BY AUTOMATED COUNT: 11.9 % (ref 11.6–15.1)
GFR SERPL CREATININE-BSD FRML MDRD: 110 ML/MIN/1.73SQ M
GLUCOSE P FAST SERPL-MCNC: 95 MG/DL (ref 65–99)
HCT VFR BLD AUTO: 46 % (ref 34.8–46.1)
HDLC SERPL-MCNC: 39 MG/DL
HGB BLD-MCNC: 15.7 G/DL (ref 11.5–15.4)
IMM GRANULOCYTES # BLD AUTO: 0.03 THOUSAND/UL (ref 0–0.2)
IMM GRANULOCYTES NFR BLD AUTO: 1 % (ref 0–2)
LDLC SERPL CALC-MCNC: 70 MG/DL (ref 0–100)
LYMPHOCYTES # BLD AUTO: 1.65 THOUSANDS/ÂΜL (ref 0.6–4.47)
LYMPHOCYTES NFR BLD AUTO: 28 % (ref 14–44)
MCH RBC QN AUTO: 31.9 PG (ref 26.8–34.3)
MCHC RBC AUTO-ENTMCNC: 34.1 G/DL (ref 31.4–37.4)
MCV RBC AUTO: 94 FL (ref 82–98)
MONOCYTES # BLD AUTO: 0.42 THOUSAND/ÂΜL (ref 0.17–1.22)
MONOCYTES NFR BLD AUTO: 7 % (ref 4–12)
NEUTROPHILS # BLD AUTO: 3.79 THOUSANDS/ÂΜL (ref 1.85–7.62)
NEUTS SEG NFR BLD AUTO: 62 % (ref 43–75)
NONHDLC SERPL-MCNC: 106 MG/DL
NRBC BLD AUTO-RTO: 0 /100 WBCS
PLATELET # BLD AUTO: 161 THOUSANDS/UL (ref 149–390)
PMV BLD AUTO: 11.5 FL (ref 8.9–12.7)
POTASSIUM SERPL-SCNC: 4.2 MMOL/L (ref 3.5–5.3)
PROT SERPL-MCNC: 7.5 G/DL (ref 6.4–8.4)
RBC # BLD AUTO: 4.92 MILLION/UL (ref 3.81–5.12)
SODIUM SERPL-SCNC: 141 MMOL/L (ref 135–147)
TRIGL SERPL-MCNC: 182 MG/DL
TSH SERPL DL<=0.05 MIU/L-ACNC: 0.52 UIU/ML (ref 0.45–4.5)
WBC # BLD AUTO: 6.01 THOUSAND/UL (ref 4.31–10.16)

## 2024-04-29 PROCEDURE — 80061 LIPID PANEL: CPT

## 2024-04-29 PROCEDURE — 84443 ASSAY THYROID STIM HORMONE: CPT

## 2024-04-29 PROCEDURE — 36415 COLL VENOUS BLD VENIPUNCTURE: CPT

## 2024-04-29 PROCEDURE — 85025 COMPLETE CBC W/AUTO DIFF WBC: CPT

## 2024-04-29 PROCEDURE — 80053 COMPREHEN METABOLIC PANEL: CPT

## 2024-05-02 ENCOUNTER — OFFICE VISIT (OUTPATIENT)
Dept: FAMILY MEDICINE CLINIC | Facility: CLINIC | Age: 34
End: 2024-05-02
Payer: COMMERCIAL

## 2024-05-02 VITALS
HEART RATE: 88 BPM | OXYGEN SATURATION: 98 % | TEMPERATURE: 98.9 F | BODY MASS INDEX: 35.03 KG/M2 | DIASTOLIC BLOOD PRESSURE: 70 MMHG | SYSTOLIC BLOOD PRESSURE: 120 MMHG | HEIGHT: 66 IN | WEIGHT: 218 LBS | RESPIRATION RATE: 18 BRPM

## 2024-05-02 PROCEDURE — 99213 OFFICE O/P EST LOW 20 MIN: CPT | Performed by: NURSE PRACTITIONER

## 2024-05-02 RX ORDER — PHENTERMINE HYDROCHLORIDE 37.5 MG/1
37.5 TABLET ORAL DAILY
Qty: 30 TABLET | Refills: 0 | Status: SHIPPED | OUTPATIENT
Start: 2024-05-02

## 2024-05-23 ENCOUNTER — OFFICE VISIT (OUTPATIENT)
Dept: FAMILY MEDICINE CLINIC | Facility: CLINIC | Age: 34
End: 2024-05-23
Payer: COMMERCIAL

## 2024-05-23 VITALS
HEART RATE: 88 BPM | TEMPERATURE: 98.4 F | HEIGHT: 66 IN | DIASTOLIC BLOOD PRESSURE: 68 MMHG | BODY MASS INDEX: 34.07 KG/M2 | SYSTOLIC BLOOD PRESSURE: 112 MMHG | OXYGEN SATURATION: 97 % | WEIGHT: 212 LBS | RESPIRATION RATE: 18 BRPM

## 2024-05-23 PROCEDURE — 99213 OFFICE O/P EST LOW 20 MIN: CPT | Performed by: NURSE PRACTITIONER

## 2024-05-23 RX ORDER — PHENTERMINE HYDROCHLORIDE 37.5 MG/1
37.5 TABLET ORAL DAILY
Qty: 90 TABLET | Refills: 0 | Status: SHIPPED | OUTPATIENT
Start: 2024-05-23

## 2024-05-23 NOTE — PROGRESS NOTES
Assessment/Plan:      Diagnoses and all orders for this visit:    BMI 35.0-35.9,adult  -     phentermine (ADIPEX-P) 37.5 MG tablet; Take 1 tablet (37.5 mg total) by mouth daily        Dosing all possible side effects of the prescribed medications or medications that had been prescribed in the past were reviewed and all questions were answered.  Patient verbalized agreement and understanding of the plan of care as outlined during the office visit today return to office as indicated or sooner if a problem arises.    Subjective:     Patient ID: Jaclyn Cordero is a 33 y.o. female.    Pt here for fu to discuss her tolerance of the diet medication. She is doing very well. She has lost 6 lbs sensible. No ill SE.        Review of Systems   Constitutional:  Negative for appetite change and fever.   HENT:  Negative for sinus pressure and sore throat.    Eyes:  Negative for pain.   Respiratory:  Negative for shortness of breath.    Cardiovascular:  Negative for chest pain.   Gastrointestinal:  Negative for abdominal pain.   Genitourinary:  Negative for dysuria.   Musculoskeletal:  Negative for arthralgias and myalgias.   Skin:  Negative for color change.   Neurological:  Negative for light-headedness.   Psychiatric/Behavioral:  Negative for behavioral problems.          Objective:     Physical Exam  Cardiovascular:      Rate and Rhythm: Normal rate and regular rhythm.      Heart sounds: Normal heart sounds.   Pulmonary:      Effort: Pulmonary effort is normal.      Breath sounds: Normal breath sounds.   Neurological:      General: No focal deficit present.      Mental Status: She is alert and oriented to person, place, and time.

## 2024-05-30 ENCOUNTER — HOSPITAL ENCOUNTER (EMERGENCY)
Facility: HOSPITAL | Age: 34
Discharge: HOME | End: 2024-05-30
Attending: EMERGENCY MEDICINE
Payer: COMMERCIAL

## 2024-05-30 VITALS
DIASTOLIC BLOOD PRESSURE: 76 MMHG | OXYGEN SATURATION: 100 % | SYSTOLIC BLOOD PRESSURE: 138 MMHG | TEMPERATURE: 98.7 F | HEART RATE: 84 BPM | RESPIRATION RATE: 18 BRPM

## 2024-05-30 DIAGNOSIS — I10 HYPERTENSION, UNSPECIFIED TYPE: ICD-10-CM

## 2024-05-30 DIAGNOSIS — M62.838 TRAPEZIUS MUSCLE SPASM: Primary | ICD-10-CM

## 2024-05-30 PROCEDURE — 96372 THER/PROPH/DIAG INJ SC/IM: CPT

## 2024-05-30 PROCEDURE — 99282 EMERGENCY DEPT VISIT SF MDM: CPT

## 2024-05-30 PROCEDURE — 99284 EMERGENCY DEPT VISIT MOD MDM: CPT | Performed by: EMERGENCY MEDICINE

## 2024-05-30 RX ORDER — KETOROLAC TROMETHAMINE 30 MG/ML
30 INJECTION, SOLUTION INTRAMUSCULAR; INTRAVENOUS ONCE
Status: COMPLETED | OUTPATIENT
Start: 2024-05-30 | End: 2024-05-30

## 2024-05-30 RX ORDER — DICYCLOMINE HCL 20 MG
20 TABLET ORAL 2 TIMES DAILY PRN
Qty: 15 TABLET | Refills: 0 | Status: SHIPPED | OUTPATIENT
Start: 2024-05-30

## 2024-05-30 RX ADMIN — KETOROLAC TROMETHAMINE 30 MG: 30 INJECTION, SOLUTION INTRAMUSCULAR; INTRAVENOUS at 13:32

## 2024-05-30 NOTE — Clinical Note
Jaclyn Cordero was seen and treated in our emergency department on 5/30/2024.    No restrictions            Diagnosis:     Jaclyn  may return to work on return date.    She may return on this date: 06/03/2024         If you have any questions or concerns, please don't hesitate to call.      Garrick Head, DO    ______________________________           _______________          _______________  Hospital Representative                              Date                                Time

## 2024-06-03 NOTE — ED PROVIDER NOTES
History  Chief Complaint   Patient presents with    Neck Pain     R sided neck pain starting at work yesterday. Pain now radiating into the right face. No improvement with motrin, hot/cold, stretching. Muscle relaxer one hr pta         33-year-old female, history of intermittent neck pains, work yesterday says it was bothering her more but there was not a specific work-related injury that accompanied this.  Dull, radiates down to the top of her shoulder and to the right side of her face.,  Achy, worse with movement better with rest, no fevers no chills no abdominal pain no chest pain no falls no injuries no trauma      Neck Pain  Associated symptoms: no chest pain, no fever and no headaches        Prior to Admission Medications   Prescriptions Last Dose Informant Patient Reported? Taking?   FLUoxetine (PROzac) 40 MG capsule  Self Yes No   Patient not taking: Reported on 4/25/2024   acetaminophen (TYLENOL) 325 mg tablet  Self No No   Sig: Take 2 tablets (650 mg total) by mouth every 6 (six) hours as needed for mild pain or moderate pain for up to 3 doses   Patient not taking: Reported on 4/25/2024   cyclobenzaprine (FLEXERIL) 10 mg tablet  Self No No   Sig: Take 1 tablet (10 mg total) by mouth 3 (three) times a day as needed for muscle spasms   Patient not taking: Reported on 4/25/2024   lidocaine (Lidoderm) 5 %   No No   Sig: Apply 1 patch topically over 12 hours daily Remove & Discard patch within 12 hours or as directed by MD   medroxyPROGESTERone (DEPO-PROVERA) 150 mg/mL injection   No No   Sig: Inject 1 mL (150 mg total) into a muscle every 3 (three) months   Patient not taking: Reported on 4/25/2024   medroxyPROGESTERone acetate (DEPO-PROVERA SYRINGE) 150 mg/mL injection   Yes No   naproxen (Naprosyn) 500 mg tablet   No No   Sig: Take 1 tablet (500 mg total) by mouth 2 (two) times a day with meals   Patient not taking: Reported on 4/25/2024   phentermine (ADIPEX-P) 37.5 MG tablet   No No   Sig: Take 1 tablet  (37.5 mg total) by mouth daily      Facility-Administered Medications Last Administration Doses Remaining   medroxyPROGESTERone (DEPO-PROVERA) IM injection 150 mg 2024 11:15 AM           Past Medical History:   Diagnosis Date    37 weeks gestation of pregnancy 2022    Breech presentation 2022    Desires primary , declines EV    Depression     GBS (group B Streptococcus carrier), +RV culture, currently pregnant 2022    Gestational hypertension 2022    Gestational hypertension 2022    Kidney stone     Seasonal allergies     Tobacco smoking affecting pregnancy in third trimester 2022       Past Surgical History:   Procedure Laterality Date    IR IMAGE GUIDED ASPIRATION / DRAINAGE  3/22/2019    [place] BREAST REDUCTION Bilateral 3/12/2019    Procedure: REDUCTION MAMMOPLASTY BREAST;  Surgeon: Nik Hurd MD;  Location: [facility];  Service: Plastics    [place]  DELIVERY ONLY N/A 2022    Procedure:  SECTION ();  Surgeon: Krystle Cifuentes DO;  Location: [facility];  Service: Obstetrics    THROAT SURGERY      as a child to remove abcess; cyst removal    WISDOM TOOTH EXTRACTION      3/4       Family History   Problem Relation Age of Onset    Hypertension Mother     No Known Problems Father     Depression Sister     Polycystic ovary syndrome Sister     No Known Problems Daughter     Cervical cancer Maternal Grandmother     Diabetes Maternal Grandmother     Kidney disease Maternal Grandmother     Cancer Maternal Grandmother     Depression Maternal Grandfather     Breast cancer Neg Hx     Colon cancer Neg Hx     Ovarian cancer Neg Hx      I have reviewed and agree with the history as documented.    E-Cigarette/Vaping    E-Cigarette Use Never User      E-Cigarette/Vaping Substances    Nicotine No     THC No     CBD No     Flavoring No     Other No     Unknown No      Social History     Tobacco Use    Smoking status: Every Day     Current packs/day: 0.25      Average packs/day: 0.3 packs/day for 11.0 years (2.8 ttl pk-yrs)     Types: Cigarettes    Smokeless tobacco: Never    Tobacco comments:      smoked since age 13- per Ivanna   Vaping Use    Vaping status: Never Used   Substance Use Topics    Alcohol use: Not Currently     Comment: socially    Drug use: No       Review of Systems   Constitutional:  Negative for fever.   Respiratory:  Negative for chest tightness and shortness of breath.    Cardiovascular:  Negative for chest pain.   Musculoskeletal:  Positive for neck pain.   Skin:  Negative for rash.   Neurological:  Negative for dizziness, light-headedness and headaches.       Physical Exam  Physical Exam  Vitals reviewed.   Constitutional:       General: She is not in acute distress.     Appearance: She is well-developed.   HENT:      Head: Atraumatic.      Nose: Nose normal.   Eyes:      General: No scleral icterus.        Right eye: No discharge.         Left eye: No discharge.      Conjunctiva/sclera: Conjunctivae normal.   Neck:      Trachea: No tracheal deviation.   Pulmonary:      Effort: Pulmonary effort is normal. No respiratory distress.      Breath sounds: No stridor.   Musculoskeletal:         General: No deformity.      Cervical back: Normal range of motion.      Comments: Right-sided trapezius, palpation of this hypertonic muscle fully reproduces patient's pain.  No spinous process tenderness.  Full range of motion of the bilateral shoulders elbows, no weakness or numbness, 5-5 muscle strength radial median ulnar nerve function intact bilaterally.   Skin:     General: Skin is warm and dry.      Coloration: Skin is not pale.      Findings: No erythema or rash.   Neurological:      Mental Status: She is alert.      Motor: No abnormal muscle tone.      Coordination: Coordination normal.         Vital Signs  ED Triage Vitals [05/30/24 1224]   Temperature Pulse Respirations Blood Pressure SpO2   98.7 °F (37.1 °C) 85 18 (!) 178/106 100 %      Temp Source  Heart Rate Source Patient Position - Orthostatic VS BP Location FiO2 (%)   Oral Monitor Sitting Right arm --      Pain Score       7           Vitals:    05/30/24 1224 05/30/24 1330   BP: (!) 178/106 138/76   Pulse: 85 84   Patient Position - Orthostatic VS: Sitting          Visual Acuity      ED Medications  Medications   ketorolac (TORADOL) injection 30 mg (30 mg Intramuscular Given 5/30/24 1332)       Diagnostic Studies  Results Reviewed       None                   No orders to display              Procedures  Procedures         ED Course                                             Medical Decision Making        Initial ED assessment:   33-year-old female, right-sided neck pain, has been on and off problem, exacerbated at work yesterday but not a true work-related injury.    Initial DDx includes but is not limited to:   Sprain strain, muscle spasm, doubt fracture as there was no significant trauma, C-spine cleared by Nexus criteria    Initial ED plan:   Muscle relaxers, IM Toradol        Final ED summary/disposition:   After evaluation and workup in the emergency department, patient discharged CP for consideration of physical therapy discussed comprehensive spine program    Risk  Prescription drug management.             Disposition  Final diagnoses:   Trapezius muscle spasm   Hypertension, unspecified type     Time reflects when diagnosis was documented in both MDM as applicable and the Disposition within this note       Time User Action Codes Description Comment    5/30/2024  1:26 PM Garrick Head [M62.838] Trapezius muscle spasm     5/30/2024  1:26 PM Garrick Head Add [I10] Hypertension, unspecified type           ED Disposition       ED Disposition   Discharge    Condition   Stable    Date/Time   Thu May 30, 2024  1:26 PM    Comment   Jaclyn Cordero discharge to home/self care.                   Follow-up Information       Follow up With Specialties Details Why Contact Info    Funmi Vines,  [facility] Nurse Practitioner Call in 1 day to have your blood pressure rechecked [address] [place] [address]  [phone]              Discharge Medication List as of 5/30/2024  1:28 PM        START taking these medications    Details   dicyclomine (BENTYL) 20 mg tablet Take 1 tablet (20 mg total) by mouth 2 (two) times a day as needed (Abdominal pain/cramping), Starting u 5/30/2024, Normal           CONTINUE these medications which have NOT CHANGED    Details   acetaminophen (TYLENOL) 325 mg tablet Take 2 tablets (650 mg total) by mouth every 6 (six) hours as needed for mild pain or moderate pain for up to 3 doses, Starting Fri 5/20/2022, No Print      cyclobenzaprine (FLEXERIL) 10 mg tablet Take 1 tablet (10 mg total) by mouth 3 (three) times a day as needed for muscle spasms, Starting Thu 4/6/2023, Normal      FLUoxetine (PROzac) 40 MG capsule Starting Thu 3/30/2023, Historical Med      lidocaine (Lidoderm) 5 % Apply 1 patch topically over 12 hours daily Remove & Discard patch within 12 hours or as directed by MD, Starting Wed 5/17/2023, Until Fri 6/16/2023, Normal      !! medroxyPROGESTERone (DEPO-PROVERA) 150 mg/mL injection Inject 1 mL (150 mg total) into a muscle every 3 (three) months, Starting Thu 4/27/2023, Normal      !! medroxyPROGESTERone acetate (DEPO-PROVERA SYRINGE) 150 mg/mL injection Historical Med      naproxen (Naprosyn) 500 mg tablet Take 1 tablet (500 mg total) by mouth 2 (two) times a day with meals, Starting Wed 5/17/2023, Normal      phentermine (ADIPEX-P) 37.5 MG tablet Take 1 tablet (37.5 mg total) by mouth daily, Starting Thu 5/23/2024, Normal       !! - Potential duplicate medications found. Please discuss with provider.          No discharge procedures on file.    PDMP Review         Value Time User    PDMP Reviewed  Yes 5/20/2022  8:50 AM Brea Sanders MD            ED Provider  Electronically Signed by             Garrick Head DO  06/07/24 [id number]

## 2024-07-02 DIAGNOSIS — Z76.0 MEDICATION REFILL: Primary | ICD-10-CM

## 2024-07-02 RX ORDER — MEDROXYPROGESTERONE ACETATE 150 MG/ML
150 INJECTION, SUSPENSION INTRAMUSCULAR
Qty: 1 ML | Refills: 0 | Status: SHIPPED | OUTPATIENT
Start: 2024-07-02

## 2024-07-10 ENCOUNTER — ANNUAL EXAM (OUTPATIENT)
Dept: OBGYN CLINIC | Facility: CLINIC | Age: 34
End: 2024-07-10

## 2024-07-10 VITALS
DIASTOLIC BLOOD PRESSURE: 99 MMHG | RESPIRATION RATE: 18 BRPM | WEIGHT: 210 LBS | HEART RATE: 108 BPM | SYSTOLIC BLOOD PRESSURE: 150 MMHG | BODY MASS INDEX: 33.75 KG/M2 | HEIGHT: 66 IN

## 2024-07-10 DIAGNOSIS — R61 EXCESSIVE SWEATING: ICD-10-CM

## 2024-07-10 DIAGNOSIS — Z30.42 ENCOUNTER FOR SURVEILLANCE OF INJECTABLE CONTRACEPTIVE: ICD-10-CM

## 2024-07-10 DIAGNOSIS — Z76.0 MEDICATION REFILL: ICD-10-CM

## 2024-07-10 DIAGNOSIS — Z01.419 ENCOUNTER FOR GYNECOLOGICAL EXAMINATION WITHOUT ABNORMAL FINDING: Primary | ICD-10-CM

## 2024-07-10 PROCEDURE — 96372 THER/PROPH/DIAG INJ SC/IM: CPT | Performed by: NURSE PRACTITIONER

## 2024-07-10 PROCEDURE — 99395 PREV VISIT EST AGE 18-39: CPT | Performed by: NURSE PRACTITIONER

## 2024-07-10 RX ORDER — MEDROXYPROGESTERONE ACETATE 150 MG/ML
150 INJECTION, SUSPENSION INTRAMUSCULAR
Qty: 1 ML | Refills: 4 | Status: SHIPPED | OUTPATIENT
Start: 2024-07-10

## 2024-07-10 RX ADMIN — MEDROXYPROGESTERONE ACETATE 150 MG: 150 INJECTION, SUSPENSION INTRAMUSCULAR at 16:23

## 2024-07-10 NOTE — PROGRESS NOTES
Depo-Provera     [x]   Patient provided box yes   0 Refills remain  Refills submitted yes  Last  Annual Date / Birth control check : 7/10/2024   Last Depo date: 04/05/2024  Side effects: no  HCG: no  if applicable:   Given by: Jenny Dowling MA   Site: [facility]    Calcium supplement daily teaching  Condoms for 2 weeks following first injection dose.

## 2024-07-10 NOTE — PROGRESS NOTES
ASSESSMENT & PLAN: Jaclyn Cordero is a 33 y.o.  with normal gynecologic exam.    1.  Routine well woman exam done today  2.  Pap and HPV:  The patient's last pap and hpv was 2021.    It was normal.  Pap done 2019 was negative  Pap and cotesting was not done today.    Current ASCCP Guidelines reviewed.   3.  The following were reviewed in today's visit: breast self exam, STD testing, adequate intake of calcium and vitamin D, exercise, healthy diet, patient declined all STD testing, and Depo-Provera, and smoking cessation.  4.  She  has received Gardasil vaccines  5.  Depo-Provera for contraception, her last injection was 2024, dose given today, prescription renewed for the year  6.  Excessive sweating, is very bothersome to patient , will refer to dermatology    BMI Counseling: Body mass index is 33.89 kg/m². The BMI is above normal. Nutrition recommendations include encouraging healthy choices of fruits and vegetables and moderation in carbohydrate intake. Exercise recommendations include exercising 3-5 times per week. No pharmacotherapy was ordered. Rationale for BMI follow-up plan is due to patient being overweight or obese.     Depression Screening and Follow-up Plan: Patient was screened for depression during today's encounter. They screened negative with a PHQ-2 score of 0.    Tobacco Cessation Counseling: The patient is sincerely urged to quit consumption of tobacco. She is not ready to quit tobacco.         CC:  Annual Gynecologic Examination    HPI: Jaclyn Cordero is a 33 y.o.  who presents for annual gynecologic examination.    She has history of breast reduction 3/12/2019.  Uses Depo-Provera for contraception, she is due for dose today.  She has the following concerns: Excessive sweating.  Is bothersome to her and reports her skin gets red and irritated when it occurs.  Welcomes referral to dermatology    Health Maintenance:    She wears her seatbelt routinely.    She does  perform regular monthly self breast exams.    She feels safe at home.     Past Medical History:   Diagnosis Date    37 weeks gestation of pregnancy 2022    Breech presentation 2022    Desires primary , declines EV    Depression     GBS (group B Streptococcus carrier), +RV culture, currently pregnant 2022    Gestational hypertension 2022    Gestational hypertension 2022    Kidney stone     Seasonal allergies     Tobacco smoking affecting pregnancy in third trimester 2022       Past Surgical History:   Procedure Laterality Date    IR IMAGE GUIDED ASPIRATION / DRAINAGE  3/22/2019    [place] BREAST REDUCTION Bilateral 3/12/2019    Procedure: REDUCTION MAMMOPLASTY BREAST;  Surgeon: Nik Hurd MD;  [facility];  Service: Plastics    [place]  DELIVERY ONLY N/A 2022    Procedure:  SECTION ();  Surgeon: Krystle Cifuentes DO;  [facility];  Service: Obstetrics    THROAT SURGERY      as a child to remove abcess; cyst removal    WISDOM TOOTH EXTRACTION      3/4       Past OB/Gyn History:  OB History          2    Para   2    Term   2            AB        Living   2         SAB        IAB        Ectopic        Multiple   0    Live Births   2           Obstetric Comments   Bleeding at 11 wks during her pregnancy  Post dates             Pt does not have menstrual issues.  Amenorrheic with Depo  History of sexually transmitted infection: No.  History of abnormal pap smears: No .    Patient is currently sexually active.  heterosexual.  The current method of family planning is Depo-Provera injections.    Family History   Problem Relation Age of Onset    Hypertension Mother     No Known Problems Father     Depression Sister     Polycystic ovary syndrome Sister     No Known Problems Daughter     Cervical cancer Maternal Grandmother     Diabetes Maternal Grandmother     Kidney disease Maternal Grandmother     Cancer Maternal Grandmother      Depression Maternal Grandfather     Breast cancer Neg Hx     Colon cancer Neg Hx     Ovarian cancer Neg Hx        Social History:  Social History     Socioeconomic History    Marital status: Single     Spouse name: Not on file    Number of children: Not on file    Years of education: 12    Highest education level: Not on file   Occupational History    Occupation: [employer]   Tobacco Use    Smoking status: Every Day     Current packs/day: 0.25     Average packs/day: 0.3 packs/day for 11.0 years (2.8 ttl pk-yrs)     Types: Cigarettes    Smokeless tobacco: Never    Tobacco comments:      smoked since age 13- per [place]   Vaping Use    Vaping status: Never Used   Substance and Sexual Activity    Alcohol use: Not Currently     Comment: socially    Drug use: No    Sexual activity: Yes     Partners: Male     Birth control/protection: Injection   Other Topics Concern    Not on file   Social History Narrative    · Most recent tobacco use screenin2019        · Sexual orientation:   Heterosexual      · Exercise level:   Moderate      · Diet:   Regular      · General stress level:   High        · Alcohol intake:   Occasional      · Caffeine intake:   Moderate       · Guns present in home:   No      · Seat belts used routinely:   Yes      · Sunscreen used routinely:   No      · Smoke alarm in home:   Yes      · Advance directive:   No      Social Determinants of Health     Financial Resource Strain: Low Risk  (7/10/2024)    Overall Financial Resource Strain (CARDIA)     Difficulty of Paying Living Expenses: Not hard at all   Food Insecurity: No Food Insecurity (7/10/2024)    Hunger Vital Sign     Worried About Running Out of Food in the Last Year: Never true     Ran Out of Food in the Last Year: Never true   Transportation Needs: No Transportation Needs (7/10/2024)    PRAPARE - Transportation     Lack of Transportation (Medical): No     Lack of Transportation (Non-Medical): No   Physical Activity: Inactive (2021)     Exercise Vital Sign     Days of Exercise per Week: 0 days     Minutes of Exercise per Session: 0 min   Stress: No Stress Concern Present (7/8/2021)    [employer] - Occupational Stress Questionnaire     Feeling of Stress : Not at all   Social Connections: Moderately Isolated (7/8/2021)    Social Connection and Isolation Panel [NHANES]     Frequency of Communication with Friends and Family: More than three times a week     Frequency of Social Gatherings with Friends and Family: Once a week     Attends Latter-day Services: Never     Active Member of Clubs or Organizations: No     Attends Club or Organization Meetings: Never     Marital Status: Living with partner   Intimate Partner Violence: Not At Risk (7/8/2021)    Humiliation, Afraid, Rape, and Kick questionnaire     Fear of Current or Ex-Partner: No     Emotionally Abused: No     Physically Abused: No     Sexually Abused: No   Housing Stability: Low Risk  (7/10/2024)    Housing Stability Vital Sign     Unable to Pay for Housing in the Last Year: No     Number of Times Moved in the Last Year: 1     Homeless in the Last Year: No     Presently lives with family.  Patient is single.  Patient is currently employed     Allergies   Allergen Reactions    Medical Tape Rash and Blisters         Current Outpatient Medications:     dicyclomine (BENTYL) 20 mg tablet, Take 1 tablet (20 mg total) by mouth 2 (two) times a day as needed (Abdominal pain/cramping), Disp: 15 tablet, Rfl: 0    medroxyPROGESTERone acetate (DEPO-PROVERA SYRINGE) 150 mg/mL injection, Inject 1 mL (150 mg total) into a muscle every 3 (three) months, Disp: 1 mL, Rfl: 0    phentermine (ADIPEX-P) 37.5 MG tablet, Take 1 tablet (37.5 mg total) by mouth daily, Disp: 90 tablet, Rfl: 0    acetaminophen (TYLENOL) 325 mg tablet, Take 2 tablets (650 mg total) by mouth every 6 (six) hours as needed for mild pain or moderate pain for up to 3 doses (Patient not taking: Reported on  4/25/2024), Disp: 6 tablet, Rfl: 0    cyclobenzaprine (FLEXERIL) 10 mg tablet, Take 1 tablet (10 mg total) by mouth 3 (three) times a day as needed for muscle spasms (Patient not taking: Reported on 4/25/2024), Disp: 30 tablet, Rfl: 0    FLUoxetine (PROzac) 40 MG capsule, , Disp: , Rfl:     lidocaine (Lidoderm) 5 %, Apply 1 patch topically over 12 hours daily Remove & Discard patch within 12 hours or as directed by MD, Disp: 30 patch, Rfl: 0    medroxyPROGESTERone (DEPO-PROVERA) 150 mg/mL injection, Inject 1 mL (150 mg total) into a muscle every 3 (three) months (Patient not taking: Reported on 4/25/2024), Disp: 1 mL, Rfl: 4    naproxen (Naprosyn) 500 mg tablet, Take 1 tablet (500 mg total) by mouth 2 (two) times a day with meals (Patient not taking: Reported on 4/25/2024), Disp: 30 tablet, Rfl: 0    Current Facility-Administered Medications:     medroxyPROGESTERone (DEPO-PROVERA) IM injection 150 mg, 150 mg, Intramuscular, Q3 Months, Ellen Beltran MD, 150 mg at 07/10/24 1623      Review of Systems  Constitutional :no fever, feels well, no tiredness, no recent weight gain or loss  ENT: no ear ache, no loss of hearing, no nosebleeds or nasal discharge, no sore throat or hoarseness.  Cardiovascular: no complaints of slow or fast heart beat, no chest pain, no palpitations, no leg claudication or lower extremity edema.  Respiratory: no complaints of shortness of shortness of breath, no MARTINEZ  Breasts:no complaints of breast pain, breast lump, or nipple discharge  Gastrointestinal: no complaints of abdominal pain, constipation, nausea, vomiting, or diarrhea or bloody stools  Genitourinary : no complaints of dysuria, incontinence, pelvic pain, no dysmenorrhea, vaginal discharge or abnormal vaginal bleeding and as noted in HPI.  Musculoskeletal: no complaints of arthralgia, no myalgia, no joint swelling or stiffness, no limb pain or swelling.  Integumentary: no complaints of skin rash or lesion, itching or dry  "skin  Neurological: no complaints of headache, no confusion, no numbness or tingling, no dizziness or fainting    Objective      /99 (BP Location: Left arm, Patient Position: Sitting, Cuff Size: Adult) Comment: had red bull drink and stressed today  Pulse (!) 108   Resp 18   Ht 5' 6\" (1.676 m)   Wt 95.3 kg (210 lb)   LMP  (LMP Unknown)   BMI 33.89 kg/m²   General:   appears stated age, cooperative, alert normal mood and affect   Neck: normal, supple,trachea midline, no masses   Heart: regular rate and rhythm, S1, S2 normal, no murmur, click, rub or gallop   Lungs: clear to auscultation bilaterally   Breasts: No nipple retraction or dimpling, No nipple discharge or bleeding, No axillary or supraclavicular adenopathy, Normal to palpation without dominant masses, Taught monthly breast self examination, scars from bilateral breast reduction   Abdomen: soft, non-tender, without masses or organomegaly   Vulva: normal female genitalia, some scars from pimples, she will also get on her buttocks   Vagina: normal vagina, no discharge, exudate, lesion, or erythema   Urethra: normal   Cervix: Normal, no discharge. Nontender.   Uterus: normal size, contour, position, consistency, mobility, non-tender and anteverted   Adnexa: normal adnexa and no mass, fullness, tenderness   Lymphatic palpation of lymph nodes in neck, axilla, groin and/or other locations: no lymphadenopathy or masses noted   Skin normal skin turgor and no rashes.   Psychiatric orientation to person, place, and time: normal. mood and affect: normal     "

## 2024-08-09 PROBLEM — Z01.419 ENCOUNTER FOR GYNECOLOGICAL EXAMINATION WITHOUT ABNORMAL FINDING: Status: RESOLVED | Noted: 2024-07-10 | Resolved: 2024-08-09

## 2024-08-22 ENCOUNTER — OFFICE VISIT (OUTPATIENT)
Dept: FAMILY MEDICINE CLINIC | Facility: CLINIC | Age: 34
End: 2024-08-22
Payer: COMMERCIAL

## 2024-08-22 VITALS
RESPIRATION RATE: 16 BRPM | DIASTOLIC BLOOD PRESSURE: 88 MMHG | BODY MASS INDEX: 32.47 KG/M2 | OXYGEN SATURATION: 99 % | HEIGHT: 66 IN | SYSTOLIC BLOOD PRESSURE: 142 MMHG | TEMPERATURE: 98.4 F | WEIGHT: 202 LBS | HEART RATE: 104 BPM

## 2024-08-22 DIAGNOSIS — F41.9 ANXIETY: Primary | ICD-10-CM

## 2024-08-22 PROCEDURE — 99213 OFFICE O/P EST LOW 20 MIN: CPT | Performed by: NURSE PRACTITIONER

## 2024-08-22 NOTE — PROGRESS NOTES
Assessment/Plan:      Diagnoses and all orders for this visit:    Anxiety    BMI 33.0-33.9,adult  -     Semaglutide-Weight Management (WEGOVY) 0.25 MG/0.5ML; Inject 0.5 mL (0.25 mg total) under the skin once a week        Physical assessment unremarkable.  Patient had been on the phentermine for 3 months did advise me that she feels anxious on the medication did advise her that this is a disqualification for this medication as this poorly tolerated.  Will attempt to prescribe Wegovy with prior Auth most likely patient will return to office to discuss tolerance of that medication and weight in about 3 weeks.  Dosing all possible side effects of the prescribed medications or medications that had been prescribed in the past were reviewed and all questions were answered.  Patient verbalized agreement and understanding of the plan of care as outlined during the office visit today return to office as indicated or sooner if a problem arises.    Subjective:     Patient ID: Jaclyn Cordero is a 33 y.o. female.    3 month follow up on weight loss medication phentermine which she says has made her feel slightly anxious and possibly depressed.  She denies any thoughts of harming herself or harming anyone else.        Review of Systems   Psychiatric/Behavioral:  The patient is nervous/anxious.          Objective:     Physical Exam  Cardiovascular:      Rate and Rhythm: Normal rate and regular rhythm.      Heart sounds: Normal heart sounds.   Pulmonary:      Effort: Pulmonary effort is normal.      Breath sounds: Normal breath sounds.   Neurological:      General: No focal deficit present.      Mental Status: She is oriented to person, place, and time.

## 2024-08-29 ENCOUNTER — TELEPHONE (OUTPATIENT)
Age: 34
End: 2024-08-29

## 2024-08-29 NOTE — TELEPHONE ENCOUNTER
Patient called in stating she was not notified if her weight loss medication was ever sent to the pharmacy for her to . She also had not received a phone call from the pharmacy stating med was ready for . I let her know she should contact the pharmacy as this was sent over on the 22nd of August. She is going to call and find out if med needs prior auth or if she is good to move forward with the appointment. If she requires the prior auth, please reschedule her appointment and send message to Med prior auth team. Thank you!

## 2024-10-07 ENCOUNTER — CLINICAL SUPPORT (OUTPATIENT)
Dept: OBGYN CLINIC | Facility: CLINIC | Age: 34
End: 2024-10-07

## 2024-10-07 DIAGNOSIS — Z30.42 ENCOUNTER FOR MANAGEMENT AND INJECTION OF DEPO-PROVERA: Primary | ICD-10-CM

## 2024-10-07 PROCEDURE — 96372 THER/PROPH/DIAG INJ SC/IM: CPT

## 2024-10-07 RX ADMIN — MEDROXYPROGESTERONE ACETATE 150 MG: 150 INJECTION, SUSPENSION INTRAMUSCULAR at 16:04

## 2024-10-07 NOTE — PROGRESS NOTES
Depo-Provera     [x]   Patient provided box YES   4 Refills remain  Refills submitted no  Last  Annual Date / Birth control check : 07/10/2024  Last Depo date: 07/10/2024  Side effects: no  HCG: no  if applicable:   Given by: Salina Yu MA    Site: RUOQ    Calcium supplement daily teaching  Condoms for 2 weeks following first injection dose.

## 2024-12-23 ENCOUNTER — CLINICAL SUPPORT (OUTPATIENT)
Dept: OBGYN CLINIC | Facility: CLINIC | Age: 34
End: 2024-12-23

## 2024-12-23 DIAGNOSIS — Z30.42 ENCOUNTER FOR MANAGEMENT AND INJECTION OF DEPO-PROVERA: Primary | ICD-10-CM

## 2024-12-23 PROCEDURE — 96372 THER/PROPH/DIAG INJ SC/IM: CPT

## 2024-12-23 RX ADMIN — MEDROXYPROGESTERONE ACETATE 150 MG: 150 INJECTION, SUSPENSION INTRAMUSCULAR at 13:43

## 2024-12-23 NOTE — PROGRESS NOTES
Depo-Provera     [x]   Patient provided box YES   3 Refills remain  Refills submitted no  Last  Annual Date / Birth control check : 07/10/2024    Last Depo date: 10/07/2024  Side effects: no  HCG: no  if applicable:   Given by: Salina Yu MA   Site: RUOQ    Calcium supplement daily teaching  Condoms for 2 weeks following first injection dose.

## 2025-03-10 ENCOUNTER — CLINICAL SUPPORT (OUTPATIENT)
Dept: OBGYN CLINIC | Facility: CLINIC | Age: 35
End: 2025-03-10

## 2025-03-10 DIAGNOSIS — Z30.42 ENCOUNTER FOR MANAGEMENT AND INJECTION OF DEPO-PROVERA: Primary | ICD-10-CM

## 2025-03-10 PROCEDURE — 96372 THER/PROPH/DIAG INJ SC/IM: CPT

## 2025-03-10 RX ADMIN — MEDROXYPROGESTERONE ACETATE 150 MG: 150 INJECTION, SUSPENSION INTRAMUSCULAR at 15:55

## 2025-03-10 NOTE — PROGRESS NOTES
Depo-Provera     [x]   Patient provided box YES   2 Refills remain  Refills submitted no  Last  Annual Date / Birth control check : 07/10/2024  Last Depo date: 12/23/2024  Side effects: no  HCG: no  if applicable:   Given by: Salina Yu MA   Site: LUOQ    Calcium supplement daily teaching  Condoms for 2 weeks following first injection dose.

## 2025-04-22 ENCOUNTER — OFFICE VISIT (OUTPATIENT)
Dept: URGENT CARE | Facility: MEDICAL CENTER | Age: 35
End: 2025-04-22
Payer: COMMERCIAL

## 2025-04-22 ENCOUNTER — APPOINTMENT (OUTPATIENT)
Dept: RADIOLOGY | Facility: MEDICAL CENTER | Age: 35
End: 2025-04-22
Attending: PHYSICIAN ASSISTANT
Payer: COMMERCIAL

## 2025-04-22 VITALS
WEIGHT: 216 LBS | DIASTOLIC BLOOD PRESSURE: 94 MMHG | BODY MASS INDEX: 34.86 KG/M2 | OXYGEN SATURATION: 99 % | RESPIRATION RATE: 18 BRPM | HEART RATE: 74 BPM | TEMPERATURE: 97.9 F | SYSTOLIC BLOOD PRESSURE: 140 MMHG

## 2025-04-22 DIAGNOSIS — M25.552 PAIN OF LEFT HIP: Primary | ICD-10-CM

## 2025-04-22 DIAGNOSIS — M25.552 PAIN OF LEFT HIP: ICD-10-CM

## 2025-04-22 PROCEDURE — 72100 X-RAY EXAM L-S SPINE 2/3 VWS: CPT

## 2025-04-22 PROCEDURE — 99214 OFFICE O/P EST MOD 30 MIN: CPT | Performed by: PHYSICIAN ASSISTANT

## 2025-04-22 PROCEDURE — 73502 X-RAY EXAM HIP UNI 2-3 VIEWS: CPT

## 2025-04-22 RX ORDER — PREDNISONE 20 MG/1
40 TABLET ORAL DAILY
Qty: 10 TABLET | Refills: 0 | Status: SHIPPED | OUTPATIENT
Start: 2025-04-22 | End: 2025-04-27

## 2025-04-22 NOTE — LETTER
April 22, 2025     Patient: Jaclyn Cordero   YOB: 1990   Date of Visit: 4/22/2025       To Whom it May Concern:    Jaclyn Cordero was seen in my clinic on 4/22/2025. She may return to work on 4/26/2025 .    If you have any questions or concerns, please don't hesitate to call.         Sincerely,          Osmin Atkins PA-C        CC: No Recipients

## 2025-04-22 NOTE — PROGRESS NOTES
[facility]        NAME: Jaclyn Cordero is a 34 y.o. female  : 1990    MRN: [id number]  DATE: 2025  TIME: 12:52 PM    Assessment and Plan   Pain of left hip [M25.552]  1. Pain of left hip  XR hip/pelv 2-3 vws left if performed    XR spine lumbar 2 or 3 views injury            Patient Instructions     Sprain left hip  Prednisone 40 mg daily x 5 days  Follow up with PCP in 3-5 days.  Proceed to  ER if symptoms worsen.    Chief Complaint     Chief Complaint   Patient presents with    Groin Pain     Pt. With pain in her left hip/groin for about a week. Does not remember a specific injury. Flexeril gave her some relief.          History of Present Illness       33 y/o female who presents c/o pain to left hip. Patient states she developed the pain approximately one week ago and has progressively worse. Denies fever, chills, trauma    Groin Pain        Review of Systems   Review of Systems   Constitutional: Negative.    HENT: Negative.     Eyes: Negative.    Respiratory: Negative.  Negative for cough, chest tightness, shortness of breath, wheezing and stridor.    Cardiovascular: Negative.  Negative for chest pain, palpitations and leg swelling.   Musculoskeletal:  Positive for arthralgias.         Current Medications       Current Outpatient Medications:     cyclobenzaprine (FLEXERIL) 10 mg tablet, Take 1 tablet (10 mg total) by mouth 3 (three) times a day as needed for muscle spasms, Disp: 30 tablet, Rfl: 0    medroxyPROGESTERone acetate (DEPO-PROVERA SYRINGE) 150 mg/mL injection, Inject 1 mL (150 mg total) into a muscle every 3 (three) months, Disp: 1 mL, Rfl: 4    acetaminophen (TYLENOL) 325 mg tablet, Take 2 tablets (650 mg total) by mouth every 6 (six) hours as needed for mild pain or moderate pain for up to 3 doses (Patient not taking: Reported on 2024), Disp: 6 tablet, Rfl: 0    dicyclomine (BENTYL) 20 mg tablet, Take 1 tablet (20 mg total) by mouth 2 (two) times a day as needed  (Abdominal pain/cramping) (Patient not taking: Reported on 2024), Disp: 15 tablet, Rfl: 0    FLUoxetine (PROzac) 40 MG capsule, , Disp: , Rfl:     medroxyPROGESTERone (DEPO-PROVERA) 150 mg/mL injection, Inject 1 mL (150 mg total) into a muscle every 3 (three) months (Patient not taking: Reported on 2024), Disp: 1 mL, Rfl: 4    naproxen (Naprosyn) 500 mg tablet, Take 1 tablet (500 mg total) by mouth 2 (two) times a day with meals (Patient not taking: Reported on 2024), Disp: 30 tablet, Rfl: 0    Current Facility-Administered Medications:     medroxyPROGESTERone (DEPO-PROVERA) IM injection 150 mg, 150 mg, Intramuscular, Q3 Months, Ellen Beltran MD, 150 mg at 03/10/25 1555    Current Allergies     Allergies as of 2025 - Reviewed 2025   Allergen Reaction Noted    Medical tape Rash and Blisters 10/31/2013            The following portions of the patient's history were reviewed and updated as appropriate: allergies, current medications, past family history, past medical history, past social history, past surgical history and problem list.     Past Medical History:   Diagnosis Date    37 weeks gestation of pregnancy 2022    Breech presentation 2022    Desires primary , declines EV    Depression     GBS (group B Streptococcus carrier), +RV culture, currently pregnant 2022    Gestational hypertension 2022    Gestational hypertension 2022    Kidney stone     Seasonal allergies     Tobacco smoking affecting pregnancy in third trimester 2022       Past Surgical History:   Procedure Laterality Date    IR IMAGE GUIDED ASPIRATION / DRAINAGE  3/22/2019    [place] BREAST REDUCTION Bilateral 3/12/2019    Procedure: REDUCTION MAMMOPLASTY BREAST;  Surgeon: Nik Hurd MD;  [facility];  Service: Plastics    [place]  DELIVERY ONLY N/A 2022    Procedure:  SECTION ();  Surgeon: Krystle Cifuentes DO;  [facility];  Service:  Obstetrics    THROAT SURGERY  1992    as a child to remove abcess; cyst removal    WISDOM TOOTH EXTRACTION      3/4       Family History   Problem Relation Age of Onset    Hypertension Mother     No Known Problems Father     Depression Sister     Polycystic ovary syndrome Sister     No Known Problems Daughter     Cervical cancer Maternal Grandmother     Diabetes Maternal Grandmother     Kidney disease Maternal Grandmother     Cancer Maternal Grandmother     Depression Maternal Grandfather     Breast cancer Neg Hx     Colon cancer Neg Hx     Ovarian cancer Neg Hx          Medications have been verified.        Objective   /94   Pulse 74   Temp 97.9 °F (36.6 °C)   Resp 18   Wt 98 kg (216 lb)   SpO2 99%   BMI 34.86 kg/m²        Physical Exam     Physical Exam  Constitutional:       Appearance: Normal appearance. She is well-developed.   HENT:      Head: Normocephalic and atraumatic.      Right Ear: External ear normal.      Left Ear: External ear normal.      Nose: Nose normal.      Mouth/Throat:      Pharynx: No oropharyngeal exudate.   Cardiovascular:      Rate and Rhythm: Normal rate and regular rhythm.      Heart sounds: Normal heart sounds.   Pulmonary:      Effort: Pulmonary effort is normal. No respiratory distress.      Breath sounds: Normal breath sounds. No wheezing or rales.   Chest:      Chest wall: No tenderness.   Musculoskeletal:      Cervical back: Normal, normal range of motion and neck supple.      Thoracic back: Normal.        Back:       Right hip: Normal.      Left hip: Tenderness present. No deformity, lacerations, bony tenderness or crepitus. Decreased range of motion. Normal strength.        Legs:    Lymphadenopathy:      Cervical: No cervical adenopathy.   Neurological:      Mental Status: She is alert.

## 2025-04-22 NOTE — PATIENT INSTRUCTIONS
Sprain left hip  Prednisone 40 mg daily x 5 days  Follow up with PCP in 3-5 days.  Proceed to  ER if symptoms worsen.

## 2025-06-11 ENCOUNTER — CLINICAL SUPPORT (OUTPATIENT)
Dept: OBGYN CLINIC | Facility: CLINIC | Age: 35
End: 2025-06-11

## 2025-06-11 DIAGNOSIS — Z30.42 ENCOUNTER FOR MANAGEMENT AND INJECTION OF DEPO-PROVERA: Primary | ICD-10-CM

## 2025-06-11 PROCEDURE — 96372 THER/PROPH/DIAG INJ SC/IM: CPT

## 2025-06-11 RX ADMIN — MEDROXYPROGESTERONE ACETATE 150 MG: 150 INJECTION, SUSPENSION INTRAMUSCULAR at 16:13

## 2025-06-11 NOTE — PROGRESS NOTES
Depo-Provera     [x]   Patient provided box yes   1 Refills remain  Refills submitted no  Last  Annual Date / Birth control check : 07/10/2024  Last Depo date: 03/10/2025  Side effects: no  HCG: yes  if applicable: negative  Given by: ev  Site: left deltoid    Calcium supplement daily teaching  Condoms for 2 weeks following first injection dose.

## (undated) DEVICE — GAUZE SPONGES,16 PLY: Brand: CURITY

## (undated) DEVICE — TELFA NON-ADHERENT ABSORBENT DRESSING: Brand: TELFA

## (undated) DEVICE — CHLORAPREP HI-LITE 26ML ORANGE

## (undated) DEVICE — GLOVE SRG BIOGEL ECLIPSE 7.5

## (undated) DEVICE — SKIN MARKER DUAL TIP WITH RULER CAP, FLEXIBLE RULER AND LABELS: Brand: DEVON

## (undated) DEVICE — INTENDED FOR TISSUE SEPARATION, AND OTHER PROCEDURES THAT REQUIRE A SHARP SURGICAL BLADE TO PUNCTURE OR CUT.: Brand: BARD-PARKER ® CARBON RIB-BACK BLADES

## (undated) DEVICE — DRAPE EQUIPMENT RF WAND

## (undated) DEVICE — ADHESIVE SKN CLSR HISTOACRYL FLEX 0.5ML LF

## (undated) DEVICE — SUT VICRYL 0 CT-1 27 IN J260H

## (undated) DEVICE — GLOVE INDICATOR PI UNDERGLOVE SZ 8 BLUE

## (undated) DEVICE — DISSECTOR ACE BLADE 700 MEGADYNE 25IN STD

## (undated) DEVICE — DERMABOND PRINEO

## (undated) DEVICE — MEDI-VAC YANKAUER SUCTION HANDLE W/STRAIGHT TIP & CONTROL VENT: Brand: CARDINAL HEALTH

## (undated) DEVICE — ABDOMINAL PAD: Brand: DERMACEA

## (undated) DEVICE — VIAL DECANTER

## (undated) DEVICE — PACK C-SECTION PBDS

## (undated) DEVICE — SUT VICRYL 4-0 KS 27 IN J662H

## (undated) DEVICE — BETHLEHEM UNIVERSAL MINOR GEN: Brand: CARDINAL HEALTH

## (undated) DEVICE — GLOVE SRG BIOGEL ECLIPSE 7

## (undated) DEVICE — PROXIMATE SKIN STAPLERS (35 WIDE) CONTAINS 35 STAINLESS STEEL STAPLES (FIXED HEAD): Brand: PROXIMATE

## (undated) DEVICE — SPONGE LAP 18 X 18 IN STRL RFD

## (undated) DEVICE — SUT STRATAFIX SPIRAL MONOCRYL PLUS 3-0 PS-2 45CM SXMP1B107

## (undated) DEVICE — SUT MONOCRYL 0 CTX 36 IN Y398H

## (undated) DEVICE — TUBING SUCTION 5MM X 12 FT

## (undated) DEVICE — GLOVE INDICATOR PI UNDERGLOVE SZ 7.5 BLUE

## (undated) DEVICE — Device

## (undated) DEVICE — LIGHT HANDLE COVER SLEEVE DISP BLUE STELLAR

## (undated) DEVICE — ELECTRODE BLADE MOD E-Z CLEAN  2.75IN 7CM -0012AM

## (undated) DEVICE — STAPLER INSORB SUBCUTICULAR 30 SINGLE USE

## (undated) DEVICE — GLOVE SRG BIOGEL ECLIPSE 8

## (undated) DEVICE — PENCIL ELECTROSURG E-Z CLEAN -0035H

## (undated) DEVICE — TRAY FOLEY 16FR URIMETER SURESTEP

## (undated) DEVICE — ADHESIVE SKIN HIGH VISCOSITY EXOFIN 1ML

## (undated) DEVICE — SUT MONOCRYL 4-0 PS-2 27 IN Y426H

## (undated) DEVICE — ADHESIVE SKIN CLOSR DERMABOND PRINEO

## (undated) DEVICE — PAD GROUNDING ADULT

## (undated) DEVICE — SYRINGE 20ML LL

## (undated) DEVICE — DRAPE SHEET THREE QUARTER

## (undated) DEVICE — PACK UNIVERSAL DRAPES SUB-Q ICD

## (undated) DEVICE — NEEDLE 25G X 1 1/2